# Patient Record
Sex: FEMALE | Race: WHITE | NOT HISPANIC OR LATINO | Employment: OTHER | URBAN - METROPOLITAN AREA
[De-identification: names, ages, dates, MRNs, and addresses within clinical notes are randomized per-mention and may not be internally consistent; named-entity substitution may affect disease eponyms.]

---

## 2017-03-15 ENCOUNTER — OFFICE VISIT (OUTPATIENT)
Dept: FAMILY MEDICINE | Facility: CLINIC | Age: 62
End: 2017-03-15
Payer: COMMERCIAL

## 2017-03-15 VITALS
OXYGEN SATURATION: 98 % | SYSTOLIC BLOOD PRESSURE: 124 MMHG | BODY MASS INDEX: 27.28 KG/M2 | TEMPERATURE: 98 F | WEIGHT: 184.19 LBS | DIASTOLIC BLOOD PRESSURE: 81 MMHG | HEIGHT: 69 IN | HEART RATE: 84 BPM

## 2017-03-15 DIAGNOSIS — E03.9 HYPOTHYROIDISM, UNSPECIFIED TYPE: ICD-10-CM

## 2017-03-15 DIAGNOSIS — Z23 NEED FOR DIPHTHERIA-TETANUS-PERTUSSIS (TDAP) VACCINE, ADULT/ADOLESCENT: Primary | ICD-10-CM

## 2017-03-15 DIAGNOSIS — M54.50 CHRONIC LOW BACK PAIN WITHOUT SCIATICA, UNSPECIFIED BACK PAIN LATERALITY: Chronic | ICD-10-CM

## 2017-03-15 DIAGNOSIS — G43.109 OPHTHALMIC MIGRAINE: ICD-10-CM

## 2017-03-15 DIAGNOSIS — G89.29 CHRONIC LOW BACK PAIN WITHOUT SCIATICA, UNSPECIFIED BACK PAIN LATERALITY: Chronic | ICD-10-CM

## 2017-03-15 DIAGNOSIS — D12.6 TUBULAR ADENOMA OF COLON: ICD-10-CM

## 2017-03-15 DIAGNOSIS — I10 UNSPECIFIED ESSENTIAL HYPERTENSION: ICD-10-CM

## 2017-03-15 DIAGNOSIS — Z23 NEED FOR SHINGLES VACCINE: ICD-10-CM

## 2017-03-15 PROCEDURE — 90736 HZV VACCINE LIVE SUBQ: CPT | Mod: S$GLB,,, | Performed by: INTERNAL MEDICINE

## 2017-03-15 PROCEDURE — 99214 OFFICE O/P EST MOD 30 MIN: CPT | Mod: 25,S$GLB,, | Performed by: INTERNAL MEDICINE

## 2017-03-15 PROCEDURE — 90715 TDAP VACCINE 7 YRS/> IM: CPT | Mod: S$GLB,,, | Performed by: INTERNAL MEDICINE

## 2017-03-15 PROCEDURE — 3074F SYST BP LT 130 MM HG: CPT | Mod: S$GLB,,, | Performed by: INTERNAL MEDICINE

## 2017-03-15 PROCEDURE — 3079F DIAST BP 80-89 MM HG: CPT | Mod: S$GLB,,, | Performed by: INTERNAL MEDICINE

## 2017-03-15 PROCEDURE — 1160F RVW MEDS BY RX/DR IN RCRD: CPT | Mod: S$GLB,,, | Performed by: INTERNAL MEDICINE

## 2017-03-15 PROCEDURE — 90471 IMMUNIZATION ADMIN: CPT | Mod: S$GLB,,, | Performed by: INTERNAL MEDICINE

## 2017-03-15 PROCEDURE — 90472 IMMUNIZATION ADMIN EACH ADD: CPT | Mod: S$GLB,,, | Performed by: INTERNAL MEDICINE

## 2017-03-15 PROCEDURE — 99999 PR PBB SHADOW E&M-EST. PATIENT-LVL III: CPT | Mod: PBBFAC,,, | Performed by: INTERNAL MEDICINE

## 2017-03-15 RX ORDER — NADOLOL 20 MG/1
20 TABLET ORAL DAILY
Qty: 30 TABLET | Refills: 11 | Status: SHIPPED | OUTPATIENT
Start: 2017-03-15 | End: 2017-10-12

## 2017-03-15 RX ORDER — LEVOTHYROXINE SODIUM 75 UG/1
75 TABLET ORAL
Qty: 90 TABLET | Refills: 3 | Status: SHIPPED | OUTPATIENT
Start: 2017-03-15 | End: 2018-03-02 | Stop reason: SDUPTHER

## 2017-03-15 RX ORDER — GABAPENTIN 300 MG/1
300 CAPSULE ORAL 3 TIMES DAILY
Qty: 90 CAPSULE | Refills: 11 | Status: SHIPPED | OUTPATIENT
Start: 2017-03-15 | End: 2017-10-12

## 2017-03-15 RX ORDER — LOSARTAN POTASSIUM 100 MG/1
100 TABLET ORAL DAILY
Qty: 90 TABLET | Refills: 3 | Status: SHIPPED | OUTPATIENT
Start: 2017-03-15 | End: 2017-10-12

## 2017-03-15 NOTE — PROGRESS NOTES
Assessment & Plan  Need for diphtheria-tetanus-pertussis (Tdap) vaccine, adult/adolescent  -     Tdap Vaccine    Chronic low back pain without sciatica, unspecified back pain laterality-discussed gabapentin, she can increase it to 1 tablet a morning, 2 tablets at night.  -     Ambulatory Referral to Physical/Occupational Therapy  -     gabapentin (NEURONTIN) 300 MG capsule; Take 1 capsule (300 mg total) by mouth 3 (three) times daily.  Dispense: 90 capsule; Refill: 11    Ophthalmic migraine-refilled nadolol.  Rare use.  -     nadolol (CORGARD) 20 MG tablet; Take 1 tablet (20 mg total) by mouth once daily.  Dispense: 30 tablet; Refill: 11    Tubular adenoma of colon-due for repeat January 2018    Hypothyroidism, unspecified type-stable on last check, refilled today  -     levothyroxine (SYNTHROID) 75 MCG tablet; Take 1 tablet (75 mcg total) by mouth before breakfast.  Dispense: 90 tablet; Refill: 3    Unspecified essential hypertension-stable, refilled losartan.  -     losartan (COZAAR) 100 MG tablet; Take 1 tablet (100 mg total) by mouth once daily.  Dispense: 90 tablet; Refill: 3    Need for shingles vaccine  -     Zoster Vaccine - Live    Ten-year ASCVD risk score calculated.  Less than 5%.  No indication for statin at this time.      Medications Discontinued During This Encounter   Medication Reason    levothyroxine (SYNTHROID) 75 MCG tablet Reorder    losartan (COZAAR) 100 MG tablet Reorder    nadolol (CORGARD) 20 MG tablet Reorder    gabapentin (NEURONTIN) 300 MG capsule Reorder       Follow-up: No Follow-up on file.PEx 1 year      =================================================================      Chief Complaint   Patient presents with    Immunizations       HPI  Radha is a 61 y.o. female, last appointment with this clinic was Visit date not found.    No LMP recorded. Patient is postmenopausal.    HTN-losartan, nadolol to take as needed for blood pressure but also mainly for migraines.  Does not check  BPs outside office.  chronic back pain; 8/10/2016 MRI L spine: 1. Significant degenerative disc disease at L5-S1 resulting in mild bilateral neuroforaminal narrowing.  Otherwise mild multilevel lumbar spondylosis. 2. Cholelithiasis.  MRI of T spine showed: 1. Mild degenerative changes of the thoracic spine. No spinal canal stenosis or neuroforaminal narrowing. Spinal cord signal and morphology are maintained.2.  Cholelithiasis. 3. Subcentimeter T2 hyperintense lesion at the liver dome incompletely characterized.  Not sure that PT that helpful.  But is interested in restarting.  Amira not that helpful for pain - takes 2 tablets @ night and really just makes her fall asleep (beneficial).  Is open to trying higher dose.  Not interested in injection -  had had back injection and didn't find it helpful.  It actually seems to get worse if she just sits still.  hypothyroid  Migraines - ophthalmic migraine with scotomas.  Some subsequent neck stiffness but no classic headache.  Has not had to use nadolol in quite some time.  5/18/2015 MRI brain neg  5/18/2015 carotid US negative.  gallstones; 7/13/2016 RUQ US: Stable subcentimeter hyperechoic lesion in the superior right hepatic lobe, possibly representing a small hemangioma, unchanged when compared to the previous study from 2003, suggestive of a benign etiology. No new focal hepatic lesions identified. Cholelithiasis. Stable simple right renal cyst.  colon polyp; 1/11/2013 colonoscopy with sigmoid hyperplastic polyp with suggestion of focal early adenomatous change; melanosis coli.  uterine fibroid.  Requesting shingles vaccination today.  Also due for a tetanus shot and she is agreeable for that.    Patient Care Team:  Ab Lai MD (Inactive) as PCP - General (Family Medicine)  Edgard Multani II, MD as Consulting Physician (Endocrinology)  Tatianna Latham MD as Consulting Physician (Physical Medicine and Rehabilitation)  Josh Daily MD as  Obstetrician (Obstetrics)    Patient Active Problem List    Diagnosis Date Noted    Chronic back pain 08/10/2016     8/10/2016 MRI L spine: 1. Significant degenerative disc disease at L5-S1 resulting in mild bilateral neuroforaminal narrowing.  Otherwise mild multilevel lumbar spondylosis. 2. Cholelithiasis.  MRI of T spine showed: 1. Mild degenerative changes of the thoracic spine. No spinal canal stenosis or neuroforaminal narrowing.  Spinal cord signal and morphology are maintained.2.  Cholelithiasis. 3.  Subcentimeter T2 hyperintense lesion at the liver dome incompletely characterized.      Muscle spasm of back 08/10/2016    Lower abdominal pain, unspecified 06/28/2016    Tailbone injury 06/28/2016    Hypothyroidism due to Hashimoto's thyroiditis 06/16/2015    Migraine syndrome 05/19/2015     Symptoms - tightness in the back of the neck and scotomas - really more opthalmic migraine.  5/18/2015 MRI brain neg  5/18/2015 carotid US negative.      Essential hypertension 03/25/2014    Tubular adenoma of colon 03/25/2014 1/11/2013 colonoscopy with sigmoid hyperplastic polyp with suggestion of focal early adenomatous change; melanosis coli.      Alopecia areata 03/25/2014    Asymptomatic cholelithiasis 10/13/2012    Lichen planus 10/13/2012    Scoliosis 10/13/2012       PAST MEDICAL HISTORY:  Past Medical History:   Diagnosis Date    Asymptomatic cholelithiasis     Hypothyroidism     Lichen planus     Scoliosis     Thyroid disease     Unspecified essential hypertension 3/25/2014       PAST SURGICAL HISTORY:  Past Surgical History:   Procedure Laterality Date    COLONOSCOPY  2006    normal.    NOSE SURGERY      TONSILLECTOMY      TUBAL LIGATION       Family History   Problem Relation Age of Onset    Adopted: Yes    No Known Problems Daughter     No Known Problems Son     No Known Problems Son     Skin cancer Neg Hx        SOCIAL HISTORY:  Social History     Social History    Marital  status:      Spouse name: N/A    Number of children: N/A    Years of education: N/A     Occupational History    Not on file.     Social History Main Topics    Smoking status: Former Smoker     Quit date: 10/12/2004    Smokeless tobacco: Never Used    Alcohol use Yes      Comment: occassionally    Drug use: Not on file    Sexual activity: Yes     Other Topics Concern    Not on file     Social History Narrative       ALLERGIES AND MEDICATIONS: updated and reviewed.  Review of patient's allergies indicates:   Allergen Reactions    Iodinated contrast media - iv dye      Current Outpatient Prescriptions   Medication Sig Dispense Refill    gabapentin (NEURONTIN) 300 MG capsule Take 1 capsule (300 mg total) by mouth 3 (three) times daily. 90 capsule 11    levothyroxine (SYNTHROID) 75 MCG tablet Take 1 tablet (75 mcg total) by mouth before breakfast. 90 tablet 3    aspirin (ECOTRIN) 81 MG EC tablet Take 1 tablet (81 mg total) by mouth once daily. 100 tablet prn    fluocinonide (LIDEX) 0.05 % external solution Apply topically once daily. To scalp 60 mL 3    ketoconazole (NIZORAL) 2 % shampoo Apply topically every other day. 120 mL 5    losartan (COZAAR) 100 MG tablet Take 1 tablet (100 mg total) by mouth once daily. 90 tablet 3    nadolol (CORGARD) 20 MG tablet Take 1 tablet (20 mg total) by mouth once daily. 30 tablet 11    triamcinolone acetonide 0.1% (KENALOG) 0.1 % cream Apply topically 2 (two) times daily. 30 g 3    valacyclovir (VALTREX) 500 MG tablet Take 1 tablet (500 mg total) by mouth 2 (two) times daily. 30 tablet 3     No current facility-administered medications for this visit.        Review of Systems   Constitutional: Negative for fever, malaise/fatigue and weight loss.   HENT: Negative for congestion.    Eyes: Negative for blurred vision and pain.   Respiratory: Negative for shortness of breath and wheezing.    Cardiovascular: Negative for chest pain, palpitations and leg swelling.  "  Gastrointestinal: Negative for abdominal pain, blood in stool, constipation, diarrhea and heartburn.   Genitourinary: Negative for dysuria, hematuria and urgency.   Musculoskeletal: Positive for back pain. Negative for joint pain.   Neurological: Negative for tingling, focal weakness, weakness and headaches.   Psychiatric/Behavioral: Negative for depression. The patient is not nervous/anxious.        Physical Exam   Vitals:    03/15/17 1335   BP: 124/81   Pulse: 84   Temp: 97.6 °F (36.4 °C)   TempSrc: Oral   SpO2: 98%   Weight: 83.6 kg (184 lb 3.1 oz)   Height: 5' 9" (1.753 m)    Body mass index is 27.2 kg/(m^2).  Weight: 83.6 kg (184 lb 3.1 oz)   Height: 5' 9" (175.3 cm)     Physical Exam   Constitutional: She is oriented to person, place, and time. She appears well-developed and well-nourished. No distress.   Eyes: EOM are normal.   Neck: No thyromegaly present.   Cardiovascular: Normal rate, regular rhythm and normal heart sounds.    No murmur heard.  Pulmonary/Chest: Effort normal and breath sounds normal.   Musculoskeletal: Normal range of motion.   Lymphadenopathy:     She has no cervical adenopathy.   Neurological: She is alert and oriented to person, place, and time. Coordination normal.   1+ DTR patella bilaterally   Skin: Skin is warm and dry.   Psychiatric: She has a normal mood and affect. Her behavior is normal. Thought content normal.     "

## 2017-03-27 ENCOUNTER — CLINICAL SUPPORT (OUTPATIENT)
Dept: REHABILITATION | Facility: HOSPITAL | Age: 62
End: 2017-03-27
Attending: INTERNAL MEDICINE
Payer: COMMERCIAL

## 2017-03-27 DIAGNOSIS — R29.818 POOR MOTOR CONTROL OF TRUNK: ICD-10-CM

## 2017-03-27 DIAGNOSIS — M54.50 CHRONIC MIDLINE LOW BACK PAIN WITHOUT SCIATICA: Primary | Chronic | ICD-10-CM

## 2017-03-27 DIAGNOSIS — G89.29 CHRONIC MIDLINE LOW BACK PAIN WITHOUT SCIATICA: Primary | Chronic | ICD-10-CM

## 2017-03-27 DIAGNOSIS — M62.89 MUSCLE TIGHTNESS: ICD-10-CM

## 2017-03-27 PROCEDURE — 97161 PT EVAL LOW COMPLEX 20 MIN: CPT | Mod: PN

## 2017-03-27 NOTE — PROGRESS NOTES
Physical Therapy Initial Evaluation     Name: Radha Fraga  [facility] Number: [id number]    Diagnosis:   Encounter Diagnoses   Name Primary?    Chronic midline low back pain without sciatica Yes    Poor motor control of trunk     Muscle tightness      Physician: Chicho Meléndez MD  Treatment Orders: PT Eval and Treat  Past Medical History:   Diagnosis Date    Asymptomatic cholelithiasis     Hypothyroidism     Lichen planus     Scoliosis     Thyroid disease     Unspecified essential hypertension 3/25/2014     Current Outpatient Prescriptions   Medication Sig    aspirin (ECOTRIN) 81 MG EC tablet Take 1 tablet (81 mg total) by mouth once daily.    fluocinonide (LIDEX) 0.05 % external solution Apply topically once daily. To scalp    gabapentin (NEURONTIN) 300 MG capsule Take 1 capsule (300 mg total) by mouth 3 (three) times daily.    ketoconazole (NIZORAL) 2 % shampoo Apply topically every other day.    levothyroxine (SYNTHROID) 75 MCG tablet Take 1 tablet (75 mcg total) by mouth before breakfast.    losartan (COZAAR) 100 MG tablet Take 1 tablet (100 mg total) by mouth once daily.    nadolol (CORGARD) 20 MG tablet Take 1 tablet (20 mg total) by mouth once daily.    triamcinolone acetonide 0.1% (KENALOG) 0.1 % cream Apply topically 2 (two) times daily.    valacyclovir (VALTREX) 500 MG tablet Take 1 tablet (500 mg total) by mouth 2 (two) times daily.     No current facility-administered medications for this visit.      Review of patient's allergies indicates:   Allergen Reactions    Iodinated contrast media - iv dye        Subjective     Patient states:  Pt with chief complaint of midline lower back. No pain going down the leg. Pt denies numbness/tingling. Pt denies surgical procedures or injections to low back. Pt denies strength asymmetries or weakness. Most deficits are in morning, by afternoon she is feeling a lot better. She was previously  "attending outpatient PT at this clinic with Alyse Wilburn, however she discontinued following vacation trip out of the country.She reports poor compliance with HEP with previous back exercises. Pt reports with no night sweats, fevers, chills, or unexplained weight loss. Previously was stomach sleeper, but pain in low back and neck caused her to switch to side sleeper. Pain is mid-low back. Was previously experiencing pain in abdominal. Pt states she has cushion cut-out for tail-bone which helped. 50% at low back when stopping PT; now she believes she is 15-20%.    Diagnostic imagin16 "  Significant degenerative disc disease at L5-S1 resulting in mild bilateral neuroforaminal narrowing.  Otherwise mild multilevel lumbar spondylosis. "    Pain Scale: Radha rates pain on a scale of 0-10 to be 7 at worst; 3 currently; 1 at best .  Onset: gradual  Radicular symptoms:  None  Aggravating factors:   Pain in AM,   Easing factors:  Increased activity, walking  Prior Therapy: PT at this clinic with last visit on   Home Environment (Steps/Adaptations): 2nd story house, bedroom downstairs, rarely goes upstairs  Functional Deficits Leading to Referral: Lifting grand-son  Prior functional status: Independent  Bowel/Bladder Change: No  DME owned/used: None  Occupation:  Retired                       Pts goals:  Waking up with no back pain, decreased symptoms,     Objective     Posture Alignment: Scoliosis L illiac crest higher, Right scapula lower    LUMBAR SPINE AROM:   Flexion: 80/55 - 25   Extension: 20/10 - 10   Left Sidebend: 20   Right Sidebend: 25   Left Rotation: WNL   Right Rotation: WNL     SEGMENTAL MOBILITY: L1-5 Joint Hypomobility    LOWER EXTREMITY STRENGTH:   Left Right   Quadriceps 5/5 5/5   Hamstrings 4+/5 4+/5     Iliopsoas 5/5 p! On R side lumbar 5/5   PGM 5/5 5/5   Hip IR 5/5 4/5   Hip ER 5/5 5/5   Hip Ext 4/5 4/5     Dermatomes: Sensation: Light Touch: Intact  Saddle Sensation: " "Intact  Myotomes: Intact  Flexibility: 90/90 HS Test: 30 deg Bilateral    Special Tests:   Left Right   Slump Negative Negative   SLR Negative Negative   Crossed SLR Negative Negative   Sacral Thrust Positive ante pelvic pain Positive ant pelvic pain   AMINAH Positive lumbar p!, motion restriction Positive motion restriction,     GAIT: Radha ambulates with no assistive device with independently.     GAIT DEVIATIONS: Radha displays R lateral trunk lean, forward head, decreased stride length,   Pt/family was provided educational information, including: role of PT, goals for PT, scheduling - pt verbalized understanding. Discussed insurance limitations with pt.     TREATMENT     Time In: 900  Time Out: 1000    PT Evaluation Completed? Yes  Discussed Plan of Care with patient: Yes    Radha received 10 minutes of therapeutic exercise & instruction including:    Hamstring Str c/ strap 2x30"  LTR c/ ball contra UE reach 2x10  DKTC c/ ball BUE reach 2x10  Seated Trunk Flexion Str c/ ball 2x10    Radha received 0 minutes of manual therapy including:    Written Home Exercises Provided: Yes - HS Str, LTR, DKTC, Trunk Flex  Radha demo good understanding of the education provided. Patient demo good return demo of skill of exercises.    Assessment     Patient presents to Physical Therapy Evaluation with diagnosis of Low Back Pain, with signs and symptoms including: increased pain, decreased strength, decreased ROM, muscle length deficits, gait abnormality, postural dysfunction, and decreased tolerance to functional activities. Pt with isolated low back pain, with no symptoms radiating down either LE, no paresthesia in either LE. Pt with postural dysfunction related to scoliosis with R shoulder lower, contributing to gait dysfunction, as well as likely soft tissue restriction including tightness and tenderness in R Quadratus Lumborum and Latissimus Dorsi musculature. Pt with moderate soft tissue restriction in B LE, with tight " hamstring, no neural tension provocation with special tests. Pt with prior coccyx pathology possibly contributing to positive symptom provocation with SI/hip/sacral special tests. Pt with good motivation to perform physical activity and responds well to cueing.    Pt prognosis is Good.  Pt will benefit from skilled outpatient physical therapy to address the above stated deficits, provide pt/family education and to maximize pt's level of independence.     Medical necessity is demonstrated by the following IMPAIRMENTS/PROBLEMS:  weakness, impaired endurance, impaired functional mobility, gait instability, decreased coordination, decreased lower extremity function, decreased safety awareness, pain, abnormal tone, decreased ROM, impaired coordination, impaired joint extensibility and impaired muscle length      History  Co-morbidities and personal factors that may impact the plan of care Examination  Body Structures and Functions, activity limitations and participation restrictions that may impact the plan of care Clinical Presentation   Decision Making/ Complexity Score   Co-morbidities:                 Personal Factors:    Body Regions: thoracolumbar spine, trunk/core, B LE, B UE    Body Systems: musculoskeletal (ROM, strength, endurance, flexibility, gait); neuromuscular (balance, motor control, posture)          Activity limitations: forward bending, squatting, walking      Participation Restrictions: heavy household chores, playing with grandchildren, travel         Stable, uncomplicated   Low Complexity    FOTO Lumbar Survey  Score: 43% Limitation         Pt's spiritual, cultural and educational needs considered and pt agreeable to plan of care and goals as stated below:     Short Term GOALS: 4 weeks. Pt agrees with goals set.  1. Patient demonstrates independence with HEP.   2. Patient demonstrates independence with Postural Awareness.   3.  Patient demonstrates increased strength BLE's by 1/2 muscle grade or  greater on MMT to improve tolerance to functional activities pain free.   4. Patient will report pain of 4/10 at worst, on 0-10 pain scale, with all activity  5. Patient demonstrates improved symptoms and ability to perform forward bending, minimal symptom provocation, appropriate movement pattern to pick object off floor    Long Term GOALS: 8 weeks. Pt agrees with goals set.  1. Patient demonstrates increased thoracolumbar ROM to WFL to improve tolerance to functional activities pain free.   2. Patient demonstrates increased strength BLE's to 5/5 to improve tolerance to functional activities pain free.   3. Patient demonstrates improved overall function per FOTO Lumbar Survey to 40% Limitation or less.   4. Patient will report pain of 2/10 at worst, on 0-10 pain scale, with all activity  5. Patient demonstrates improved function and ability to perform heavy household activities without significant symptom provocation    Functional Limitations Reports - G Codes  Category: Mobility  Tool: FOTO Lumbar Survey  Score: 43% Limitation   Modifier  Impairment Limitation Restriction    CH  0 % impaired, limited or restricted    CI  @ least 1% but less than 20% impaired, limited or restricted    CJ  @ least 20%<40% impaired, limited or restricted    CK  @ least 40%<60% impaired, limited or restricted    CL  @ least 60% <80% impaired, limited or restricted    CM  @ least 80%<100% impaired limited or restricted    CN  100% impaired, limited or restricted     Current/: CK = 40-60% Limitation   Goal/ : CJ = 20-40% Limitation    PLAN     Certification Period: 3/27/17 - 5/27/17    Outpatient physical therapy 1-2 times weekly to include: pt ed, hep, therapeutic exercises, neuromuscular re-education/ balance exercises, joint mobilizations, aquatic therapy and modalities prn. Cont PT for  6-8 weeks. Pt may be seen by PTA as part of the rehabilitation team.     Therapist: Jesus Eduardo, PT  3/27/2017    I have seen the  patient, reviewed the therapist's plan of care, and I agree with the plan of care.      I certify the need for these services furnished under this plan of treatment and while under my care.     ___________________ ________ Physician/Referring Practitioner            ___________________________ Date of Signature

## 2017-03-27 NOTE — PLAN OF CARE
"                                                    Physical Therapy Initial Evaluation     Name: Radha Fraga  Clinic Number: [id number]    Diagnosis:   Encounter Diagnoses   Name Primary?    Chronic midline low back pain without sciatica Yes    Poor motor control of trunk     Muscle tightness      Physician: Chicho Meléndez MD  Treatment Orders: PT Eval and Treat    Subjective     Patient states:  Pt with chief complaint of midline lower back. No pain going down the leg. Pt denies numbness/tingling. Pt denies surgical procedures or injections to low back. Pt denies strength asymmetries or weakness. Most deficits are in morning, by afternoon she is feeling a lot better. She was previously attending outpatient PT at this clinic with Alyse Wilburn, however she discontinued following vacation trip out of the country.She reports poor compliance with HEP with previous back exercises. Pt reports with no night sweats, fevers, chills, or unexplained weight loss. Previously was stomach sleeper, but pain in low back and neck caused her to switch to side sleeper. Pain is mid-low back. Was previously experiencing pain in abdominal. Pt states she has cushion cut-out for tail-bone which helped. 50% at low back when stopping PT; now she believes she is 15-20%.    Diagnostic imagin16 "  Significant degenerative disc disease at L5-S1 resulting in mild bilateral neuroforaminal narrowing.  Otherwise mild multilevel lumbar spondylosis. "    Pain Scale: Radha rates pain on a scale of 0-10 to be 7 at worst; 3 currently; 1 at best .  Onset: gradual  Radicular symptoms:  None  Aggravating factors:   Pain in AM,   Easing factors:  Increased activity, walking  Prior Therapy: PT at this clinic with last visit on   Home Environment (Steps/Adaptations): 2nd story house, bedroom downstairs, rarely goes upstairs  Functional Deficits Leading to Referral: Lifting grand-son  Prior functional status: " "Independent  Bowel/Bladder Change: No  DME owned/used: None  Occupation:  Retired                       Pts goals:  Waking up with no back pain, decreased symptoms,     Objective     Posture Alignment: Scoliosis L illiac crest higher, Right scapula lower    LUMBAR SPINE AROM:   Flexion: 80/55 - 25   Extension: 20/10 - 10   Left Sidebend: 20   Right Sidebend: 25   Left Rotation: WNL   Right Rotation: WNL     SEGMENTAL MOBILITY: L1-5 Joint Hypomobility    LOWER EXTREMITY STRENGTH:   Left Right   Quadriceps 5/5 5/5   Hamstrings 4+/5 4+/5     Iliopsoas 5/5 p! On R side lumbar 5/5   PGM 5/5 5/5   Hip IR 5/5 4/5   Hip ER 5/5 5/5   Hip Ext 4/5 4/5     Dermatomes: Sensation: Light Touch: Intact  Saddle Sensation: Intact  Myotomes: Intact  Flexibility: 90/90 HS Test: 30 deg Bilateral    Special Tests:   Left Right   Slump Negative Negative   SLR Negative Negative   Crossed SLR Negative Negative   Sacral Thrust Positive ante pelvic pain Positive ant pelvic pain   AMINAH Positive lumbar p!, motion restriction Positive motion restriction,     GAIT: Radha ambulates with no assistive device with independently.     GAIT DEVIATIONS: Radha displays R lateral trunk lean, forward head, decreased stride length,   Pt/family was provided educational information, including: role of PT, goals for PT, scheduling - pt verbalized understanding. Discussed insurance limitations with pt.     TREATMENT     Time In: 900  Time Out: 1000    PT Evaluation Completed? Yes  Discussed Plan of Care with patient: Yes    Radha received 10 minutes of therapeutic exercise & instruction including:    Hamstring Str c/ strap 2x30"  LTR c/ ball contra UE reach 2x10  DKTC c/ ball BUE reach 2x10  Seated Trunk Flexion Str c/ ball 2x10    Radha received 0 minutes of manual therapy including:    Written Home Exercises Provided: Yes - HS Str, LTR, DKTC, Trunk Flex  Radha demo good understanding of the education provided. Patient demo good return demo of skill of " exercises.    Assessment     Patient presents to Physical Therapy Evaluation with diagnosis of Low Back Pain, with signs and symptoms including: increased pain, decreased strength, decreased ROM, muscle length deficits, gait abnormality, postural dysfunction, and decreased tolerance to functional activities. Pt with isolated low back pain, with no symptoms radiating down either LE, no paresthesia in either LE. Pt with postural dysfunction related to scoliosis with R shoulder lower, contributing to gait dysfunction, as well as likely soft tissue restriction including tightness and tenderness in R Quadratus Lumborum and Latissimus Dorsi musculature. Pt with moderate soft tissue restriction in B LE, with tight hamstring, no neural tension provocation with special tests. Pt with prior coccyx pathology possibly contributing to positive symptom provocation with SI/hip/sacral special tests. Pt with good motivation to perform physical activity and responds well to cueing.    Pt prognosis is Good.  Pt will benefit from skilled outpatient physical therapy to address the above stated deficits, provide pt/family education and to maximize pt's level of independence.     Medical necessity is demonstrated by the following IMPAIRMENTS/PROBLEMS:  weakness, impaired endurance, impaired functional mobility, gait instability, decreased coordination, decreased lower extremity function, decreased safety awareness, pain, abnormal tone, decreased ROM, impaired coordination, impaired joint extensibility and impaired muscle length      History  Co-morbidities and personal factors that may impact the plan of care Examination  Body Structures and Functions, activity limitations and participation restrictions that may impact the plan of care Clinical Presentation   Decision Making/ Complexity Score   Co-morbidities:                 Personal Factors:    Body Regions: thoracolumbar spine, trunk/core, B LE, B UE    Body Systems: musculoskeletal  (ROM, strength, endurance, flexibility, gait); neuromuscular (balance, motor control, posture)          Activity limitations: forward bending, squatting, walking      Participation Restrictions: heavy household chores, playing with grandchildren, travel         Stable, uncomplicated   Low Complexity    FOTO Lumbar Survey  Score: 43% Limitation         Pt's spiritual, cultural and educational needs considered and pt agreeable to plan of care and goals as stated below:     Short Term GOALS: 4 weeks. Pt agrees with goals set.  1. Patient demonstrates independence with HEP.   2. Patient demonstrates independence with Postural Awareness.   3.  Patient demonstrates increased strength BLE's by 1/2 muscle grade or greater on MMT to improve tolerance to functional activities pain free.   4. Patient will report pain of 4/10 at worst, on 0-10 pain scale, with all activity  5. Patient demonstrates improved symptoms and ability to perform forward bending, minimal symptom provocation, appropriate movement pattern to pick object off floor    Long Term GOALS: 8 weeks. Pt agrees with goals set.  1. Patient demonstrates increased thoracolumbar ROM to WFL to improve tolerance to functional activities pain free.   2. Patient demonstrates increased strength BLE's to 5/5 to improve tolerance to functional activities pain free.   3. Patient demonstrates improved overall function per FOTO Lumbar Survey to 40% Limitation or less.   4. Patient will report pain of 2/10 at worst, on 0-10 pain scale, with all activity  5. Patient demonstrates improved function and ability to perform heavy household activities without significant symptom provocation    Functional Limitations Reports - G Codes  Category: Mobility  Tool: FOTO Lumbar Survey  Score: 43% Limitation   Modifier  Impairment Limitation Restriction    CH  0 % impaired, limited or restricted    CI  @ least 1% but less than 20% impaired, limited or restricted    CJ  @ least 20%<40%  impaired, limited or restricted    CK  @ least 40%<60% impaired, limited or restricted    CL  @ least 60% <80% impaired, limited or restricted    CM  @ least 80%<100% impaired limited or restricted    CN  100% impaired, limited or restricted     Current/: CK = 40-60% Limitation   Goal/ : CJ = 20-40% Limitation    PLAN     Certification Period: 3/27/17 - 5/27/17    Outpatient physical therapy 1-2 times weekly to include: pt ed, hep, therapeutic exercises, neuromuscular re-education/ balance exercises, joint mobilizations, aquatic therapy and modalities prn. Cont PT for  6-8 weeks. Pt may be seen by PTA as part of the rehabilitation team.     Therapist: Jesus Eduardo, PT  3/27/2017    I have seen the patient, reviewed the therapist's plan of care, and I agree with the plan of care.      I certify the need for these services furnished under this plan of treatment and while under my care.     ___________________ ________ Physician/Referring Practitioner            ___________________________ Date of Signature

## 2017-03-31 ENCOUNTER — CLINICAL SUPPORT (OUTPATIENT)
Dept: REHABILITATION | Facility: HOSPITAL | Age: 62
End: 2017-03-31
Attending: INTERNAL MEDICINE
Payer: COMMERCIAL

## 2017-03-31 DIAGNOSIS — G89.29 CHRONIC LOW BACK PAIN WITHOUT SCIATICA, UNSPECIFIED BACK PAIN LATERALITY: ICD-10-CM

## 2017-03-31 DIAGNOSIS — M62.89 MUSCLE TIGHTNESS: ICD-10-CM

## 2017-03-31 DIAGNOSIS — G89.29 CHRONIC MIDLINE LOW BACK PAIN WITHOUT SCIATICA: Primary | ICD-10-CM

## 2017-03-31 DIAGNOSIS — M54.50 CHRONIC LOW BACK PAIN WITHOUT SCIATICA, UNSPECIFIED BACK PAIN LATERALITY: ICD-10-CM

## 2017-03-31 DIAGNOSIS — M62.830 MUSCLE SPASM OF BACK: ICD-10-CM

## 2017-03-31 DIAGNOSIS — R29.818 POOR MOTOR CONTROL OF TRUNK: ICD-10-CM

## 2017-03-31 DIAGNOSIS — M54.50 CHRONIC MIDLINE LOW BACK PAIN WITHOUT SCIATICA: Primary | ICD-10-CM

## 2017-03-31 PROCEDURE — 97110 THERAPEUTIC EXERCISES: CPT | Mod: PN

## 2017-03-31 PROCEDURE — 97140 MANUAL THERAPY 1/> REGIONS: CPT | Mod: PN

## 2017-03-31 NOTE — PROGRESS NOTES
"                                                    Physical Therapy Daily Note     Name: Radha Fraga  Clinic Number: [id number]  Diagnosis:   Encounter Diagnoses   Name Primary?    Chronic midline low back pain without sciatica Yes    Poor motor control of trunk     Muscle tightness     Chronic low back pain without sciatica, unspecified back pain laterality     Muscle spasm of back      Physician: Chicho Meléndez MD    Time In: 0900  Time Out: 1005  Total Treatment Time:  65' 9 1:1 with PTA for 45' of treatment session)     Visit #: 2 of 20  Certification Period: 3/27/17 - 5/27/17    Subjective     Pt reports that she is doing good today.  Patient reports that she did not have any increased pain after last treatment session.    Pain Scale: Radha rates pain on a scale of 0-10 to be 3 currently.    Objective     Radha received individual therapeutic exercises to develop strength, endurance, ROM, flexibility, posture and core stabilization for 40 minutes including:    Hamstring Str c/ strap 2x30"  LTR c/ ball contra UE reach 2x10  DKTC c/ ball BUE reach 2x10  Supine adductor stretch 3 x 30''  TrA recruitment 2' x 5'' hold   Prone hip extension x 20 ea  Seated Trunk Flexion Str c/ ball 2x10  Standing QL stretch against wall 2 x 20'' ea side    Shuttle 3 x 10 1 band   Piriformis stretch on shuttle 3 x 30''       Radha received the following manual therapy techniques: STM lumbar paraspinals, deep cross arm myofascial release lumbar paraspinals, TPR to Sacral trigger points x 15 minutes       MHP concurrently with exercises x 10' at start of session  Ice pack x 10' in prone post treatment session     Written Home Exercises Reviewed.   Pt demo good understanding of the education provided. Radha demonstrated good return demonstration of activities.     Education provided re:  Radha verbalized good understanding of education provided.   No spiritual or educational barriers to learning provided    Assessment "     Patient tolerated treatment session well.  Patient with good response to exercises without provocation of pain.  Patient demonstrated increased tissue restrictions throughout the bilateral lumbar paraspinals, superior gluteal musculature and trigger points palpated along the sacrum.  Patient with good response to manual care with decreased pain reported post treatment session.   This is a 61 y.o. female referred to outpatient physical therapy and presents with a medical diagnosis of chronic midline low back pain without sciatica and demonstrates limitations as described in the problem list. Pt prognosis is Good. Pt will continue to benefit from skilled outpatient physical therapy to address the deficits listed in the problem list, provide pt/family education and to maximize pt's level of independence in the home and community environment.     Goals as follows:  Short Term GOALS: 4 weeks. Pt agrees with goals set.  1. Patient demonstrates independence with HEP.   2. Patient demonstrates independence with Postural Awareness.   3. Patient demonstrates increased strength BLE's by 1/2 muscle grade or greater on MMT to improve tolerance to functional activities pain free.   4. Patient will report pain of 4/10 at worst, on 0-10 pain scale, with all activity  5. Patient demonstrates improved symptoms and ability to perform forward bending, minimal symptom provocation, appropriate movement pattern to pick object off floor     Plan     Continue with established Plan of Care towards PT goals.    Therapist: Angelica Slater, MARSHALL  3/31/2017

## 2017-04-05 ENCOUNTER — CLINICAL SUPPORT (OUTPATIENT)
Dept: REHABILITATION | Facility: HOSPITAL | Age: 62
End: 2017-04-05
Attending: INTERNAL MEDICINE
Payer: COMMERCIAL

## 2017-04-05 DIAGNOSIS — R29.818 POOR MOTOR CONTROL OF TRUNK: ICD-10-CM

## 2017-04-05 DIAGNOSIS — G89.29 CHRONIC MIDLINE LOW BACK PAIN WITHOUT SCIATICA: Primary | ICD-10-CM

## 2017-04-05 DIAGNOSIS — M62.89 MUSCLE TIGHTNESS: ICD-10-CM

## 2017-04-05 DIAGNOSIS — M54.50 CHRONIC MIDLINE LOW BACK PAIN WITHOUT SCIATICA: Primary | ICD-10-CM

## 2017-04-05 PROCEDURE — 97110 THERAPEUTIC EXERCISES: CPT | Mod: PN

## 2017-04-05 NOTE — PROGRESS NOTES
"                                                    Physical Therapy Daily Note     Name: Radha Fraga  Clinic Number: [id number]  Diagnosis:   Encounter Diagnoses   Name Primary?    Chronic midline low back pain without sciatica Yes    Poor motor control of trunk     Muscle tightness      Physician: Chicho Meléndez MD    Time In: 0930  Time Out: 1030  Total Treatment Time:  70' (1:1 with PT for 30' of treatment session; 10 min ice)     Visit #: 3 of 20  MATHUR: 12/31/17  Certification Period: 3/27/17 - 5/27/17    Subjective     Pt reports She is feeling improved overall. She performed lots of gardening yesterday, has not been performing HEP consistently.  Pain Scale: Radha rates pain on a scale of 0-10 to be 4 currently.    Objective     Radha received individual therapeutic exercises to develop strength, endurance, ROM, flexibility, posture and core stabilization for 40 minutes including:    Scifit Nustep 6 min  Calf Stretch 3x30"  Hamstring Str c/ strap 2x30"  Piriformis Str 3x30"  LTR c/ ball contra UE reach 2x10  DKTC c/ ball BUE reach 2x10  Supine adductor stretch 3 x 30''  TrA recruitment 2x10 x 5'' hold   TrA with SLR 15x ea LE  Prone hip extension x 20 ea  Seated Trunk Flexion Str c/ ball 3 min 3-way  Standing QL stretch against wall 2 x 20'' ea side    Shuttle 3 x 10 2-band   Piriformis stretch on shuttle 2 x 30''    Hip Add Matrix 30x 25#  Hip Abd Matrix 30x 40#    Radha received the following manual therapy techniques: STM lumbar paraspinals, deep cross arm myofascial release lumbar paraspinals, TPR to Sacral trigger points x 15 minutes       MHP concurrently with exercises x 10' at start of session  Ice pack x 10' in prone post treatment session     Written Home Exercises Reviewed.   Pt demo good understanding of the education provided. Radha demonstrated good return demonstration of activities.     Education provided re:  Radha verbalized good understanding of education provided.   No spiritual " or educational barriers to learning provided    Assessment     Patient tolerated treatment session well. Pt with no symptom provocation throughout treatment session. Pt with cueing provided to synchronize breathing with core muscle activation in conjunction with dynamic LE strength training. Pt with good relief reported following thoracolumbar active rotation range of motion. Pt with greater tenderness, hypertonic trigger points of R sacrum greater than L.    This is a 61 y.o. female referred to outpatient physical therapy and presents with a medical diagnosis of chronic midline low back pain without sciatica and demonstrates limitations as described in the problem list. Pt prognosis is Good. Pt will continue to benefit from skilled outpatient physical therapy to address the deficits listed in the problem list, provide pt/family education and to maximize pt's level of independence in the home and community environment.     Goals as follows:  Short Term GOALS: 4 weeks. Pt agrees with goals set.  1. Patient demonstrates independence with HEP.   2. Patient demonstrates independence with Postural Awareness.   3. Patient demonstrates increased strength BLE's by 1/2 muscle grade or greater on MMT to improve tolerance to functional activities pain free.   4. Patient will report pain of 4/10 at worst, on 0-10 pain scale, with all activity  5. Patient demonstrates improved symptoms and ability to perform forward bending, minimal symptom provocation, appropriate movement pattern to pick object off floor     Plan     Continue with established Plan of Care towards PT goals.    Therapist: Jesus Eduardo, PT  4/5/2017

## 2017-04-07 ENCOUNTER — CLINICAL SUPPORT (OUTPATIENT)
Dept: REHABILITATION | Facility: HOSPITAL | Age: 62
End: 2017-04-07
Attending: INTERNAL MEDICINE
Payer: COMMERCIAL

## 2017-04-07 DIAGNOSIS — G89.29 CHRONIC MIDLINE LOW BACK PAIN WITHOUT SCIATICA: Primary | ICD-10-CM

## 2017-04-07 DIAGNOSIS — M62.89 MUSCLE TIGHTNESS: ICD-10-CM

## 2017-04-07 DIAGNOSIS — M62.830 MUSCLE SPASM OF BACK: ICD-10-CM

## 2017-04-07 DIAGNOSIS — R29.818 POOR MOTOR CONTROL OF TRUNK: ICD-10-CM

## 2017-04-07 DIAGNOSIS — M54.50 CHRONIC MIDLINE LOW BACK PAIN WITHOUT SCIATICA: Primary | ICD-10-CM

## 2017-04-07 PROCEDURE — 97110 THERAPEUTIC EXERCISES: CPT | Mod: PN

## 2017-04-07 NOTE — PROGRESS NOTES
"                                                    Physical Therapy Daily Note     Name: Radha Fraga  Clinic Number: [id number]  Diagnosis:   Encounter Diagnoses   Name Primary?    Chronic midline low back pain without sciatica Yes    Poor motor control of trunk     Muscle tightness     Muscle spasm of back      Physician: Chicho Meléndez MD    Time In: 800  Time Out: 900  Total Treatment Time:  70' (1:1 with PT for 30' of treatment session; 10 min ice)     Visit #: 4 of 20  MATHUR: 12/31/17  Certification Period: 3/27/17 - 5/27/17    Subjective     Pt reports She is feeling overall improved, has been performing HEP. States she "barely feels" discomfort.  Pain Scale: Radha rates pain on a scale of 0-10 to be 1 currently.    Objective     Radha received individual therapeutic exercises to develop strength, endurance, ROM, flexibility, posture and core stabilization for 50 minutes including:    Scifit Nustep 6 min  Calf Stretch 3x30"  Hamstring Str c/ strap 2x30"  Piriformis Str 3x30"  LTR c/ ball contra UE reach 2x10  DKTC c/ ball BUE reach 2x10  Supine adductor stretch 3 x 30''  TrA recruitment 2x10 x 5'' hold   TrA with SLR 15x ea LE  Prone hip extension x 20 ea  Seated Trunk Flexion Str c/ ball 3 min 3-way  Standing QL stretch against wall 2 x 20'' ea side    Shuttle 3 x 10 2-band   Piriformis stretch on shuttle 2 x 30''    Hip Add Matrix 30x 35#  Hip Abd Matrix 30x 45#    Radha received the following manual therapy techniques: STM lumbar paraspinals, deep cross arm myofascial release lumbar paraspinals, TPR to Sacral trigger points x 10 minutes       MHP concurrently with exercises x 10' at start of session  Ice pack x 10' in prone post treatment session     Written Home Exercises Reviewed.   Pt demo good understanding of the education provided. Radha demonstrated good return demonstration of activities.     Education provided re:  Radha verbalized good understanding of education provided.   No spiritual " or educational barriers to learning provided    Assessment     Patient tolerated treatment session well without symptom provocation throughout. Pt demonstrates improving LE strength and endurance to increase resistance on machine weight-training this session. Pt with progressively improving ability to perform and maintain consistent core activation with appropriate breathing technique. Pt with mild trigger points along R sacrum/SIJ; relieved following manual therapy    This is a 61 y.o. female referred to outpatient physical therapy and presents with a medical diagnosis of chronic midline low back pain without sciatica and demonstrates limitations as described in the problem list. Pt prognosis is Good. Pt will continue to benefit from skilled outpatient physical therapy to address the deficits listed in the problem list, provide pt/family education and to maximize pt's level of independence in the home and community environment.     Goals as follows:  Short Term GOALS: 4 weeks. Pt agrees with goals set.  1. Patient demonstrates independence with HEP.   2. Patient demonstrates independence with Postural Awareness.   3. Patient demonstrates increased strength BLE's by 1/2 muscle grade or greater on MMT to improve tolerance to functional activities pain free.   4. Patient will report pain of 4/10 at worst, on 0-10 pain scale, with all activity  5. Patient demonstrates improved symptoms and ability to perform forward bending, minimal symptom provocation, appropriate movement pattern to pick object off floor     Plan     Continue with established Plan of Care towards PT goals.    Therapist: Jesus Eduardo, PT  4/7/2017

## 2017-04-10 ENCOUNTER — CLINICAL SUPPORT (OUTPATIENT)
Dept: REHABILITATION | Facility: HOSPITAL | Age: 62
End: 2017-04-10
Attending: INTERNAL MEDICINE
Payer: COMMERCIAL

## 2017-04-10 DIAGNOSIS — M62.89 MUSCLE TIGHTNESS: ICD-10-CM

## 2017-04-10 DIAGNOSIS — M54.50 CHRONIC MIDLINE LOW BACK PAIN WITHOUT SCIATICA: Primary | ICD-10-CM

## 2017-04-10 DIAGNOSIS — R29.818 POOR MOTOR CONTROL OF TRUNK: ICD-10-CM

## 2017-04-10 DIAGNOSIS — G89.29 CHRONIC MIDLINE LOW BACK PAIN WITHOUT SCIATICA: Primary | ICD-10-CM

## 2017-04-10 DIAGNOSIS — M62.830 MUSCLE SPASM OF BACK: ICD-10-CM

## 2017-04-10 PROCEDURE — 97110 THERAPEUTIC EXERCISES: CPT | Mod: PN

## 2017-04-10 NOTE — PROGRESS NOTES
"                                                    Physical Therapy Daily Note     Name: Radha Fraga  Clinic Number: [id number]  Diagnosis:   Encounter Diagnoses   Name Primary?    Chronic midline low back pain without sciatica Yes    Poor motor control of trunk     Muscle tightness     Muscle spasm of back      Physician: Chicho Meléndez MD    Time In: 930  Time Out: 1040  Total Treatment Time:  70' (1:1 with PT for 30' of treatment session; 10 min ice)     Visit #: 5 of 20  MATHUR: 12/31/17  Certification Period: 3/27/17 - 5/27/17    Subjective     Pt reports Pt is feeling okay today, states she didn't perform HEP over weekend due to increased gardening and house activity.  Pain Scale: Radha rates pain on a scale of 0-10 to be 3 currently.    Objective     Radha received individual therapeutic exercises to develop strength, endurance, ROM, flexibility, posture and core stabilization for 50 minutes including:    Scifit Nustep 6 min  Calf Stretch 3x30"  Hamstring Str c/ strap 2x30"  Piriformis Str 3x30"  LTR c/ ball contra UE reach 2x10  DKTC c/ ball BUE reach 2x10  Supine adductor stretch 3 x 30''  TrA recruitment 2x10 x 5'' hold   TrA with SLR 15x ea LE  Prone hip extension x 20 ea  Seated Trunk Flexion Str c/ ball 3 min 3-way  Standing QL stretch against wall 2 x 20'' ea side    Shuttle 3 x 10 2-band   Piriformis stretch on shuttle 2 x 30''    SL Clam c/ GTB 15x B  Hip Add Matrix 30x 45#  Hip Abd Matrix 30x 45#    Radha received the following manual therapy techniques: STM lumbar paraspinals, deep cross arm myofascial release lumbar paraspinals, TPR to Sacral trigger points x 10 minutes     MHP concurrently with exercises x 10' at start of session  Ice pack x 10' in prone post treatment session     Written Home Exercises Reviewed.   Pt demo good understanding of the education provided. Radha demonstrated good return demonstration of activities.     Education provided re:  Radha verbalized good " understanding of education provided.   No spiritual or educational barriers to learning provided    Assessment     Patient tolerated treatment session well without symptom provocation throughout. Pt progressing well with increased resistance and repetitions on hip resistance training, with good ability to maintain exercise form. Pt continues to experience highly irritable, easily provoked trigger points in R superior gluteal musculature and SIJ. Pt to continue progressing towards increased resistance, flexibility, and functional mobility activities.    This is a 61 y.o. female referred to outpatient physical therapy and presents with a medical diagnosis of chronic midline low back pain without sciatica and demonstrates limitations as described in the problem list. Pt prognosis is Good. Pt will continue to benefit from skilled outpatient physical therapy to address the deficits listed in the problem list, provide pt/family education and to maximize pt's level of independence in the home and community environment.     Goals as follows:  Short Term GOALS: 4 weeks. Pt agrees with goals set.  1. Patient demonstrates independence with HEP.   2. Patient demonstrates independence with Postural Awareness.   3. Patient demonstrates increased strength BLE's by 1/2 muscle grade or greater on MMT to improve tolerance to functional activities pain free.   4. Patient will report pain of 4/10 at worst, on 0-10 pain scale, with all activity  5. Patient demonstrates improved symptoms and ability to perform forward bending, minimal symptom provocation, appropriate movement pattern to pick object off floor     Plan     Continue with established Plan of Care towards PT goals.    Therapist: Jesus Eduardo, PT  4/10/2017

## 2017-04-17 ENCOUNTER — CLINICAL SUPPORT (OUTPATIENT)
Dept: REHABILITATION | Facility: HOSPITAL | Age: 62
End: 2017-04-17
Attending: INTERNAL MEDICINE
Payer: COMMERCIAL

## 2017-04-17 DIAGNOSIS — G89.29 CHRONIC MIDLINE LOW BACK PAIN WITHOUT SCIATICA: Primary | ICD-10-CM

## 2017-04-17 DIAGNOSIS — M54.50 CHRONIC MIDLINE LOW BACK PAIN WITHOUT SCIATICA: Primary | ICD-10-CM

## 2017-04-17 DIAGNOSIS — M62.89 MUSCLE TIGHTNESS: ICD-10-CM

## 2017-04-17 DIAGNOSIS — M62.830 MUSCLE SPASM OF BACK: ICD-10-CM

## 2017-04-17 DIAGNOSIS — R29.818 POOR MOTOR CONTROL OF TRUNK: ICD-10-CM

## 2017-04-17 PROCEDURE — 97110 THERAPEUTIC EXERCISES: CPT | Mod: PN

## 2017-04-17 NOTE — PROGRESS NOTES
"                                                    Physical Therapy Daily Note     Name: Radha Fraga  Clinic Number: [id number]  Diagnosis:   Encounter Diagnoses   Name Primary?    Chronic midline low back pain without sciatica Yes    Poor motor control of trunk     Muscle tightness     Muscle spasm of back      Physician: Chicho Meléndez MD    Time In: 1030  Time Out: 1140  Total Treatment Time:  70' (1:1 with PT for 30' of treatment session; 10 min ice)     Visit #: 6 of 20  MATHUR: 12/31/17  Certification Period: 3/27/17 - 5/27/17    Subjective     Pt reports Pt states she is nearly pain-free. She reports mild tightness of L Lumbar musculature.  Pain Scale: Radha rates pain on a scale of 0-10 to be 1 currently.    Objective     Radha received individual therapeutic exercises to develop strength, endurance, ROM, flexibility, posture and core stabilization for 60 minutes including:    Scifit Nustep 6 min -NP  Calf Stretch 3x30"  Shuttle 3 x 15 2-band   Piriformis stretch on shuttle 2 x 30''  Seated Trunk Flexion Str c/ ball 3 min 3-way  Standing QL stretch against wall 2 x 20'' ea side    Hip Add Matrix 30x 45#  Hip Abd Matrix 30x 45#  Standing Hip Abd/Ext c/ RTB 2x10 ea    Hamstring Str c/ strap 2x30"  Piriformis Str 3x30"  LTR c/ ball contra UE reach 2x10  DKTC c/ ball BUE reach 2x10  Supine adductor stretch 3 x 30''  TrA recruitment 2x10 x 5'' hold   TrA with SLR 15x ea LE  Prone hip extension x 20 ea  Prone Ball Squeeze Hip ER 2x10  Sidelying Open Book 15x  SL Clam c/ GTB 15x B    Radha received the following manual therapy techniques: STM lumbar paraspinals, deep cross arm myofascial release lumbar paraspinals, TPR to Sacral trigger points x 0 minutes     MHP concurrently with exercises x 10' at start of session  Ice pack x 10' in prone post treatment session     Written Home Exercises Reviewed.   Pt demo good understanding of the education provided. Radha demonstrated good return demonstration of " activities.     Education provided re:  Radha verbalized good understanding of education provided.   No spiritual or educational barriers to learning provided    Assessment     Patient tolerated treatment session well without symptom provocation throughout. Pt experienced numbness in R great toe while performing deep piriformis stretch while on shuttle machine this session, likely related to increased compression in neuromuscular structures during flexibility training; symptoms quickly resolved with cessation of activity. Pt with good tolerance towards increased lumbar and hip AROM/strength training, with no significant difficulty observed throughout. Pt encouraged to enroll in local gym to maintain functional mobility progression following PT discharge next session.     This is a 61 y.o. female referred to outpatient physical therapy and presents with a medical diagnosis of chronic midline low back pain without sciatica and demonstrates limitations as described in the problem list. Pt prognosis is Good. Pt will continue to benefit from skilled outpatient physical therapy to address the deficits listed in the problem list, provide pt/family education and to maximize pt's level of independence in the home and community environment.     Goals as follows:  Short Term GOALS: 4 weeks. Pt agrees with goals set.  1. Patient demonstrates independence with HEP.   2. Patient demonstrates independence with Postural Awareness.   3. Patient demonstrates increased strength BLE's by 1/2 muscle grade or greater on MMT to improve tolerance to functional activities pain free.   4. Patient will report pain of 4/10 at worst, on 0-10 pain scale, with all activity  5. Patient demonstrates improved symptoms and ability to perform forward bending, minimal symptom provocation, appropriate movement pattern to pick object off floor     Category: Mobility  Tool: FOTO Lumbar Survey  Score: 37% Limitation   Modifier  Impairment Limitation  Restriction    CH  0 % impaired, limited or restricted    CI  @ least 1% but less than 20% impaired, limited or restricted    CJ  @ least 20%<40% impaired, limited or restricted    CK  @ least 40%<60% impaired, limited or restricted    CL  @ least 60% <80% impaired, limited or restricted    CM  @ least 80%<100% impaired limited or restricted    CN  100% impaired, limited or restricted     Current/: CJ = 20-40% Limitation   Goal/ : CJ = 20-40% Limitation        Plan     Continue with established Plan of Care towards PT goals.    Therapist: Jesus Eduardo, PT  4/17/2017

## 2017-04-28 ENCOUNTER — CLINICAL SUPPORT (OUTPATIENT)
Dept: REHABILITATION | Facility: HOSPITAL | Age: 62
End: 2017-04-28
Attending: INTERNAL MEDICINE
Payer: COMMERCIAL

## 2017-04-28 DIAGNOSIS — M62.89 MUSCLE TIGHTNESS: ICD-10-CM

## 2017-04-28 DIAGNOSIS — R29.818 POOR MOTOR CONTROL OF TRUNK: ICD-10-CM

## 2017-04-28 DIAGNOSIS — M54.50 CHRONIC MIDLINE LOW BACK PAIN WITHOUT SCIATICA: Primary | ICD-10-CM

## 2017-04-28 DIAGNOSIS — M62.830 MUSCLE SPASM OF BACK: ICD-10-CM

## 2017-04-28 DIAGNOSIS — G89.29 CHRONIC MIDLINE LOW BACK PAIN WITHOUT SCIATICA: Primary | ICD-10-CM

## 2017-04-28 DIAGNOSIS — S39.92XS TAILBONE INJURY, SEQUELA: ICD-10-CM

## 2017-04-28 DIAGNOSIS — M54.50 CHRONIC LOW BACK PAIN WITHOUT SCIATICA, UNSPECIFIED BACK PAIN LATERALITY: ICD-10-CM

## 2017-04-28 DIAGNOSIS — G89.29 CHRONIC LOW BACK PAIN WITHOUT SCIATICA, UNSPECIFIED BACK PAIN LATERALITY: ICD-10-CM

## 2017-04-28 PROCEDURE — 97110 THERAPEUTIC EXERCISES: CPT | Mod: PN

## 2017-04-28 NOTE — PROGRESS NOTES
"                                                    Physical Therapy Daily Note     Name: Radha Fraga  Clinic Number: [id number]  Diagnosis:   Encounter Diagnoses   Name Primary?    Chronic midline low back pain without sciatica Yes    Poor motor control of trunk     Muscle tightness     Muscle spasm of back     Chronic low back pain without sciatica, unspecified back pain laterality     Tailbone injury, sequela      Physician: Chicho Meléndez MD    Time In: 1000  Time Out: 1110  Total Treatment Time:  70' (1:1 with PTA for 30' of treatment session; 10 min ice)     Visit #: 7 of 20  MATHUR: 12/31/17  Certification Period: 3/27/17 - 5/27/17    Subjective     Pt reports that she is still having very minimal pain in her low back when she first wakes up in the morning.   Pain Scale: Radha rates pain on a scale of 0-10 to be 1 currently.    Objective     Radha received individual therapeutic exercises to develop strength, endurance, ROM, flexibility, posture and core stabilization for 60 minutes including:    Scifit Nustep 6 min  Calf Stretch 3x30"  Shuttle 3 x 15 3-band   Piriformis stretch on shuttle 2 x 30''  Seated Trunk Flexion Str c/ ball 3 min 3-way  Standing QL stretch against wall 2 x 20'' ea side    Hip Add Matrix 30x 45#  Hip Abd Matrix 30x 45#  Standing Hip Abd/Ext c/ RTB 2x10 ea    Hamstring Str c/ strap 2x30"  Piriformis Str 3x30" - np   LTR c/ ball contra UE reach 2x10  DKTC c/ ball BUE reach 2x10  Supine adductor stretch 3 x 30''  TrA recruitment 2x10 x 5'' hold   TrA with SLR 15x ea LE  Prone hip extension x 20 ea  Prone Ball Squeeze Hip ER 2x10  Sidelying Open Book 15x with YTB  SL Clam c/ GTB 15x B    Radha received the following manual therapy techniques: STM lumbar paraspinals, deep cross arm myofascial release lumbar paraspinals, TPR to Sacral trigger points x 0 minutes     MHP concurrently with exercises x 10' at start of session  Ice pack x 10' in prone post treatment session     Written " Home Exercises Reviewed.   Pt demo good understanding of the education provided. Radha demonstrated good return demonstration of activities.     Education provided re:  Radha verbalized good understanding of education provided.   No spiritual or educational barriers to learning provided    Assessment     Patient tolerated treatment session well today.  Patient with good tolerance to exercises without provocation of pain.  Patient with good recall of the exercises with very minimal  Prompting to perform with correct technique.   This is a 61 y.o. female referred to outpatient physical therapy and presents with a medical diagnosis of chronic midline low back pain without sciatica and demonstrates limitations as described in the problem list. Pt prognosis is Good. Pt will continue to benefit from skilled outpatient physical therapy to address the deficits listed in the problem list, provide pt/family education and to maximize pt's level of independence in the home and community environment.     Goals as follows:  Short Term GOALS: 4 weeks. Pt agrees with goals set.  1. Patient demonstrates independence with HEP.   2. Patient demonstrates independence with Postural Awareness.   3. Patient demonstrates increased strength BLE's by 1/2 muscle grade or greater on MMT to improve tolerance to functional activities pain free.   4. Patient will report pain of 4/10 at worst, on 0-10 pain scale, with all activity  5. Patient demonstrates improved symptoms and ability to perform forward bending, minimal symptom provocation, appropriate movement pattern to pick object off floor         Plan     Continue with established Plan of Care towards PT goals.    Therapist: Angelica Slater, PTA  4/28/2017

## 2017-05-01 ENCOUNTER — CLINICAL SUPPORT (OUTPATIENT)
Dept: REHABILITATION | Facility: HOSPITAL | Age: 62
End: 2017-05-01
Attending: INTERNAL MEDICINE
Payer: COMMERCIAL

## 2017-05-01 DIAGNOSIS — M54.50 CHRONIC MIDLINE LOW BACK PAIN WITHOUT SCIATICA: Primary | ICD-10-CM

## 2017-05-01 DIAGNOSIS — R29.818 POOR MOTOR CONTROL OF TRUNK: ICD-10-CM

## 2017-05-01 DIAGNOSIS — M62.89 MUSCLE TIGHTNESS: ICD-10-CM

## 2017-05-01 DIAGNOSIS — M62.830 MUSCLE SPASM OF BACK: ICD-10-CM

## 2017-05-01 DIAGNOSIS — G89.29 CHRONIC MIDLINE LOW BACK PAIN WITHOUT SCIATICA: Primary | ICD-10-CM

## 2017-05-01 PROCEDURE — 97110 THERAPEUTIC EXERCISES: CPT | Mod: PN

## 2017-05-01 NOTE — PROGRESS NOTES
"                                                    Physical Therapy Discharge Note     Name: Radha Fraga  Clinic Number: [id number]  Diagnosis:   Encounter Diagnoses   Name Primary?    Chronic midline low back pain without sciatica Yes    Poor motor control of trunk     Muscle tightness     Muscle spasm of back      Physician: Chicho Meléndez MD    Time In: 900  Time Out: 1000  Total Treatment Time:  60' (1:1 with PT for 30' of treatment session; 10 min ice)     Visit #: 8 of 20  MATHUR: 12/31/17  Certification Period: 3/27/17 - 5/27/17    Subjective     Pt reports Radha denies significant pain currently. She only experiences morning stiffness/aching, which doesn't limit her functionally. She is prepared for discharge following today's session.  Pain Scale: Radha rates pain on a scale of 0-10 to be 1-2 currently.    Objective     LUMBAR SPINE AROM:   Flexion: 90/60 - 30   Extension: 20/5 - 15   Left Sidebend: 30   Right Sidebend: 30   Left Rotation: WNL   Right Rotation: WNL     LOWER EXTREMITY STRENGTH:   Left Right   Quadriceps 5/5 5/5   Hamstrings 55 5/5     Iliopsoas 5/5  5/5   PGM 5/5 5/5   Hip IR 5/5 4+/5   Hip ER 5/5 5/5   Hip Ext 4+/5 4+/5       Radha received individual therapeutic exercises to develop strength, endurance, ROM, flexibility, posture and core stabilization for 60 minutes including:    Elliptical 8 min  Calf Stretch 3x30"  Seated Trunk Flexion 3-way c/ ball 3x10  Leg Press Matrix 35# 2x15  Chest Press Matrix 20# 2x15  Seated Row 25# 1x15   Triceps Matrix 30# 2x15  Biceps Matrix 20# 2x15  Leg Ext Matrix 20# 2x15  Leg Curl Matrix 25# 2x15  Hip Add Matrix 30x 45#  Hip Abd Matrix 30x 45#  Torso Rotation 20# 2x10    Radha received the following manual therapy techniques: STM lumbar paraspinals, deep cross arm myofascial release lumbar paraspinals, TPR to Sacral trigger points x 0 minutes     Written Home Exercises Reviewed.   Pt demo good understanding of the education provided. Radha " demonstrated good return demonstration of activities.     Education provided re:  Radha verbalized good understanding of education provided.   No spiritual or educational barriers to learning provided    Assessment     Patient tolerated treatment session well today, with discharge note performed. Pt achieved 5/5 short term goals and 4/5 long term goals with deficits remaining in full, normalized LE strength. Pt has maintained minimal level of symptoms during this episode of care, with symptoms primarily evident with morning stiffness and discomfort likely related to arthritic changes of lumbar spine; symptoms quickly resolved during daily activity and HEP. Pt with improvements in thoracolumbar ROM, strength, and motor control with ability to perform forward bending with proper movement pattern to pick object off floor without symptom provocation. Pt with good performance of machine resistance training this session in preparation for enrollment at local gym to maintain physical fitness and functional mobility progression. Pt is safe to be discharged and is at low-risk for re-injury.    Goals as follows:  Short Term GOALS: 4 weeks. Pt agrees with goals set.  1. Patient demonstrates independence with HEP. met  2. Patient demonstrates independence with Postural Awareness. met  3. Patient demonstrates increased strength BLE's by 1/2 muscle grade or greater on MMT to improve tolerance to functional activities pain free. met  4. Patient will report pain of 4/10 at worst, on 0-10 pain scale, with all activity met  5. Patient demonstrates improved symptoms and ability to perform forward bending, minimal symptom provocation, appropriate movement pattern to pick object off floor met     Long Term GOALS: 8 weeks. Pt agrees with goals set.  1. Patient demonstrates increased thoracolumbar ROM to WFL to improve tolerance to functional activities pain free.  met  2. Patient demonstrates increased strength BLE's to 5/5 to improve  tolerance to functional activities pain free. Partially met  3. Patient demonstrates improved overall function per FOTO Lumbar Survey to 40% Limitation or less. met  4. Patient will report pain of 2/10 at worst, on 0-10 pain scale, with all activity met  5. Patient demonstrates improved function and ability to perform heavy household activities without significant symptom provocation met      Functional Limitations Reports - G Codes  Category: Mobility  Tool: FOTO Lumbar Survey  Score: 37% Limitation   Modifier  Impairment Limitation Restriction    CH  0 % impaired, limited or restricted    CI  @ least 1% but less than 20% impaired, limited or restricted    CJ  @ least 20%<40% impaired, limited or restricted    CK  @ least 40%<60% impaired, limited or restricted    CL  @ least 60% <80% impaired, limited or restricted    CM  @ least 80%<100% impaired limited or restricted    CN  100% impaired, limited or restricted     Current/: CJ = 20-40% Limitation   Goal/ : CJ = 20-40% Limitation    Plan     Pt to be discharged from out-patient physical therapy services. Pt will contact MD or PT for any significant decline in functional status.    Therapist: Jesus Eduardo, PT  5/1/2017

## 2017-08-09 ENCOUNTER — OFFICE VISIT (OUTPATIENT)
Dept: OBSTETRICS AND GYNECOLOGY | Facility: CLINIC | Age: 62
End: 2017-08-09
Attending: OBSTETRICS & GYNECOLOGY
Payer: COMMERCIAL

## 2017-08-09 VITALS
WEIGHT: 177.5 LBS | BODY MASS INDEX: 25.41 KG/M2 | HEIGHT: 70 IN | DIASTOLIC BLOOD PRESSURE: 80 MMHG | SYSTOLIC BLOOD PRESSURE: 140 MMHG

## 2017-08-09 DIAGNOSIS — Z01.419 WELL WOMAN EXAM WITH ROUTINE GYNECOLOGICAL EXAM: Primary | ICD-10-CM

## 2017-08-09 PROCEDURE — 99396 PREV VISIT EST AGE 40-64: CPT | Mod: S$GLB,,, | Performed by: OBSTETRICS & GYNECOLOGY

## 2017-08-09 PROCEDURE — 99999 PR PBB SHADOW E&M-EST. PATIENT-LVL III: CPT | Mod: PBBFAC,,, | Performed by: OBSTETRICS & GYNECOLOGY

## 2017-08-09 PROCEDURE — 88175 CYTOPATH C/V AUTO FLUID REDO: CPT

## 2017-08-09 NOTE — PROGRESS NOTES
SUBJECTIVE:   62 y.o. female   for annual routine Pap and checkup. No LMP recorded. Patient is postmenopausal..      Past Medical History:   Diagnosis Date    Asymptomatic cholelithiasis     Hypothyroidism     Lichen planus     Scoliosis     Thyroid disease     Unspecified essential hypertension 3/25/2014     Past Surgical History:   Procedure Laterality Date    COLONOSCOPY      normal.    NOSE SURGERY      TONSILLECTOMY      TUBAL LIGATION       Social History     Social History    Marital status:      Spouse name: N/A    Number of children: N/A    Years of education: N/A     Occupational History    Not on file.     Social History Main Topics    Smoking status: Former Smoker     Quit date: 10/12/2004    Smokeless tobacco: Never Used    Alcohol use Yes      Comment: occassionally    Drug use: No    Sexual activity: Yes     Other Topics Concern    Not on file     Social History Narrative    No narrative on file     Family History   Problem Relation Age of Onset    Adopted: Yes    No Known Problems Daughter     No Known Problems Son     No Known Problems Son     Skin cancer Neg Hx      OB History    Para Term  AB Living   3 3       3   SAB TAB Ectopic Multiple Live Births           3      # Outcome Date GA Lbr Rigoberto/2nd Weight Sex Delivery Anes PTL Lv   3 Para      Vag-Spont   EMILEE   2 Para      Vag-Spont   EMILEE   1 Para      Vag-Spont   EMILEE          Current Outpatient Prescriptions   Medication Sig Dispense Refill    aspirin (ECOTRIN) 81 MG EC tablet Take 1 tablet (81 mg total) by mouth once daily. 100 tablet prn    levothyroxine (SYNTHROID) 75 MCG tablet Take 1 tablet (75 mcg total) by mouth before breakfast. 90 tablet 3    losartan (COZAAR) 100 MG tablet Take 1 tablet (100 mg total) by mouth once daily. 90 tablet 3    nadolol (CORGARD) 20 MG tablet Take 1 tablet (20 mg total) by mouth once daily. 30 tablet 11    valacyclovir (VALTREX) 500 MG tablet Take 1  "tablet (500 mg total) by mouth 2 (two) times daily. 30 tablet 3    fluocinonide (LIDEX) 0.05 % external solution Apply topically once daily. To scalp 60 mL 3    gabapentin (NEURONTIN) 300 MG capsule Take 1 capsule (300 mg total) by mouth 3 (three) times daily. 90 capsule 11    ketoconazole (NIZORAL) 2 % shampoo Apply topically every other day. 120 mL 5    triamcinolone acetonide 0.1% (KENALOG) 0.1 % cream Apply topically 2 (two) times daily. 30 g 3     No current facility-administered medications for this visit.      Allergies: Iodinated contrast- oral and iv dye     CHIEF COMPLAINT: She has no unusual complaints.   Annual visit Yes      ROS:  Constitutional: no weight loss, weight gain, fever, fatigue  Eyes:  No vision changes, glasses/contacts  ENT/Mouth: No ulcers, sinus problems, ears ringing, headache  Cardiovascular: No inability to lie flat, chest pain, exercise intolerance, swelling, heart palpitations  Respiratory: No wheezing, coughing blood, shortness of breath, or cough  Gastrointestinal: No diarrhea, bloody stool, nausea/vomiting, constipation, gas, hemorrhoids  Genitourinary: No blood in urine, painful urination, urgency of urination, frequency of urination, incomplete emptying, incontinence, abnormal bleeding, painful periods, heavy periods, vaginal discharge, vaginal odor, painful intercourse, sexual problems, bleeding after intercourse.  Musculoskeletal: No muscle weakness  Skin/Breast: No painful breasts, nipple discharge, masses, rash, ulcers  Neurological: No passing out, seizures, numbness, headache  Endocrine: No diabetes, hypothyroid, hyperthyroid, hot flashes, hair loss, abnormal hair growth, ance  Psychiatric: No depression, crying  Hematologic: No bruises, bleeding, swollen lymph nodes, anemia.      OBJECTIVE:   The patient appears well, alert, oriented x 3, in no distress.  BP (!) 140/80   Ht 5' 10" (1.778 m)   Wt 80.5 kg (177 lb 7.5 oz)   BMI 25.46 kg/m²   NECK: no thyromegaly, " trachea midline  SKIN: no acne, striae, hirsutism  BREAST EXAM: breasts appear normal, no suspicious masses, no skin or nipple changes or axillary nodes  ABDOMEN: no hernias, masses, or hepatosplenomegaly  GENITALIA: normal external genitalia, no erythema, no discharge  URETHRA: normal urethra, normal urethral meatus  VAGINA: Normal  CERVIX: no lesions or cervical motion tenderness  UTERUS: normal size, contour, position, consistency, mobility, non-tender  ADNEXA: no mass, fullness, tenderness      ASSESSMENT:   1. Well woman exam with routine gynecological exam        PLAN:   mammogram  pap smear  return annually or prn

## 2017-08-14 ENCOUNTER — HOSPITAL ENCOUNTER (OUTPATIENT)
Dept: RADIOLOGY | Facility: HOSPITAL | Age: 62
Discharge: HOME | End: 2017-08-14
Attending: OBSTETRICS & GYNECOLOGY
Payer: COMMERCIAL

## 2017-08-14 ENCOUNTER — PATIENT MESSAGE (OUTPATIENT)
Dept: OBSTETRICS AND GYNECOLOGY | Facility: CLINIC | Age: 62
End: 2017-08-14

## 2017-08-14 DIAGNOSIS — Z12.31 ENCOUNTER FOR SCREENING MAMMOGRAM FOR BREAST CANCER: ICD-10-CM

## 2017-08-14 PROCEDURE — 77067 SCR MAMMO BI INCL CAD: CPT | Mod: TC

## 2017-08-14 PROCEDURE — 77067 SCR MAMMO BI INCL CAD: CPT | Mod: 26,,, | Performed by: RADIOLOGY

## 2017-08-15 ENCOUNTER — PATIENT MESSAGE (OUTPATIENT)
Dept: OBSTETRICS AND GYNECOLOGY | Facility: CLINIC | Age: 62
End: 2017-08-15

## 2017-10-12 ENCOUNTER — OFFICE VISIT (OUTPATIENT)
Dept: DERMATOLOGY | Facility: CLINIC | Age: 62
End: 2017-10-12
Payer: COMMERCIAL

## 2017-10-12 DIAGNOSIS — L81.4 LENTIGO: ICD-10-CM

## 2017-10-12 DIAGNOSIS — L21.9 SEBORRHEIC DERMATITIS: ICD-10-CM

## 2017-10-12 DIAGNOSIS — D22.9 NEVUS: ICD-10-CM

## 2017-10-12 DIAGNOSIS — D18.00 ANGIOMA: ICD-10-CM

## 2017-10-12 DIAGNOSIS — L57.0 AK (ACTINIC KERATOSIS): ICD-10-CM

## 2017-10-12 DIAGNOSIS — D48.9 NEOPLASM OF UNCERTAIN BEHAVIOR: Primary | ICD-10-CM

## 2017-10-12 DIAGNOSIS — L82.1 SK (SEBORRHEIC KERATOSIS): ICD-10-CM

## 2017-10-12 PROCEDURE — 17000 DESTRUCT PREMALG LESION: CPT | Mod: S$GLB,,, | Performed by: DERMATOLOGY

## 2017-10-12 PROCEDURE — 99214 OFFICE O/P EST MOD 30 MIN: CPT | Mod: 25,S$GLB,, | Performed by: DERMATOLOGY

## 2017-10-12 PROCEDURE — 17003 DESTRUCT PREMALG LES 2-14: CPT | Mod: S$GLB,,, | Performed by: DERMATOLOGY

## 2017-10-12 PROCEDURE — 99999 PR PBB SHADOW E&M-EST. PATIENT-LVL II: CPT | Mod: PBBFAC,,, | Performed by: DERMATOLOGY

## 2017-10-12 PROCEDURE — 88305 TISSUE EXAM BY PATHOLOGIST: CPT | Performed by: PATHOLOGY

## 2017-10-12 PROCEDURE — 11100 PR BIOPSY OF SKIN LESION: CPT | Mod: 59,S$GLB,, | Performed by: DERMATOLOGY

## 2017-10-12 RX ORDER — POLY-UREAURETHANE 16 %
NAIL FILM SOLUTION (ML) TOPICAL
COMMUNITY
Start: 2017-09-26 | End: 2018-02-16

## 2017-10-12 NOTE — PROGRESS NOTES
"  Subjective:       Patient ID:  Radha Fraga is a 62 y.o. female who presents for   Chief Complaint   Patient presents with    Skin Check    Lesion    Hair/Scalp Problem     Patient is here today for a "mole" check.   Pt has a history of  moderate sun exposure in the past. Was a swimmer  Pt recalls several blistering sunburns in the past- none  Pt has history of tanning bed use- maybe once  Pt has  had moles removed in the past- yes, chin, left lateral calf. Both benign  Pt has history of melanoma in first degree relatives-  Adopted.  C/i lesion left lateral calf x 4 months.  Denies pain. Bleeds when shaved. No tx.  C/o irritated lesion on right temple for several weeks. Itches. No tx.   Also recurrent scalp itching.  Was using [employer] clear.  Cannot find product any more.   Needs alternative      Lesion     Hair/Scalp Problem         Review of Systems   Constitutional: Negative for fever, chills, fatigue and malaise.   Skin: Positive for daily sunscreen use. Negative for activity-related sunscreen use.   Hematologic/Lymphatic: Does not bruise/bleed easily.        Objective:    Physical Exam   Constitutional: She appears well-developed and well-nourished. No distress.   Neurological: She is alert and oriented to person, place, and time. She is not disoriented.   Psychiatric: She has a normal mood and affect.   Skin:   Areas Examined (abnormalities noted in diagram):   Scalp / Hair Palpated and Inspected  Head / Face Inspection Performed  Neck Inspection Performed  Chest / Axilla Inspection Performed  Abdomen Inspection Performed  Genitals / Buttocks / Groin Inspection Performed  Back Inspection Performed  RUE Inspected  LUE Inspection Performed  RLE Inspected  LLE Inspection Performed  Nails and Digits Inspection Performed                           Diagram Legend     Erythematous scaling macule/papule c/w actinic keratosis       Vascular papule c/w angioma      Pigmented verrucoid papule/plaque c/w " seborrheic keratosis      Yellow umbilicated papule c/w sebaceous hyperplasia      Irregularly shaped tan macule c/w lentigo     1-2 mm smooth white papules consistent with Milia      Movable subcutaneous cyst with punctum c/w epidermal inclusion cyst      Subcutaneous movable cyst c/w pilar cyst      Firm pink to brown papule c/w dermatofibroma      Pedunculated fleshy papule(s) c/w skin tag(s)      Evenly pigmented macule c/w junctional nevus     Mildly variegated pigmented, slightly irregular-bordered macule c/w mildly atypical nevus      Flesh colored to evenly pigmented papule c/w intradermal nevus       Pink pearly papule/plaque c/w basal cell carcinoma      Erythematous hyperkeratotic cursted plaque c/w SCC      Surgical scar with no sign of skin cancer recurrence      Open and closed comedones      Inflammatory papules and pustules      Verrucoid papule consistent consistent with wart     Erythematous eczematous patches and plaques     Dystrophic onycholytic nail with subungual debris c/w onychomycosis     Umbilicated papule    Erythematous-base heme-crusted tan verrucoid plaque consistent with inflamed seborrheic keratosis     Erythematous Silvery Scaling Plaque c/w Psoriasis     See annotation      Assessment / Plan:      Pathology Orders:      Normal Orders This Visit    Tissue Specimen To Pathology, Dermatology     Questions:    Directional Terms:  Other(comment)    Clinical information:  r/o inflamed wart    Specific Site:  left lower lateral leg        Neoplasm of uncertain behavior  Shave biopsy procedure note:    Shave biopsy performed after verbal consent including risk of infection, scar, recurrence, need for additional treatment of site. Area prepped with alcohol, anesthetized with approximately 1.0cc of 1% lidocaine with epinephrine. Lesional tissue shaved with razor blade. Hemostasis achieved with application of aluminum chloride followed by hyfrecation. No complications. Dressing applied. Wound  care explained.      -     Tissue Specimen To Pathology, Dermatology    AK (actinic keratosis)  Cryosurgery Procedure Note    Verbal consent from the patient is obtained and the patient is aware of the precancerous quality and need for treatment of these lesions. Liquid nitrogen cryosurgery is applied to the 2 actinic keratoses, as detailed in the physical exam, to produce a freeze injury. The patient is aware that blisters may form and is instructed on wound care with gentle cleansing and use of vaseline ointment to keep moist until healed. The patient is supplied a handout on cryosurgery and is instructed to call if lesions do not completely resolve.    Lentigo  This is a benign hyperpigmented sun induced lesion. Daily sun protection will reduce the number of new lesions. Treatment of these benign lesions are considered cosmetic.  The nature of sun-induced photo-aging and skin cancers is discussed.  Sun avoidance, protective clothing, and the use of 30-SPF sunscreens is advised. Observe closely for skin damage/changes, and call if such occurs.    Angioma  These are benign vascular lesions that are inherited.  Treatment is not necessary.    Nevus  Discussed ABCDE's of nevi.  Monitor for new mole or moles that are becoming bigger, darker, irritated, or developing irregular borders. Brochure provided.    SK (seborrheic keratosis)  These are benign inherited growths without a malignant potential. Reassurance given to patient. No treatment is necessary.     Seborrheic dermatitis  otc antidandruff shampoos      Teamine, retinol .25, vit c lotion   Total body skin examination performed today including at least 12 points as noted in physical examination. No lesions suspicious for malignancy noted.             Return in about 1 year (around 10/12/2018) for prn bx report.

## 2017-10-19 ENCOUNTER — PATIENT MESSAGE (OUTPATIENT)
Dept: DERMATOLOGY | Facility: CLINIC | Age: 62
End: 2017-10-19

## 2017-11-16 ENCOUNTER — OFFICE VISIT (OUTPATIENT)
Dept: FAMILY MEDICINE | Facility: CLINIC | Age: 62
End: 2017-11-16
Payer: COMMERCIAL

## 2017-11-16 VITALS
WEIGHT: 183.31 LBS | SYSTOLIC BLOOD PRESSURE: 122 MMHG | BODY MASS INDEX: 26.24 KG/M2 | HEIGHT: 70 IN | HEART RATE: 64 BPM | TEMPERATURE: 99 F | DIASTOLIC BLOOD PRESSURE: 86 MMHG | OXYGEN SATURATION: 97 %

## 2017-11-16 DIAGNOSIS — J02.9 SORE THROAT: ICD-10-CM

## 2017-11-16 DIAGNOSIS — E03.8 HYPOTHYROIDISM DUE TO HASHIMOTO'S THYROIDITIS: ICD-10-CM

## 2017-11-16 DIAGNOSIS — E06.3 HYPOTHYROIDISM DUE TO HASHIMOTO'S THYROIDITIS: ICD-10-CM

## 2017-11-16 DIAGNOSIS — Z23 NEEDS FLU SHOT: ICD-10-CM

## 2017-11-16 DIAGNOSIS — J02.9 ACUTE VIRAL PHARYNGITIS: Primary | ICD-10-CM

## 2017-11-16 DIAGNOSIS — E03.8 HYPOTHYROIDISM DUE TO HASHIMOTO'S THYROIDITIS: Primary | ICD-10-CM

## 2017-11-16 DIAGNOSIS — I10 ESSENTIAL HYPERTENSION: ICD-10-CM

## 2017-11-16 DIAGNOSIS — E06.3 HYPOTHYROIDISM DUE TO HASHIMOTO'S THYROIDITIS: Primary | ICD-10-CM

## 2017-11-16 DIAGNOSIS — Z00.00 ANNUAL PHYSICAL EXAM: ICD-10-CM

## 2017-11-16 PROCEDURE — 90686 IIV4 VACC NO PRSV 0.5 ML IM: CPT | Mod: S$GLB,,, | Performed by: FAMILY MEDICINE

## 2017-11-16 PROCEDURE — 99214 OFFICE O/P EST MOD 30 MIN: CPT | Mod: 25,S$GLB,, | Performed by: FAMILY MEDICINE

## 2017-11-16 PROCEDURE — 90471 IMMUNIZATION ADMIN: CPT | Mod: S$GLB,,, | Performed by: FAMILY MEDICINE

## 2017-11-16 PROCEDURE — 99999 PR PBB SHADOW E&M-EST. PATIENT-LVL III: CPT | Mod: PBBFAC,,, | Performed by: FAMILY MEDICINE

## 2017-11-16 RX ORDER — AMOXICILLIN 875 MG/1
875 TABLET, FILM COATED ORAL EVERY 12 HOURS
Qty: 14 TABLET | Refills: 0 | Status: SHIPPED | OUTPATIENT
Start: 2017-11-16 | End: 2018-02-16 | Stop reason: ALTCHOICE

## 2017-11-16 NOTE — PROGRESS NOTES
Office Visit    Patient Name: Radha Fraga    : 1955  MRN: [id number]      Assessment/Plan:  Radha Fraga is a 62 y.o. female who presents today for :    Acute viral pharyngitis    Needs flu shot  -     Influenza - Quadrivalent (3 years & older) (PF)    Sore throat  -     amoxicillin (AMOXIL) 875 MG tablet; Take 1 tablet (875 mg total) by mouth every 12 (twelve) hours.  Dispense: 14 tablet; Refill: 0      -Mild symptoms, most likely viral etiology  -AFVSS - advised pt on natural progression of viral illness with reassurance provided - if Sx don't improve in 3-4 days, , may consider starting Abx  -advised frequent hand washing, rest, and plenty of fluids. Tylenol as needed for pain- may use honey/cough drops/Cepacol as needed for throat pain.  -may call or RTC if pt develops fever, or if pain and general Sx worsens.      Hypothyroidism due to Hashimoto's thyroiditis  -last TSH shows   Lab Results   Component Value Date    TSH 2.696 2016     -recheck TSH   -cont Synthroid, has enough refills at this time.    Return for worsening Sx. Urgent care/ED precautions provided.     This note was created by combination of typed  and Dragon dictation.  Transcription errors may be present.  If there are any questions, please contact me.      ----------------------------------------------------------------------------------------------------------------------      HPI:  Radha is a 62 y.o. female who presents today for:    Sore Throat        This patient has multiple medical diagnoses as noted below.    This patient is new to me   Patient is here today for evaluation of sore throat that patient has had since yesterday morning, with associated ear discomfort that started this morning.  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  No pets/recent travels/allergies       Hypothyroidism  She is also on Synthroid 75mcg daily. Last TSH wnl. Tolerating medication well.  Last TSH   Lab Results   Component Value Date    TSH 2.696 11/28/2016     DENIES fatigue/generalized arthralgias or myalgia/cold intolerance/tremors/wt gain/dry skin/hair loss          Additional ROS    No dysphagia  No CP/SOB/palpitations/swelling  No nausea/vomiting/abd pain/blood in stool, no diarrhea, no constipation  No muscle aches/joint pain  No rashes    Patient Active Problem List   Diagnosis    Asymptomatic cholelithiasis    Lichen planus    Scoliosis    Essential hypertension    Tubular adenoma of colon    Alopecia areata    Migraine syndrome    Hypothyroidism due to Hashimoto's thyroiditis    Lower abdominal pain, unspecified    Tailbone injury    Chronic back pain    Muscle spasm of back       Past Surgical History:   Procedure Laterality Date    COLONOSCOPY  2006    normal.    NOSE SURGERY      TONSILLECTOMY      TUBAL LIGATION         Family History   Problem Relation Age of Onset    Adopted: Yes    No Known Problems Daughter     No Known Problems Son     No Known Problems Son     Skin cancer Neg Hx        Social History     Social History    Marital status:      Spouse name: N/A    Number of children: N/A    Years of education: N/A     Occupational History    Not on file.     Social History Main Topics    Smoking status: Former Smoker     Quit date: 10/12/2004    Smokeless tobacco: Never Used    Alcohol use Yes      Comment: occassionally    Drug use: No    Sexual activity: Yes     Other Topics Concern    Not on file     Social History Narrative    No narrative on file       Current Medications  Medications reviewed and updated.     Allergies   Review of patient's allergies indicates:   Allergen Reactions    Iodinated contrast- oral and iv dye              Review of Systems  See  "HPI      Physical Exam  /86 (BP Location: Right arm, Patient Position: Sitting, BP Method: Large (Manual))   Pulse 64   Temp 98.6 °F (37 °C) (Oral)   Ht 5' 10" (1.778 m)   Wt 83.2 kg (183 lb 5 oz)   SpO2 97%   BMI 26.30 kg/m²     GEN: NAD, well developed, appears tired  HEENT: NCAT, PERRLA, EOMI, sclera clear, anicteric, TM clear bilaterally with normal light reflex, mild nasal turbinate swelling, MMM with no lesions, O/P clear with minimal erythema - no tonsillar swelling/discharge, no drainge  NECK: normal, supple with midline trachea, no LAD, no thyromegaly  LUNGS: CTAB, no w/r/r, no increased work of breathing  HEART: RRR, normal S1 and S2, no m/r/g  ABD: s/nt/nd, NABS  SKIN: normal turgor, no rashes, no other lesions.   PSYCH: AOx3, appropriate mood and affect    "

## 2018-02-08 ENCOUNTER — PATIENT MESSAGE (OUTPATIENT)
Dept: DERMATOLOGY | Facility: CLINIC | Age: 63
End: 2018-02-08

## 2018-02-08 ENCOUNTER — PATIENT MESSAGE (OUTPATIENT)
Dept: FAMILY MEDICINE | Facility: CLINIC | Age: 63
End: 2018-02-08

## 2018-02-16 ENCOUNTER — OFFICE VISIT (OUTPATIENT)
Dept: FAMILY MEDICINE | Facility: CLINIC | Age: 63
End: 2018-02-16
Payer: COMMERCIAL

## 2018-02-16 VITALS
WEIGHT: 183.75 LBS | SYSTOLIC BLOOD PRESSURE: 138 MMHG | HEART RATE: 77 BPM | HEIGHT: 70 IN | OXYGEN SATURATION: 96 % | DIASTOLIC BLOOD PRESSURE: 98 MMHG | TEMPERATURE: 98 F | BODY MASS INDEX: 26.31 KG/M2

## 2018-02-16 DIAGNOSIS — J11.1 INFLUENZA: Primary | ICD-10-CM

## 2018-02-16 PROCEDURE — 99999 PR PBB SHADOW E&M-EST. PATIENT-LVL III: CPT | Mod: PBBFAC,,, | Performed by: FAMILY MEDICINE

## 2018-02-16 PROCEDURE — 3008F BODY MASS INDEX DOCD: CPT | Mod: S$GLB,,, | Performed by: FAMILY MEDICINE

## 2018-02-16 PROCEDURE — 99214 OFFICE O/P EST MOD 30 MIN: CPT | Mod: S$GLB,,, | Performed by: FAMILY MEDICINE

## 2018-02-16 RX ORDER — CODEINE PHOSPHATE AND GUAIFENESIN 10; 100 MG/5ML; MG/5ML
5 SOLUTION ORAL EVERY 8 HOURS PRN
Qty: 180 ML | Refills: 0 | Status: SHIPPED | OUTPATIENT
Start: 2018-02-16 | End: 2018-02-26

## 2018-02-16 RX ORDER — OSELTAMIVIR PHOSPHATE 75 MG/1
75 CAPSULE ORAL DAILY
Qty: 10 CAPSULE | Refills: 0 | Status: SHIPPED | OUTPATIENT
Start: 2018-02-16 | End: 2018-02-21

## 2018-02-16 NOTE — PROGRESS NOTES
"[facility]  Progress Note    SUBJECTIVE:     Chief Complaint   Patient presents with    flu like symptoms       HPI   Radha Fraga  is a 62 y.o. female here for c/o cough, congestion, and mild body aches. Both of her grandsons was dx with positive flu, which she was taking care of this past week. No real fevers, just felt "warm". Cough is slightly productive, and otc cough meds not working. No chest pain, SOB.    Review of patient's allergies indicates:   Allergen Reactions    Iodinated contrast- oral and iv dye        Past Medical History:   Diagnosis Date    Asymptomatic cholelithiasis     Hypothyroidism     Lichen planus     Scoliosis     Thyroid disease     Unspecified essential hypertension 3/25/2014     Past Surgical History:   Procedure Laterality Date    COLONOSCOPY  2006    normal.    NOSE SURGERY      TONSILLECTOMY      TUBAL LIGATION       Family History   Problem Relation Age of Onset    Adopted: Yes    No Known Problems Daughter     No Known Problems Son     No Known Problems Son     Skin cancer Neg Hx      Social History   Substance Use Topics    Smoking status: Former Smoker     Quit date: 10/12/2004    Smokeless tobacco: Never Used    Alcohol use Yes      Comment: occassionally        Review of Systems   Constitutional: Negative for chills, fever and malaise/fatigue.   HENT: Positive for congestion.    Respiratory: Positive for cough. Negative for shortness of breath.    Cardiovascular: Negative.  Negative for chest pain.   Gastrointestinal: Negative.  Negative for abdominal pain, nausea and vomiting.   Genitourinary: Negative.    Neurological: Negative for weakness and headaches.   All other systems reviewed and are negative.    OBJECTIVE:     Vitals:    02/16/18 1412   BP: (!) 138/98   Pulse: 77   Temp: 98.4 °F (36.9 °C)     Body mass index is 26.37 kg/m².    Physical Exam   Constitutional: She is oriented to person, place, and time and well-developed, " well-nourished, and in no distress. No distress.   HENT:   Head: Normocephalic and atraumatic.   Right Ear: External ear normal. Tympanic membrane is not perforated, not erythematous, not retracted and not bulging. No hemotympanum.   Left Ear: External ear normal. Tympanic membrane is not perforated, not erythematous, not retracted and not bulging. No hemotympanum.   Nose: Nose normal.   Mouth/Throat: Oropharynx is clear and moist. No oropharyngeal exudate.   Eyes: Conjunctivae and EOM are normal.   Cardiovascular: Normal rate, regular rhythm and normal heart sounds.  Exam reveals no gallop and no friction rub.    No murmur heard.  Pulmonary/Chest: Effort normal and breath sounds normal. No respiratory distress. She has no wheezes. She has no rales. She exhibits no tenderness.   Abdominal: Soft. Bowel sounds are normal. She exhibits no distension. There is no tenderness. There is no rebound.   Neurological: She is alert and oriented to person, place, and time.   Skin: Skin is warm. She is not diaphoretic.       Old records were reviewed. Labs and/or images were independently reviewed.    ASSESSMENT     1. Influenza        PLAN:     Influenza  -     guaifenesin-codeine 100-10 mg/5 ml (CHERATUSSIN AC)  mg/5 mL syrup; Take 5 mLs by mouth every 8 (eight) hours as needed for Cough or Congestion.  Dispense: 180 mL; Refill: 0  -     oseltamivir (TAMIFLU) 75 MG capsule; Take 1 capsule (75 mg total) by mouth once daily.  Dispense: 10 capsule; Refill: 0  -     Will treat as prophylactic thearpy for flu since both grandsons had flu +.   -     OK to take tylenol as needed PRN fever. Take mucinex and or claritin to help decrease congestion. Educated patient to drink plenty of fluids. Instructed patient to call or RTC if symptoms persist or worsen.    RTC PRN    Wilber Eduardo MD  02/16/2018 3:52 PM

## 2018-03-01 NOTE — PROGRESS NOTES
This note was created by combination of typed  and Dragon dictation.  Transcription errors may be present.  If there are any questions, please contact me.    Assessment / Plan:   Normal physical exam - nonfasting, last lipid was good, check labs with TC, HDL.  Needs to improve diet and increase physical activity.  -     Comprehensive metabolic panel; Future; Expected date: 03/02/2018  -     CBC auto differential; Future; Expected date: 03/02/2018  -     Hemoglobin A1c; Future; Expected date: 03/02/2018  -     TSH; Future; Expected date: 03/02/2018  -     Cholesterol, total; Future; Expected date: 03/02/2018  -     HDL CHOLESTEROL; Future; Expected date: 03/02/2018    Hypothyroidism due to Hashimoto's thyroiditis  -check levels, refilled levothyroxine 75  -     TSH; Future; Expected date: 03/02/2018  -     levothyroxine (SYNTHROID) 75 MCG tablet; Take 1 tablet (75 mcg total) by mouth before breakfast.  Dispense: 90 tablet; Refill: 3    Chronic low back pain, no longer seeing specialist, actually found that more physical activity was helpful and she is aware of the need for increased physical activity.    Tubular adenoma of colon - colonoscopy ordered.  No symptoms.  -     Case request GI: COLONOSCOPY    Medications Discontinued During This Encounter   Medication Reason    levothyroxine (SYNTHROID) 75 MCG tablet Reorder     Modified Medications    Modified Medication Previous Medication    LEVOTHYROXINE (SYNTHROID) 75 MCG TABLET levothyroxine (SYNTHROID) 75 MCG tablet       Take 1 tablet (75 mcg total) by mouth before breakfast.    Take 1 tablet (75 mcg total) by mouth before breakfast.     New Prescriptions    No medications on file         Follow Up: No Follow-up on file. If all normal PEx 1 year.      Subjective:     Chief Complaint   Patient presents with    Annual Exam       HPI  Radha is a 62 y.o. female, last appointment with this clinic was 2/16/2018.    No LMP recorded. Patient is  postmenopausal.    Hx HTN, improved with weight loss. Hx of losartan  Hypothyroid  Asymptomatic gallstones  OA L spine; Dr. Latham  8/17/2016 MRI L spine: Significant degenerative disc disease at L5-S1 resulting in mild bilateral neuroforaminal narrowing.  Otherwise mild multilevel lumbar spondylosis.   6/17/2016 MRI T spine: 1.  Mild degenerative changes of the thoracic spine. No spinal canal stenosis or neuroforaminal narrowing.  Spinal cord signal and morphology are maintained. 2.  Cholelithiasis. 3.  Subcentimeter T2 hyperintense lesion at the liver dome incompletely characterized.  1/18/2013 colonoscopy Colon biopsy, sigmoid-hyperplastic polyp with a suggestion of focal early adenomatous change, melanosis coli.  Headaches, rare use of NSAIDS otc.    5/2015 FLP OK  11/2016 TSH WNL  Needs colonoscopy, labs.    Needs to move more - actually helps with pain.  Motivation is the barrier.   BP good today.  Not on BP meds.  Daughter wants her to try SL B12.    Diet could improve - weakness for bread, chocolate  No sweet drinks.  Physical activity - needs to improve.    Patient Care Team:  Chicho Meléndez MD as PCP - General (Internal Medicine)  Edgard Multani II, MD as Consulting Physician (Endocrinology)  Tatianna Latham MD as Consulting Physician (Physical Medicine and Rehabilitation)  Josh Daily MD (Inactive) as Obstetrician (Obstetrics)    Patient Active Problem List    Diagnosis Date Noted    Chronic back pain 08/10/2016     8/10/2016 MRI L spine: 1. Significant degenerative disc disease at L5-S1 resulting in mild bilateral neuroforaminal narrowing.  Otherwise mild multilevel lumbar spondylosis. 2. Cholelithiasis.  MRI of T spine showed: 1. Mild degenerative changes of the thoracic spine. No spinal canal stenosis or neuroforaminal narrowing.  Spinal cord signal and morphology are maintained.2.  Cholelithiasis. 3.  Subcentimeter T2 hyperintense lesion at the liver dome incompletely characterized.       Hypothyroidism due to Hashimoto's thyroiditis 06/16/2015    Migraine syndrome 05/19/2015     Symptoms - tightness in the back of the neck and scotomas - really more opthalmic migraine.  5/18/2015 MRI brain neg  5/18/2015 carotid US negative.      History of hypertension 03/25/2014    Tubular adenoma of colon 03/25/2014 1/11/2013 colonoscopy with sigmoid hyperplastic polyp with suggestion of focal early adenomatous change; melanosis coli.      Alopecia areata 03/25/2014    Asymptomatic cholelithiasis 10/13/2012    Lichen planus 10/13/2012    Scoliosis 10/13/2012       PAST MEDICAL HISTORY:  Past Medical History:   Diagnosis Date    Asymptomatic cholelithiasis     Hypothyroidism     Lichen planus     Scoliosis     Unspecified essential hypertension 3/25/2014     Family History   Problem Relation Age of Onset    Adopted: Yes    No Known Problems Daughter     Hypertension Son     No Known Problems Son     Skin cancer Neg Hx        PAST SURGICAL HISTORY:  Past Surgical History:   Procedure Laterality Date    COLONOSCOPY  2006    normal.   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  Review of patient's allergies indicates:   Allergen Reactions    Iodinated contrast- oral and iv dye      Current Outpatient Prescriptions   Medication Sig Dispense Refill    aspirin (ECOTRIN) 81 MG EC tablet Take 1  "tablet (81 mg total) by mouth once daily. 100 tablet prn    levothyroxine (SYNTHROID) 75 MCG tablet Take 1 tablet (75 mcg total) by mouth before breakfast. 90 tablet 3     No current facility-administered medications for this visit.        Review of Systems   Constitutional: Negative for fever, malaise/fatigue and weight loss.   HENT: Negative for congestion.    Eyes: Negative for blurred vision and pain.   Respiratory: Negative for shortness of breath and wheezing.    Cardiovascular: Negative for chest pain, palpitations and leg swelling.   Gastrointestinal: Negative for abdominal pain, blood in stool, constipation, diarrhea and heartburn.   Genitourinary: Negative for dysuria, hematuria and urgency.   Musculoskeletal: Positive for back pain. Negative for joint pain.   Neurological: Negative for tingling, focal weakness, weakness and headaches.       Objective:   Physical Exam   Vitals:    03/02/18 1023   BP: 132/88   Pulse: 78   Temp: 98 °F (36.7 °C)   SpO2: 98%   Weight: 83.1 kg (183 lb 3.2 oz)   Height: 5' 9" (1.753 m)    Body mass index is 27.05 kg/m².  Weight: 83.1 kg (183 lb 3.2 oz)   Height: 5' 9" (175.3 cm)     Physical Exam   Constitutional: She is oriented to person, place, and time. She appears well-developed and well-nourished.   HENT:   Right Ear: Tympanic membrane, external ear and ear canal normal.   Left Ear: Tympanic membrane, external ear and ear canal normal.   Mouth/Throat: Oropharynx is clear and moist.   Eyes: EOM are normal. Pupils are equal, round, and reactive to light. No scleral icterus.   Neck: Neck supple. No thyromegaly present.   Cardiovascular: Normal rate, regular rhythm and normal heart sounds.    No murmur heard.  Pulmonary/Chest: Effort normal and breath sounds normal. She has no wheezes.   Abdominal: Soft. She exhibits no mass. There is no splenomegaly or hepatomegaly. There is no tenderness.   Musculoskeletal: Normal range of motion. She exhibits no edema or deformity. "   Lymphadenopathy:     She has no cervical adenopathy.   Neurological: She is alert and oriented to person, place, and time. She has normal reflexes.   Skin: Skin is warm and dry. No rash noted.   On exposed skin   Psychiatric: She has a normal mood and affect. Her behavior is normal. Judgment and thought content normal.

## 2018-03-02 ENCOUNTER — LAB VISIT (OUTPATIENT)
Dept: LAB | Facility: HOSPITAL | Age: 63
End: 2018-03-02
Attending: INTERNAL MEDICINE
Payer: COMMERCIAL

## 2018-03-02 ENCOUNTER — PATIENT MESSAGE (OUTPATIENT)
Dept: FAMILY MEDICINE | Facility: CLINIC | Age: 63
End: 2018-03-02

## 2018-03-02 ENCOUNTER — OFFICE VISIT (OUTPATIENT)
Dept: FAMILY MEDICINE | Facility: CLINIC | Age: 63
End: 2018-03-02
Payer: COMMERCIAL

## 2018-03-02 VITALS
SYSTOLIC BLOOD PRESSURE: 132 MMHG | TEMPERATURE: 98 F | HEIGHT: 69 IN | BODY MASS INDEX: 27.13 KG/M2 | HEART RATE: 78 BPM | DIASTOLIC BLOOD PRESSURE: 88 MMHG | WEIGHT: 183.19 LBS | OXYGEN SATURATION: 98 %

## 2018-03-02 DIAGNOSIS — E03.8 HYPOTHYROIDISM DUE TO HASHIMOTO'S THYROIDITIS: Chronic | ICD-10-CM

## 2018-03-02 DIAGNOSIS — E06.3 HYPOTHYROIDISM DUE TO HASHIMOTO'S THYROIDITIS: Chronic | ICD-10-CM

## 2018-03-02 DIAGNOSIS — Z00.00 NORMAL PHYSICAL EXAM: Primary | ICD-10-CM

## 2018-03-02 DIAGNOSIS — Z00.00 NORMAL PHYSICAL EXAM: ICD-10-CM

## 2018-03-02 DIAGNOSIS — D12.6 TUBULAR ADENOMA OF COLON: ICD-10-CM

## 2018-03-02 LAB
ALBUMIN SERPL BCP-MCNC: 3.6 G/DL
ALP SERPL-CCNC: 72 U/L
ALT SERPL W/O P-5'-P-CCNC: 18 U/L
ANION GAP SERPL CALC-SCNC: 8 MMOL/L
AST SERPL-CCNC: 19 U/L
BASOPHILS # BLD AUTO: 0.03 K/UL
BASOPHILS NFR BLD: 0.4 %
BILIRUB SERPL-MCNC: 0.6 MG/DL
BUN SERPL-MCNC: 20 MG/DL
CALCIUM SERPL-MCNC: 9.9 MG/DL
CHLORIDE SERPL-SCNC: 107 MMOL/L
CHOLEST SERPL-MCNC: 212 MG/DL
CO2 SERPL-SCNC: 27 MMOL/L
CREAT SERPL-MCNC: 0.9 MG/DL
DIFFERENTIAL METHOD: NORMAL
EOSINOPHIL # BLD AUTO: 0.1 K/UL
EOSINOPHIL NFR BLD: 1.1 %
ERYTHROCYTE [DISTWIDTH] IN BLOOD BY AUTOMATED COUNT: 13.2 %
EST. GFR  (AFRICAN AMERICAN): >60 ML/MIN/1.73 M^2
EST. GFR  (NON AFRICAN AMERICAN): >60 ML/MIN/1.73 M^2
ESTIMATED AVG GLUCOSE: 105 MG/DL
GLUCOSE SERPL-MCNC: 79 MG/DL
HBA1C MFR BLD HPLC: 5.3 %
HCT VFR BLD AUTO: 42.4 %
HDLC SERPL-MCNC: 55 MG/DL
HGB BLD-MCNC: 13.8 G/DL
IMM GRANULOCYTES # BLD AUTO: 0.03 K/UL
IMM GRANULOCYTES NFR BLD AUTO: 0.4 %
LYMPHOCYTES # BLD AUTO: 2.1 K/UL
LYMPHOCYTES NFR BLD: 30.7 %
MCH RBC QN AUTO: 31 PG
MCHC RBC AUTO-ENTMCNC: 32.5 G/DL
MCV RBC AUTO: 95 FL
MONOCYTES # BLD AUTO: 0.5 K/UL
MONOCYTES NFR BLD: 7.5 %
NEUTROPHILS # BLD AUTO: 4.2 K/UL
NEUTROPHILS NFR BLD: 59.9 %
NRBC BLD-RTO: 0 /100 WBC
PLATELET # BLD AUTO: 261 K/UL
PMV BLD AUTO: 9.8 FL
POTASSIUM SERPL-SCNC: 4.3 MMOL/L
PROT SERPL-MCNC: 7.2 G/DL
RBC # BLD AUTO: 4.45 M/UL
SODIUM SERPL-SCNC: 142 MMOL/L
TSH SERPL DL<=0.005 MIU/L-ACNC: 1.77 UIU/ML
WBC # BLD AUTO: 6.96 K/UL

## 2018-03-02 PROCEDURE — 36415 COLL VENOUS BLD VENIPUNCTURE: CPT | Mod: PO

## 2018-03-02 PROCEDURE — 80053 COMPREHEN METABOLIC PANEL: CPT

## 2018-03-02 PROCEDURE — 83036 HEMOGLOBIN GLYCOSYLATED A1C: CPT

## 2018-03-02 PROCEDURE — 83718 ASSAY OF LIPOPROTEIN: CPT

## 2018-03-02 PROCEDURE — 84443 ASSAY THYROID STIM HORMONE: CPT

## 2018-03-02 PROCEDURE — 82465 ASSAY BLD/SERUM CHOLESTEROL: CPT

## 2018-03-02 PROCEDURE — 99999 PR PBB SHADOW E&M-EST. PATIENT-LVL III: CPT | Mod: PBBFAC,,, | Performed by: INTERNAL MEDICINE

## 2018-03-02 PROCEDURE — 85025 COMPLETE CBC W/AUTO DIFF WBC: CPT

## 2018-03-02 PROCEDURE — 99396 PREV VISIT EST AGE 40-64: CPT | Mod: S$GLB,,, | Performed by: INTERNAL MEDICINE

## 2018-03-02 RX ORDER — LEVOTHYROXINE SODIUM 75 UG/1
75 TABLET ORAL
Qty: 90 TABLET | Refills: 3 | Status: SHIPPED | OUTPATIENT
Start: 2018-03-02 | End: 2019-04-05 | Stop reason: SDUPTHER

## 2018-03-07 DIAGNOSIS — Z12.11 SCREEN FOR COLON CANCER: Primary | ICD-10-CM

## 2018-03-22 ENCOUNTER — ANESTHESIA EVENT (OUTPATIENT)
Dept: ENDOSCOPY | Facility: HOSPITAL | Age: 63
End: 2018-03-22
Payer: COMMERCIAL

## 2018-03-23 ENCOUNTER — SURGERY (OUTPATIENT)
Age: 63
End: 2018-03-23

## 2018-03-23 ENCOUNTER — HOSPITAL ENCOUNTER (OUTPATIENT)
Facility: HOSPITAL | Age: 63
Discharge: HOME | End: 2018-03-23
Attending: INTERNAL MEDICINE | Admitting: INTERNAL MEDICINE
Payer: COMMERCIAL

## 2018-03-23 ENCOUNTER — ANESTHESIA (OUTPATIENT)
Dept: ENDOSCOPY | Facility: HOSPITAL | Age: 63
End: 2018-03-23
Payer: COMMERCIAL

## 2018-03-23 VITALS
RESPIRATION RATE: 18 BRPM | SYSTOLIC BLOOD PRESSURE: 131 MMHG | TEMPERATURE: 98 F | DIASTOLIC BLOOD PRESSURE: 70 MMHG | BODY MASS INDEX: 26.96 KG/M2 | HEIGHT: 69 IN | WEIGHT: 182 LBS | OXYGEN SATURATION: 98 % | HEART RATE: 51 BPM

## 2018-03-23 DIAGNOSIS — Z12.11 SCREEN FOR COLON CANCER: ICD-10-CM

## 2018-03-23 PROCEDURE — D9220A PRA ANESTHESIA: Mod: CRNA,,, | Performed by: NURSE ANESTHETIST, CERTIFIED REGISTERED

## 2018-03-23 PROCEDURE — 88305 TISSUE EXAM BY PATHOLOGIST: CPT | Performed by: PATHOLOGY

## 2018-03-23 PROCEDURE — 27201012 HC FORCEPS, HOT/COLD, DISP: Performed by: INTERNAL MEDICINE

## 2018-03-23 PROCEDURE — 88305 TISSUE EXAM BY PATHOLOGIST: CPT | Mod: 26,,, | Performed by: PATHOLOGY

## 2018-03-23 PROCEDURE — 37000008 HC ANESTHESIA 1ST 15 MINUTES: Performed by: INTERNAL MEDICINE

## 2018-03-23 PROCEDURE — 37000009 HC ANESTHESIA EA ADD 15 MINS: Performed by: INTERNAL MEDICINE

## 2018-03-23 PROCEDURE — 45380 COLONOSCOPY AND BIOPSY: CPT | Performed by: INTERNAL MEDICINE

## 2018-03-23 PROCEDURE — 63600175 PHARM REV CODE 636 W HCPCS: Performed by: NURSE ANESTHETIST, CERTIFIED REGISTERED

## 2018-03-23 PROCEDURE — D9220A PRA ANESTHESIA: Mod: ANES,,, | Performed by: ANESTHESIOLOGY

## 2018-03-23 PROCEDURE — 25000003 PHARM REV CODE 250: Performed by: ANESTHESIOLOGY

## 2018-03-23 RX ORDER — SODIUM CHLORIDE 0.9 % (FLUSH) 0.9 %
3 SYRINGE (ML) INJECTION
Status: DISCONTINUED | OUTPATIENT
Start: 2018-03-23 | End: 2018-03-23 | Stop reason: HOSPADM

## 2018-03-23 RX ORDER — SODIUM CHLORIDE 9 MG/ML
INJECTION, SOLUTION INTRAVENOUS CONTINUOUS
Status: DISCONTINUED | OUTPATIENT
Start: 2018-03-23 | End: 2018-03-23 | Stop reason: HOSPADM

## 2018-03-23 RX ORDER — PROPOFOL 10 MG/ML
VIAL (ML) INTRAVENOUS
Status: COMPLETED
Start: 2018-03-23 | End: 2018-03-23

## 2018-03-23 RX ORDER — LIDOCAINE HYDROCHLORIDE 20 MG/ML
INJECTION, SOLUTION EPIDURAL; INFILTRATION; INTRACAUDAL; PERINEURAL
Status: DISCONTINUED
Start: 2018-03-23 | End: 2018-03-23 | Stop reason: HOSPADM

## 2018-03-23 RX ORDER — LIDOCAINE HYDROCHLORIDE 10 MG/ML
1 INJECTION, SOLUTION EPIDURAL; INFILTRATION; INTRACAUDAL; PERINEURAL ONCE
Status: DISCONTINUED | OUTPATIENT
Start: 2018-03-23 | End: 2018-03-23 | Stop reason: HOSPADM

## 2018-03-23 RX ORDER — LIDOCAINE HCL/PF 100 MG/5ML
SYRINGE (ML) INTRAVENOUS
Status: DISCONTINUED | OUTPATIENT
Start: 2018-03-23 | End: 2018-03-23

## 2018-03-23 RX ORDER — PROPOFOL 10 MG/ML
VIAL (ML) INTRAVENOUS
Status: DISCONTINUED | OUTPATIENT
Start: 2018-03-23 | End: 2018-03-23

## 2018-03-23 RX ADMIN — SODIUM CHLORIDE: 0.9 INJECTION, SOLUTION INTRAVENOUS at 08:03

## 2018-03-23 RX ADMIN — PROPOFOL 50 MG: 10 INJECTION, EMULSION INTRAVENOUS at 08:03

## 2018-03-23 RX ADMIN — LIDOCAINE HYDROCHLORIDE 60 MG: 20 INJECTION, SOLUTION INTRAVENOUS at 08:03

## 2018-03-23 RX ADMIN — PROPOFOL 30 MG: 10 INJECTION, EMULSION INTRAVENOUS at 08:03

## 2018-03-23 RX ADMIN — PROPOFOL 70 MG: 10 INJECTION, EMULSION INTRAVENOUS at 08:03

## 2018-03-23 RX ADMIN — PROPOFOL 40 MG: 10 INJECTION, EMULSION INTRAVENOUS at 08:03

## 2018-03-23 NOTE — ANESTHESIA POSTPROCEDURE EVALUATION
"Anesthesia Post Evaluation    Patient: Radha Fraga    Procedure(s) Performed: Procedure(s) (LRB):  COLONOSCOPY (N/A)    Final Anesthesia Type: MAC  Patient location during evaluation: PACU  Patient participation: Yes- Able to Participate  Level of consciousness: awake and alert, oriented and awake  Post-procedure vital signs: reviewed and stable  Pain management: adequate  Airway patency: patent  PONV status at discharge: No PONV  Anesthetic complications: no      Cardiovascular status: blood pressure returned to baseline, hemodynamically stable and stable  Respiratory status: unassisted and spontaneous ventilation  Hydration status: euvolemic  Follow-up not needed.        Visit Vitals  /70   Pulse (!) 51   Temp 36.7 °C (98 °F) (Oral)   Resp 18   Ht 5' 9" (1.753 m)   Wt 82.6 kg (182 lb)   SpO2 98%   Breastfeeding? No   BMI 26.88 kg/m²       Pain/Zahraa Score: Pain Assessment Performed: Yes (3/23/2018  9:53 AM)  Presence of Pain: denies (3/23/2018  9:53 AM)  Zahraa Score: 10 (3/23/2018  9:33 AM)      "

## 2018-03-23 NOTE — ANESTHESIA PREPROCEDURE EVALUATION
03/23/2018  Radha Fraga is a 62 y.o., female.    Anesthesia Evaluation    I have reviewed the Patient Summary Reports.    I have reviewed the Nursing Notes.   I have reviewed the Medications.     Review of Systems  Anesthesia Hx:  No problems with previous Anesthesia  Denies Family Hx of Anesthesia complications.   Denies Personal Hx of Anesthesia complications.   Social:  Non-Smoker    Hematology/Oncology:  Hematology Normal   Oncology Normal     EENT/Dental:EENT/Dental Normal   Cardiovascular:   Hypertension    Pulmonary:  Pulmonary Normal    Renal/:  Renal/ Normal     Hepatic/GI:  Hepatic/GI Normal    Musculoskeletal:  Musculoskeletal Normal    Neurological:   Headaches    Endocrine:   Hypothyroidism    Dermatological:  Skin Normal    Psych:  Psychiatric Normal           Physical Exam  General:  Well nourished    Airway/Jaw/Neck:  Airway Findings: Mouth Opening: Normal General Airway Assessment: Adult  Mallampati: II  TM Distance: 4 - 6 cm  Jaw/Neck Findings:      Dental:  Dental Findings: In tact   Chest/Lungs:  Chest/Lungs Findings: Clear to auscultation     Heart/Vascular:  Heart Findings: Rate: Normal             Anesthesia Plan  Type of Anesthesia, risks & benefits discussed:  Anesthesia Type:  general, MAC  Patient's Preference:   Intra-op Monitoring Plan: standard ASA monitors  Intra-op Monitoring Plan Comments:   Post Op Pain Control Plan: multimodal analgesia, IV/PO Opioids PRN and per primary service following discharge from PACU  Post Op Pain Control Plan Comments:   Induction:    Beta Blocker:  Patient is not currently on a Beta-Blocker (No further documentation required).       Informed Consent: Patient understands risks and agrees with Anesthesia plan.  Questions answered. Anesthesia consent signed with patient.  ASA Score: 2     Day of Surgery Review of History & Physical:    H&P  update referred to the provider.  H&P completed by Anesthesiologist.   Anesthesia Plan Notes: Npo after mn        Ready For Surgery From Anesthesia Perspective.

## 2018-03-23 NOTE — TRANSFER OF CARE
"Anesthesia Transfer of Care Note    Patient: Radha Fraga    Procedure(s) Performed: Procedure(s) (LRB):  COLONOSCOPY (N/A)    Patient location: GI    Anesthesia Type: general    Transport from OR: Transported from OR on room air with adequate spontaneous ventilation    Post pain: adequate analgesia    Post assessment: no apparent anesthetic complications    Post vital signs: stable    Level of consciousness: alert, awake and oriented    Nausea/Vomiting: no nausea/vomiting    Complications: none    Transfer of care protocol was followed      Last vitals:   Visit Vitals  BP (!) 141/73 (BP Location: Left arm, Patient Position: Lying)   Pulse 63   Temp 36.5 °C (97.7 °F)   Resp 12   Ht 5' 9" (1.753 m)   Wt 82.6 kg (182 lb)   SpO2 98%   Breastfeeding? No   BMI 26.88 kg/m²     "

## 2018-03-23 NOTE — H&P
"Gastroenterology    CC: hx polyps    HPI 62 y.o. female with hx polyps      Past Medical History:   Diagnosis Date    Asymptomatic cholelithiasis     Hypothyroidism     Lichen planus     Scoliosis     Unspecified essential hypertension 3/25/2014         Review of Systems  General ROS: negative for chills, fever or weight loss  Cardiovascular ROS: no chest pain or dyspnea on exertion    Physical Examination  BP (!) 159/91 (BP Location: Left arm, Patient Position: Lying)   Pulse 68   Temp 98 °F (36.7 °C) (Oral)   Resp 18   Ht 5' 9" (1.753 m)   Wt 82.6 kg (182 lb)   SpO2 99%   Breastfeeding? No   BMI 26.88 kg/m²   General appearance: alert, cooperative, no distress  HENT: Normocephalic, atraumatic, neck symmetrical, no nasal discharge   Eyes: conjunctivae/corneas clear, no icterus, EOM's intact  Lungs: resp non-labored  Heart: regular rate   Abdomen: soft, non-tender; bowel sounds normoactive; no organomegaly  Extremities: extremities symmetric; no clubbing, cyanosis, or edema  Neurologic: Alert and oriented X 3, normal strength, normal coordination and gait    Assessment:     Hx polyps    Plan:   colonoscopy      "

## 2018-03-23 NOTE — PROVATION PATIENT INSTRUCTIONS
Discharge Summary/Instructions after an Endoscopic Procedure  Patient Name: Radha Fraga  Patient MRN: [id number]  Patient YOB: 1955 Friday, March 23, 2018  Tom Gómez MD  RESTRICTIONS:  During your procedure today, you received medications for sedation.  These   medications may affect your judgment, balance and coordination.  Therefore,   for 24 hours, you have the following restrictions:   - DO NOT drive a car, operate machinery, make legal/financial decisions,   sign important papers or drink alcohol.    ACTIVITY:  The following day: return to full activity including work, except no heavy   lifting, straining or running for 3 days if polyps were removed.  DIET:  Eat and drink normally unless instructed otherwise.     TREATMENT FOR COMMON SIDE EFFECTS:  - Mild abdominal pain, nausea, belching, bloating or excessive gas:  rest,   eat lightly and use a heating pad.  - Sore Throat: treat with throat lozenges and/or gargle with warm salt   water.  - Because air was used during the procedure, expelling large amounts of air   from your rectum or belching is normal.  - If a bowel prep was taken, you may not have a bowel movement for 1-3 days.    This is normal.  SYMPTOMS TO WATCH FOR AND REPORT TO YOUR PHYSICIAN:  1. Abdominal pain or bloating, other than gas cramps.  2. Chest pain.  3. Back pain.  4. Signs of infection such as: chills or fever occurring within 24 hours   after the procedure.  5. Rectal bleeding, which would show as bright red, maroon, or black stools.   (A tablespoon of blood from the rectum is not serious, especially if   hemorrhoids are present.)  6. Vomiting.  7. Weakness or dizziness.  GO DIRECTLY TO THE NEAREST EMERGENCY ROOM IF YOU HAVE ANY OF THE FOLLOWING:      Difficulty breathing              Chills and/or fever over 101 F   Persistent vomiting and/or vomiting blood   Severe abdominal pain   Severe chest pain   Black, tarry stools   Bleeding- more than one tablespoon   Any other  symptom or condition that you feel may need urgent attention  Your doctor recommends these additional instructions:  If any biopsies were taken, your doctors clinic will contact you in 1 to 2   weeks with any results.  - Discharge patient to home.   - Return to normal activities tomorrow.   - Resume previous diet today.   - Await pathology results.   - Repeat colonoscopy in 5 years for surveillance.   - Return to primary care physician in 4 weeks.   - The findings and recommendations were discussed with the patient and their   family.  For questions, problems or results please call your physician - Tom Gómez MD at Work:  [phone].  [facility] can be reached at [phone]     IF A COMPLICATION OR EMERGENCY SITUATION ARISES AND YOU ARE UNABLE TO REACH   YOUR PHYSICIAN - GO DIRECTLY TO THE EMERGENCY ROOM.  MD Tom Reynolds MD  3/23/2018 9:21:28 AM  This report has been verified and signed electronically.

## 2018-06-01 ENCOUNTER — OFFICE VISIT (OUTPATIENT)
Dept: ORTHOPEDICS | Facility: CLINIC | Age: 63
End: 2018-06-01
Payer: COMMERCIAL

## 2018-06-01 ENCOUNTER — HOSPITAL ENCOUNTER (OUTPATIENT)
Dept: RADIOLOGY | Facility: HOSPITAL | Age: 63
Discharge: HOME | End: 2018-06-01
Attending: PHYSICIAN ASSISTANT
Payer: COMMERCIAL

## 2018-06-01 DIAGNOSIS — M25.561 ACUTE PAIN OF RIGHT KNEE: ICD-10-CM

## 2018-06-01 DIAGNOSIS — M25.561 ACUTE PAIN OF RIGHT KNEE: Primary | ICD-10-CM

## 2018-06-01 PROCEDURE — 73562 X-RAY EXAM OF KNEE 3: CPT | Mod: TC,RT

## 2018-06-01 PROCEDURE — 99999 PR PBB SHADOW E&M-EST. PATIENT-LVL I: CPT | Mod: PBBFAC,,, | Performed by: PHYSICIAN ASSISTANT

## 2018-06-01 PROCEDURE — 73560 X-RAY EXAM OF KNEE 1 OR 2: CPT | Mod: TC,LT

## 2018-06-01 PROCEDURE — 99203 OFFICE O/P NEW LOW 30 MIN: CPT | Mod: S$GLB,,, | Performed by: PHYSICIAN ASSISTANT

## 2018-06-01 PROCEDURE — 73560 X-RAY EXAM OF KNEE 1 OR 2: CPT | Mod: 26,XS,LT, | Performed by: RADIOLOGY

## 2018-06-01 PROCEDURE — 73562 X-RAY EXAM OF KNEE 3: CPT | Mod: 26,RT,, | Performed by: RADIOLOGY

## 2018-06-01 RX ORDER — MELOXICAM 15 MG/1
15 TABLET ORAL DAILY
Qty: 30 TABLET | Refills: 0 | Status: SHIPPED | OUTPATIENT
Start: 2018-06-01 | End: 2018-07-01

## 2018-06-04 NOTE — PROGRESS NOTES
Subjective:      Patient ID: Radha Fraga is a 62 y.o. female.    Chief Complaint: No chief complaint on file.    HPI  Patient is a 62 year old female who presents to clinic with chief complaint of right knee pain since starting to exercises. Pain is increased with increased activity. Patient stated that it feels like a stabbing. Associated symptoms include intermittent swelling. Advil and rest helps. Symptoms increased at night.     Review of Systems   Constitution: Negative for chills and fever.   Cardiovascular: Negative for chest pain.   Respiratory: Negative for cough and shortness of breath.    Skin: Negative for color change, dry skin, itching, nail changes, poor wound healing and rash.   Musculoskeletal:        Right knee pain   Neurological: Negative for dizziness.   Psychiatric/Behavioral: Negative for altered mental status. The patient is not nervous/anxious.    All other systems reviewed and are negative.        Objective:            General    Constitutional: She is oriented to person, place, and time. She appears well-developed and well-nourished. No distress.   HENT:   Head: Atraumatic.   Eyes: Conjunctivae are normal.   Cardiovascular: Normal rate.    Pulmonary/Chest: Effort normal.   Neurological: She is alert and oriented to person, place, and time.   Psychiatric: She has a normal mood and affect. Her behavior is normal.           Right Knee Exam     Range of Motion   The patient has normal right knee ROM.    Tests   Meniscus   Heather:  Medial - negative Lateral - positive  Ligament Examination Lachman: normal (-1 to 2mm) PCL-Posterior Drawer: normal (0 to 2mm)     MCL - Valgus: normal (0 to 2mm)  LCL - Varus: normal    Other   Sensation: normal    Muscle Strength   Right Lower Extremity   Quadriceps:  5/5   Hamstrin/5         RADS: no fracture or dislocation.       Assessment:       Encounter Diagnosis   Name Primary?    Acute pain of right knee Yes          Plan:       Discussed  treatment options with the patient. She will rest ice and use NSAID x 2 weeks. If continued pain consider MRI vs. PT for the knee.

## 2018-08-18 DIAGNOSIS — H53.9 UNSPECIFIED VISUAL DISTURBANCE: ICD-10-CM

## 2018-08-18 RX ORDER — ASPIRIN 81 MG/1
81 TABLET ORAL DAILY
Qty: 100 TABLET
Start: 2018-08-18 | End: 2020-08-28 | Stop reason: ALTCHOICE

## 2018-09-12 ENCOUNTER — LAB VISIT (OUTPATIENT)
Dept: LAB | Facility: HOSPITAL | Age: 63
End: 2018-09-12
Payer: MEDICARE

## 2018-09-12 ENCOUNTER — OFFICE VISIT (OUTPATIENT)
Dept: FAMILY MEDICINE | Facility: CLINIC | Age: 63
End: 2018-09-12
Payer: MEDICARE

## 2018-09-12 VITALS
WEIGHT: 181 LBS | DIASTOLIC BLOOD PRESSURE: 92 MMHG | TEMPERATURE: 98 F | HEART RATE: 64 BPM | BODY MASS INDEX: 26.81 KG/M2 | HEIGHT: 69 IN | OXYGEN SATURATION: 98 % | SYSTOLIC BLOOD PRESSURE: 144 MMHG

## 2018-09-12 DIAGNOSIS — E03.9 HYPOTHYROIDISM, UNSPECIFIED TYPE: ICD-10-CM

## 2018-09-12 DIAGNOSIS — M89.9 DISORDER OF BONE AND CARTILAGE: ICD-10-CM

## 2018-09-12 DIAGNOSIS — I10 ESSENTIAL HYPERTENSION: ICD-10-CM

## 2018-09-12 DIAGNOSIS — Z23 NEED FOR VACCINATION AGAINST STREPTOCOCCUS PNEUMONIAE USING PNEUMOCOCCAL CONJUGATE VACCINE 13: ICD-10-CM

## 2018-09-12 DIAGNOSIS — Z12.39 SCREENING FOR MALIGNANT NEOPLASM OF BREAST: ICD-10-CM

## 2018-09-12 DIAGNOSIS — I10 ESSENTIAL HYPERTENSION: Primary | ICD-10-CM

## 2018-09-12 DIAGNOSIS — M94.9 DISORDER OF BONE AND CARTILAGE: ICD-10-CM

## 2018-09-12 LAB
ALBUMIN SERPL BCP-MCNC: 4.1 G/DL
ALP SERPL-CCNC: 71 U/L
ALT SERPL W/O P-5'-P-CCNC: 19 U/L
ANION GAP SERPL CALC-SCNC: 7 MMOL/L
AST SERPL-CCNC: 25 U/L
BASOPHILS # BLD AUTO: 0.02 K/UL
BASOPHILS NFR BLD: 0.3 %
BILIRUB SERPL-MCNC: 0.8 MG/DL
BUN SERPL-MCNC: 16 MG/DL
CALCIUM SERPL-MCNC: 9.8 MG/DL
CHLORIDE SERPL-SCNC: 105 MMOL/L
CO2 SERPL-SCNC: 27 MMOL/L
CREAT SERPL-MCNC: 0.9 MG/DL
DIFFERENTIAL METHOD: ABNORMAL
EOSINOPHIL # BLD AUTO: 0.2 K/UL
EOSINOPHIL NFR BLD: 2.4 %
ERYTHROCYTE [DISTWIDTH] IN BLOOD BY AUTOMATED COUNT: 13 %
EST. GFR  (AFRICAN AMERICAN): >60 ML/MIN/1.73 M^2
EST. GFR  (NON AFRICAN AMERICAN): >60 ML/MIN/1.73 M^2
GLUCOSE SERPL-MCNC: 80 MG/DL
HCT VFR BLD AUTO: 46 %
HGB BLD-MCNC: 14.3 G/DL
IMM GRANULOCYTES # BLD AUTO: 0.01 K/UL
IMM GRANULOCYTES NFR BLD AUTO: 0.2 %
LYMPHOCYTES # BLD AUTO: 2.4 K/UL
LYMPHOCYTES NFR BLD: 38.9 %
MCH RBC QN AUTO: 30.9 PG
MCHC RBC AUTO-ENTMCNC: 31.1 G/DL
MCV RBC AUTO: 99 FL
MONOCYTES # BLD AUTO: 0.5 K/UL
MONOCYTES NFR BLD: 7.7 %
NEUTROPHILS # BLD AUTO: 3.1 K/UL
NEUTROPHILS NFR BLD: 50.5 %
NRBC BLD-RTO: 0 /100 WBC
PLATELET # BLD AUTO: 253 K/UL
PMV BLD AUTO: 10.1 FL
POTASSIUM SERPL-SCNC: 4.5 MMOL/L
PROT SERPL-MCNC: 7.4 G/DL
RBC # BLD AUTO: 4.63 M/UL
SODIUM SERPL-SCNC: 139 MMOL/L
TSH SERPL DL<=0.005 MIU/L-ACNC: 2.78 UIU/ML
WBC # BLD AUTO: 6.2 K/UL

## 2018-09-12 PROCEDURE — 3008F BODY MASS INDEX DOCD: CPT | Mod: CPTII,,, | Performed by: NURSE PRACTITIONER

## 2018-09-12 PROCEDURE — 99999 PR PBB SHADOW E&M-EST. PATIENT-LVL IV: CPT | Mod: PBBFAC,,, | Performed by: NURSE PRACTITIONER

## 2018-09-12 PROCEDURE — 90670 PCV13 VACCINE IM: CPT | Mod: PBBFAC,PO

## 2018-09-12 PROCEDURE — 3077F SYST BP >= 140 MM HG: CPT | Mod: CPTII,,, | Performed by: NURSE PRACTITIONER

## 2018-09-12 PROCEDURE — 85025 COMPLETE CBC W/AUTO DIFF WBC: CPT

## 2018-09-12 PROCEDURE — 84443 ASSAY THYROID STIM HORMONE: CPT

## 2018-09-12 PROCEDURE — 80053 COMPREHEN METABOLIC PANEL: CPT

## 2018-09-12 PROCEDURE — 36415 COLL VENOUS BLD VENIPUNCTURE: CPT | Mod: PO

## 2018-09-12 PROCEDURE — 99214 OFFICE O/P EST MOD 30 MIN: CPT | Mod: PBBFAC,PO,25 | Performed by: NURSE PRACTITIONER

## 2018-09-12 PROCEDURE — 99214 OFFICE O/P EST MOD 30 MIN: CPT | Mod: S$PBB,,, | Performed by: NURSE PRACTITIONER

## 2018-09-12 PROCEDURE — 3080F DIAST BP >= 90 MM HG: CPT | Mod: CPTII,,, | Performed by: NURSE PRACTITIONER

## 2018-09-12 PROCEDURE — 1101F PT FALLS ASSESS-DOCD LE1/YR: CPT | Mod: CPTII,,, | Performed by: NURSE PRACTITIONER

## 2018-09-12 RX ORDER — LOSARTAN POTASSIUM 25 MG/1
25 TABLET ORAL DAILY
Qty: 30 TABLET | Refills: 2 | Status: SHIPPED | OUTPATIENT
Start: 2018-09-12 | End: 2018-12-19 | Stop reason: SDUPTHER

## 2018-09-12 NOTE — PROGRESS NOTES
History of Present Illness   Radha Fraga is a 63 y.o. woman with medical history as listed below who presents today for evaluation of elevated blood pressure. She has [place] the health club and has been monitoring her blood pressure. She reports is has been running high 140-170/ mmHg. She is asymptomatic with elevation in blood pressure. She reports that last night she was concerned and found an old prescription for nadolol and took one tablet. She states that she was prescribed blood pressure medication years ago, but was able to stop as her BP was controlled with lifestyle modifications. She is also due for blood work and other healthcare maintenance. She has no additional complaints and is otherwise healthy on today's visit.      Past Medical History:   Diagnosis Date    Asymptomatic cholelithiasis     Hypothyroidism     Lichen planus     Scoliosis     Unspecified essential hypertension 3/25/2014       Past Surgical History:   Procedure Laterality Date    COLONOSCOPY  2006    normal.    COLONOSCOPY N/A 3/23/2018    Procedure: COLONOSCOPY;  Surgeon: Tom Gómez MD;  Location: [facility];  Service: Endoscopy;  Laterality: N/A;    COLONOSCOPY N/A 3/23/2018    [facility]    COLONOSCOPY N/A 1/11/2013    [facility] at [facility]    NOSE SURGERY      TONSILLECTOMY      TUBAL LIGATION         Social History     Socioeconomic History    Marital status:      Spouse name: None    Number of children: None    Years of education: None    Highest education level: None   Social Needs    Financial resource strain: None    Food insecurity - worry: None    Food insecurity - inability: None    Transportation needs - medical: None    Transportation needs - non-medical: None   Occupational History    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 Not Asked    Breast-feeding Not Asked   Social History Narrative    None       Family History   Adopted: Yes   Problem Relation Age of Onset    No Known Problems Daughter     Hypertension Son     No Known Problems Son     Skin cancer Neg Hx        Review of Systems  Review of Systems   Constitutional: Negative for malaise/fatigue.   Eyes: Negative for blurred vision and double vision.   Respiratory: Negative for shortness of breath.    Cardiovascular: Negative for chest pain, palpitations, claudication and leg swelling.   Neurological: Negative for dizziness, loss of consciousness and headaches.     A complete review of systems was otherwise negative.    Physical Exam  BP (!) 144/92 (BP Location: Right arm, Patient Position: Sitting, BP Method: Medium (Manual))   Pulse 64   Temp 98 °F (36.7 °C) (Oral)   Ht 5' 9" (1.753 m)   Wt 82.1 kg (181 lb)   SpO2 98%   BMI 26.73 kg/m²   General appearance: alert, appears stated age, cooperative and no distress  Eyes: negative findings: pupils equal, round, reactive to light and accomodation  Neck: no carotid bruit, no JVD, supple, symmetrical, trachea midline and thyroid not enlarged, symmetric, no tenderness/mass/nodules  Lungs: clear to auscultation bilaterally  Heart: regular rate and rhythm, S1, S2 normal, no murmur, click, rub or gallop  Extremities: extremities normal, atraumatic, no cyanosis or edema  Pulses: 2+ and symmetric  Skin: Skin color, texture, turgor normal. No rashes or lesions  Neurologic: Grossly normal    Assessment/Plan  Essential hypertension  She was previously prescribed Losartan and did well. Resume this daily.  Continue exercise with diet modifications, limit sodium " intake, DASH diet.  Labs as listed below.  Return in two weeks for nurse BP visit.  -     losartan (COZAAR) 25 MG tablet; Take 1 tablet (25 mg total) by mouth once daily.  Dispense: 30 tablet; Refill: 2  -     CBC auto differential; Future; Expected date: 09/12/2018  -     Comprehensive metabolic panel; Future; Expected date: 09/12/2018    Hypothyroidism, unspecified type  The current medical regimen is effective;  continue present plan and medications.  TFTs today.  -     TSH; Future; Expected date: 09/12/2018    Disorder of bone and cartilage  Scheduled today.  -     DXA Bone Density Spine And Hip; Future; Expected date: 09/12/2018  -     CBC auto differential; Future; Expected date: 09/12/2018    Screening for malignant neoplasm of breast  Scheduled today.  -     Mammo Digital Screening Bilateral With CAD; Future; Expected date: 09/12/2018    Need for vaccination against Streptococcus pneumoniae using pneumococcal conjugate vaccine 13  Administered today.  -     (In Office Administered) Pneumococcal Conjugate Vaccine (13 Valent) (IM)    She has verbalized understanding and is in agreement with plan of care.    Follow-up in about 2 weeks (around 9/26/2018) for nurse BP visit.

## 2018-09-12 NOTE — PATIENT INSTRUCTIONS

## 2018-09-27 ENCOUNTER — HOSPITAL ENCOUNTER (OUTPATIENT)
Dept: RADIOLOGY | Facility: CLINIC | Age: 63
Discharge: HOME | End: 2018-09-27
Attending: NURSE PRACTITIONER
Payer: MEDICARE

## 2018-09-27 ENCOUNTER — HOSPITAL ENCOUNTER (OUTPATIENT)
Dept: RADIOLOGY | Facility: HOSPITAL | Age: 63
Discharge: HOME | End: 2018-09-27
Attending: NURSE PRACTITIONER
Payer: MEDICARE

## 2018-09-27 ENCOUNTER — CLINICAL SUPPORT (OUTPATIENT)
Dept: FAMILY MEDICINE | Facility: CLINIC | Age: 63
End: 2018-09-27
Payer: MEDICARE

## 2018-09-27 VITALS — HEART RATE: 74 BPM | DIASTOLIC BLOOD PRESSURE: 76 MMHG | OXYGEN SATURATION: 98 % | SYSTOLIC BLOOD PRESSURE: 124 MMHG

## 2018-09-27 DIAGNOSIS — M94.9 DISORDER OF BONE AND CARTILAGE: ICD-10-CM

## 2018-09-27 DIAGNOSIS — I10 ESSENTIAL HYPERTENSION: Primary | ICD-10-CM

## 2018-09-27 DIAGNOSIS — M89.9 DISORDER OF BONE AND CARTILAGE: ICD-10-CM

## 2018-09-27 DIAGNOSIS — Z12.39 SCREENING FOR MALIGNANT NEOPLASM OF BREAST: ICD-10-CM

## 2018-09-27 PROCEDURE — 77067 SCR MAMMO BI INCL CAD: CPT | Mod: 26,,, | Performed by: RADIOLOGY

## 2018-09-27 PROCEDURE — 77067 SCR MAMMO BI INCL CAD: CPT | Mod: TC,PO

## 2018-09-27 PROCEDURE — 99212 OFFICE O/P EST SF 10 MIN: CPT | Mod: PBBFAC,PO,25

## 2018-09-27 PROCEDURE — 99499 UNLISTED E&M SERVICE: CPT | Mod: S$PBB,,, | Performed by: INTERNAL MEDICINE

## 2018-09-27 PROCEDURE — 77080 DXA BONE DENSITY AXIAL: CPT | Mod: TC,PO

## 2018-09-27 PROCEDURE — 77063 BREAST TOMOSYNTHESIS BI: CPT | Mod: 26,,, | Performed by: RADIOLOGY

## 2018-09-27 PROCEDURE — 99999 PR PBB SHADOW E&M-EST. PATIENT-LVL II: CPT | Mod: PBBFAC,,,

## 2018-09-27 PROCEDURE — 77080 DXA BONE DENSITY AXIAL: CPT | Mod: 26,,, | Performed by: INTERNAL MEDICINE

## 2018-09-27 NOTE — PROGRESS NOTES
Radha Fraga 63 y.o. female is here today for Blood Pressure check.   History of HTN yes.    Review of patient's allergies indicates:   Allergen Reactions    Iodinated contrast- oral and iv dye      Creatinine   Date Value Ref Range Status   09/12/2018 0.9 0.5 - 1.4 mg/dL Final     Sodium   Date Value Ref Range Status   09/12/2018 139 136 - 145 mmol/L Final     Potassium   Date Value Ref Range Status   09/12/2018 4.5 3.5 - 5.1 mmol/L Final   ]  Patient verifies taking blood pressure medications on a regular basis at the same time of the day.     Current Outpatient Medications:     aspirin (ECOTRIN) 81 MG EC tablet, Take 1 tablet (81 mg total) by mouth once daily., Disp: 100 tablet, Rfl: prn    levothyroxine (SYNTHROID) 75 MCG tablet, Take 1 tablet (75 mcg total) by mouth before breakfast., Disp: 90 tablet, Rfl: 3    losartan (COZAAR) 25 MG tablet, Take 1 tablet (25 mg total) by mouth once daily., Disp: 30 tablet, Rfl: 2  Does patient have record of home blood pressure readings no..   Last dose of blood pressure medication was taken at 7:00 this am.  Patient is asymptomatic.   Complains of none.    Vitals:    09/27/18 0920   BP: 124/76   BP Location: Right arm   Patient Position: Sitting   BP Method: Medium (Manual)   Pulse: 74   SpO2: 98%         Dr. Meléndez  informed of nurse visit.

## 2018-10-12 ENCOUNTER — TELEPHONE (OUTPATIENT)
Dept: FAMILY MEDICINE | Facility: CLINIC | Age: 63
End: 2018-10-12

## 2018-10-12 NOTE — TELEPHONE ENCOUNTER
Bone density shows osteopenia, the precursor to osteoporosis  Recommendations:    1.  Calcium 4541-2254 mg daily and vitamin D 800-1000 units daily, adequate exercise.    2.  Repeat BMD in 2 years    Thanks!  TONI DouglasC

## 2018-12-19 DIAGNOSIS — I10 ESSENTIAL HYPERTENSION: ICD-10-CM

## 2018-12-19 RX ORDER — LOSARTAN POTASSIUM 25 MG/1
25 TABLET ORAL DAILY
Qty: 30 TABLET | Refills: 2 | Status: SHIPPED | OUTPATIENT
Start: 2018-12-19 | End: 2019-04-05 | Stop reason: SDUPTHER

## 2019-04-05 ENCOUNTER — OFFICE VISIT (OUTPATIENT)
Dept: FAMILY MEDICINE | Facility: CLINIC | Age: 64
End: 2019-04-05
Payer: MEDICARE

## 2019-04-05 VITALS
DIASTOLIC BLOOD PRESSURE: 76 MMHG | HEIGHT: 69 IN | BODY MASS INDEX: 26.77 KG/M2 | TEMPERATURE: 98 F | HEART RATE: 78 BPM | SYSTOLIC BLOOD PRESSURE: 122 MMHG | WEIGHT: 180.75 LBS | OXYGEN SATURATION: 98 %

## 2019-04-05 DIAGNOSIS — E03.8 HYPOTHYROIDISM DUE TO HASHIMOTO'S THYROIDITIS: Chronic | ICD-10-CM

## 2019-04-05 DIAGNOSIS — I10 ESSENTIAL HYPERTENSION: ICD-10-CM

## 2019-04-05 DIAGNOSIS — E06.3 HYPOTHYROIDISM DUE TO HASHIMOTO'S THYROIDITIS: Chronic | ICD-10-CM

## 2019-04-05 PROBLEM — Z12.11 SCREEN FOR COLON CANCER: Status: RESOLVED | Noted: 2018-03-23 | Resolved: 2019-04-05

## 2019-04-05 PROCEDURE — 99499 RISK ADDL DX/OHS AUDIT: ICD-10-PCS | Mod: S$GLB,,, | Performed by: INTERNAL MEDICINE

## 2019-04-05 PROCEDURE — 99999 PR PBB SHADOW E&M-EST. PATIENT-LVL III: CPT | Mod: PBBFAC,,, | Performed by: INTERNAL MEDICINE

## 2019-04-05 PROCEDURE — 99214 PR OFFICE/OUTPT VISIT, EST, LEVL IV, 30-39 MIN: ICD-10-PCS | Mod: S$GLB,,, | Performed by: INTERNAL MEDICINE

## 2019-04-05 PROCEDURE — 1101F PT FALLS ASSESS-DOCD LE1/YR: CPT | Mod: CPTII,S$GLB,, | Performed by: INTERNAL MEDICINE

## 2019-04-05 PROCEDURE — 99999 PR PBB SHADOW E&M-EST. PATIENT-LVL III: ICD-10-PCS | Mod: PBBFAC,,, | Performed by: INTERNAL MEDICINE

## 2019-04-05 PROCEDURE — 3078F PR MOST RECENT DIASTOLIC BLOOD PRESSURE < 80 MM HG: ICD-10-PCS | Mod: CPTII,S$GLB,, | Performed by: INTERNAL MEDICINE

## 2019-04-05 PROCEDURE — 3008F PR BODY MASS INDEX (BMI) DOCUMENTED: ICD-10-PCS | Mod: CPTII,S$GLB,, | Performed by: INTERNAL MEDICINE

## 2019-04-05 PROCEDURE — 3078F DIAST BP <80 MM HG: CPT | Mod: CPTII,S$GLB,, | Performed by: INTERNAL MEDICINE

## 2019-04-05 PROCEDURE — 3074F SYST BP LT 130 MM HG: CPT | Mod: CPTII,S$GLB,, | Performed by: INTERNAL MEDICINE

## 2019-04-05 PROCEDURE — 1101F PR PT FALLS ASSESS DOC 0-1 FALLS W/OUT INJ PAST YR: ICD-10-PCS | Mod: CPTII,S$GLB,, | Performed by: INTERNAL MEDICINE

## 2019-04-05 PROCEDURE — 3074F PR MOST RECENT SYSTOLIC BLOOD PRESSURE < 130 MM HG: ICD-10-PCS | Mod: CPTII,S$GLB,, | Performed by: INTERNAL MEDICINE

## 2019-04-05 PROCEDURE — 99214 OFFICE O/P EST MOD 30 MIN: CPT | Mod: S$GLB,,, | Performed by: INTERNAL MEDICINE

## 2019-04-05 PROCEDURE — 99499 UNLISTED E&M SERVICE: CPT | Mod: S$GLB,,, | Performed by: INTERNAL MEDICINE

## 2019-04-05 PROCEDURE — 3008F BODY MASS INDEX DOCD: CPT | Mod: CPTII,S$GLB,, | Performed by: INTERNAL MEDICINE

## 2019-04-05 RX ORDER — LOSARTAN POTASSIUM 25 MG/1
25 TABLET ORAL DAILY
Qty: 90 TABLET | Refills: 3 | Status: SHIPPED | OUTPATIENT
Start: 2019-04-05 | End: 2020-04-20

## 2019-04-05 RX ORDER — LEVOTHYROXINE SODIUM 75 UG/1
75 TABLET ORAL
Qty: 90 TABLET | Refills: 3 | Status: SHIPPED | OUTPATIENT
Start: 2019-04-05 | End: 2020-04-20

## 2019-04-05 NOTE — PROGRESS NOTES
This note was created by combination of typed  and Dragon dictation.  Transcription errors may be present.  If there are any questions, please contact me.    Assessment / Plan:   Essential hypertension  -stable on current regimen, denies obvious side effects of the medication.  I have asked her to start monitoring her pressures outside the office.  Refill to pharmacy.  -     losartan (COZAAR) 25 MG tablet; Take 1 tablet (25 mg total) by mouth once daily.  Dispense: 90 tablet; Refill: 3    Hypothyroidism due to Hashimoto's thyroiditis  -last check in the fall stable on current dose.  Has been taking it same dose longstanding.  Cautioned her not to take with calcium as that can affect absorption.  -     levothyroxine (SYNTHROID) 75 MCG tablet; Take 1 tablet (75 mcg total) by mouth before breakfast.  Dispense: 90 tablet; Refill: 3    Medications Discontinued During This Encounter   Medication Reason    losartan (COZAAR) 25 MG tablet Reorder    levothyroxine (SYNTHROID) 75 MCG tablet Reorder       meds sent this encounter:  Medications Ordered This Encounter   Medications    levothyroxine (SYNTHROID) 75 MCG tablet     Sig: Take 1 tablet (75 mcg total) by mouth before breakfast.     Dispense:  90 tablet     Refill:  3    losartan (COZAAR) 25 MG tablet     Sig: Take 1 tablet (25 mg total) by mouth once daily.     Dispense:  90 tablet     Refill:  3       Follow Up: No follow-ups on file.  Plan for physical exam 1 year      Subjective:     Chief Complaint   Patient presents with    Medication Refill       MELVA Garcia is a 63 y.o. female, last appointment with this clinic was Visit date not found.    No LMP recorded. Patient is postmenopausal.    She saw nurse practitioner back in September.  Hypertension uncontrolled, added back losartan.  TSH done in September good on current dose.    Denies SE of the medication.  No dizziness/chest pain/pedal edema    Diet - weakness for sweets and junk food. Not a salt  heavy diet. Improving her vegetable intake.   Physical activity - needs more. Trying to improve.    Started MVI and calcium supplement.  ? Dose. Is taking Calcium with the thyroid - advised to take different times b/c affects thryoid absorption    Patient Care Team:  Chicho Meléndez MD as PCP - General (Internal Medicine)  Edgard Multani II, MD as Consulting Physician (Endocrinology)  Tatianna Latham MD as Consulting Physician (Physical Medicine and Rehabilitation)  Josh Daily MD (Inactive) as Obstetrician (Obstetrics)    Patient Active Problem List    Diagnosis Date Noted    Chronic midline low back pain without sciatica 08/10/2016     8/10/2016 MRI L spine: 1. Significant degenerative disc disease at L5-S1 resulting in mild bilateral neuroforaminal narrowing.  Otherwise mild multilevel lumbar spondylosis. 2. Cholelithiasis.  MRI of T spine showed: 1. Mild degenerative changes of the thoracic spine. No spinal canal stenosis or neuroforaminal narrowing.  Spinal cord signal and morphology are maintained.2.  Cholelithiasis. 3.  Subcentimeter T2 hyperintense lesion at the liver dome incompletely characterized.      Hypothyroidism due to Hashimoto's thyroiditis 06/16/2015    Migraine syndrome 05/19/2015     Symptoms - tightness in the back of the neck and scotomas - really more opthalmic migraine.  5/18/2015 MRI brain neg  5/18/2015 carotid US negative.      Essential hypertension 03/25/2014    Tubular adenoma of colon 2013 and 2018 03/25/2014 1/11/2013 colonoscopy with sigmoid hyperplastic polyp with suggestion of focal early adenomatous change; melanosis coli.  3/23/2018 colonoscopy rectal sessile tubular adenoma      Alopecia areata 03/25/2014    Asymptomatic cholelithiasis 10/13/2012    Lichen planus 10/13/2012    Scoliosis 10/13/2012       PAST MEDICAL HISTORY:  Past Medical History:   Diagnosis Date    Asymptomatic cholelithiasis     Hypothyroidism     Lichen planus     Scoliosis      Unspecified essential hypertension 3/25/2014       PAST SURGICAL HISTORY:  Past Surgical History:   Procedure Laterality Date    COLONOSCOPY  2006    normal.   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   Social Needs    Financial resource strain: Not on file    Food insecurity:     Worry: Not on file     Inability: Not on file    Transportation needs:     Medical: Not on file     Non-medical: Not on file   Tobacco Use    Smoking status: Former Smoker     Last attempt to quit: 10/12/2004     Years since quittin.4    Smokeless tobacco: Never Used   Substance and Sexual Activity    Alcohol use: Yes     Comment: occassionally    Drug use: No    Sexual activity: Yes   Lifestyle    Physical activity:     Days per week: Not on file     Minutes per session: Not on file    Stress: Not on file   Relationships    Social connections:     Talks on phone: Not on file     Gets together: Not on file     Attends [employer] service: Not on file     Active member of club or organization: Not on file     Attends meetings of clubs or organizations: Not on file     Relationship status: Not on file   Other Topics Concern    Are you pregnant or think you may be? Not Asked    Breast-feeding Not Asked   Social History Narrative    Not on file        ALLERGIES AND MEDICATIONS: updated and reviewed.  Review of patient's allergies indicates:   Allergen Reactions    Iodinated contrast- oral and iv dye      Current Outpatient Medications  "  Medication Sig Dispense Refill    aspirin (ECOTRIN) 81 MG EC tablet Take 1 tablet (81 mg total) by mouth once daily. 100 tablet prn    levothyroxine (SYNTHROID) 75 MCG tablet Take 1 tablet (75 mcg total) by mouth before breakfast. 90 tablet 3    losartan (COZAAR) 25 MG tablet Take 1 tablet (25 mg total) by mouth once daily. 30 tablet 2     No current facility-administered medications for this visit.        Review of Systems   Constitutional: Negative for chills and fever.   Cardiovascular: Negative for chest pain and palpitations.   Gastrointestinal: Negative for abdominal pain, blood in stool and heartburn.   Genitourinary: Negative for dysuria.   Neurological: Negative for dizziness and headaches.       Objective:   Physical Exam   Vitals:    04/05/19 1429   BP: 122/76   Pulse: 78   Temp: 98.3 °F (36.8 °C)   SpO2: 98%   Weight: 82 kg (180 lb 12.4 oz)   Height: 5' 9" (1.753 m)    Body mass index is 26.7 kg/m².  Weight: 82 kg (180 lb 12.4 oz)   Height: 5' 9" (175.3 cm)     Physical Exam   Constitutional: She is oriented to person, place, and time. She appears well-developed and well-nourished. No distress.   Eyes: EOM are normal.   Cardiovascular: Normal rate, regular rhythm and normal heart sounds.   No murmur heard.  Pulmonary/Chest: Effort normal and breath sounds normal.   Musculoskeletal: Normal range of motion. She exhibits no edema.   Lymphadenopathy:     She has no cervical adenopathy.   Neurological: She is alert and oriented to person, place, and time. Coordination normal.   Skin: Skin is warm and dry.   Psychiatric: She has a normal mood and affect. Her behavior is normal. Thought content normal.     "

## 2019-08-15 ENCOUNTER — OFFICE VISIT (OUTPATIENT)
Dept: FAMILY MEDICINE | Facility: CLINIC | Age: 64
End: 2019-08-15
Payer: MEDICARE

## 2019-08-15 VITALS
HEART RATE: 73 BPM | OXYGEN SATURATION: 98 % | BODY MASS INDEX: 27.11 KG/M2 | SYSTOLIC BLOOD PRESSURE: 120 MMHG | TEMPERATURE: 98 F | WEIGHT: 183 LBS | DIASTOLIC BLOOD PRESSURE: 86 MMHG | HEIGHT: 69 IN

## 2019-08-15 DIAGNOSIS — J02.9 PHARYNGITIS, UNSPECIFIED ETIOLOGY: Primary | ICD-10-CM

## 2019-08-15 LAB
CTP QC/QA: YES
S PYO RRNA THROAT QL PROBE: NEGATIVE

## 2019-08-15 PROCEDURE — 99999 PR PBB SHADOW E&M-EST. PATIENT-LVL III: ICD-10-PCS | Mod: PBBFAC,,, | Performed by: INTERNAL MEDICINE

## 2019-08-15 PROCEDURE — 3079F DIAST BP 80-89 MM HG: CPT | Mod: CPTII,S$GLB,, | Performed by: INTERNAL MEDICINE

## 2019-08-15 PROCEDURE — 99213 PR OFFICE/OUTPT VISIT, EST, LEVL III, 20-29 MIN: ICD-10-PCS | Mod: S$GLB,,, | Performed by: INTERNAL MEDICINE

## 2019-08-15 PROCEDURE — 3074F PR MOST RECENT SYSTOLIC BLOOD PRESSURE < 130 MM HG: ICD-10-PCS | Mod: CPTII,S$GLB,, | Performed by: INTERNAL MEDICINE

## 2019-08-15 PROCEDURE — 99213 OFFICE O/P EST LOW 20 MIN: CPT | Mod: S$GLB,,, | Performed by: INTERNAL MEDICINE

## 2019-08-15 PROCEDURE — 87880 POCT RAPID STREP A: ICD-10-PCS | Mod: QW,S$GLB,, | Performed by: INTERNAL MEDICINE

## 2019-08-15 PROCEDURE — 99999 PR PBB SHADOW E&M-EST. PATIENT-LVL III: CPT | Mod: PBBFAC,,, | Performed by: INTERNAL MEDICINE

## 2019-08-15 PROCEDURE — 1101F PT FALLS ASSESS-DOCD LE1/YR: CPT | Mod: CPTII,S$GLB,, | Performed by: INTERNAL MEDICINE

## 2019-08-15 PROCEDURE — 1101F PR PT FALLS ASSESS DOC 0-1 FALLS W/OUT INJ PAST YR: ICD-10-PCS | Mod: CPTII,S$GLB,, | Performed by: INTERNAL MEDICINE

## 2019-08-15 PROCEDURE — 3074F SYST BP LT 130 MM HG: CPT | Mod: CPTII,S$GLB,, | Performed by: INTERNAL MEDICINE

## 2019-08-15 PROCEDURE — 3079F PR MOST RECENT DIASTOLIC BLOOD PRESSURE 80-89 MM HG: ICD-10-PCS | Mod: CPTII,S$GLB,, | Performed by: INTERNAL MEDICINE

## 2019-08-15 PROCEDURE — 87880 STREP A ASSAY W/OPTIC: CPT | Mod: QW,S$GLB,, | Performed by: INTERNAL MEDICINE

## 2019-08-15 NOTE — PROGRESS NOTES
This note was created by combination of typed  and Dragon dictation.  Transcription errors may be present.  If there are any questions, please contact me.    Assessment / Plan:   Pharyngitis, unspecified etiology  -so far exam consistent with viral.  Rapid strep negative.  I expect other symptoms do appear over the course of several days.  Over-the-counter supportive care at this time.  -     POCT rapid strep A    There are no discontinued medications.    meds sent this encounter:       Follow Up: No follow-ups on file.      Subjective:     Chief Complaint   Patient presents with    Sore Throat     symptoms started on yesterday    Otalgia       MELVA Garcia is a 64 y.o. female, last appointment with this clinic was 4/5/2019.    No LMP recorded. Patient is postmenopausal.    Sore throat, ears itchy, started yesterday. No fever maybe chills. No sick contacts. Worse today. No sinus congestion no chest congestion. Last night took nyquil. Eyes uncomfortable but no drainage. No photophobia.     Patient Care Team:  Chicho Meléndez MD as PCP - General (Internal Medicine)  Edgard Multani II, MD as Consulting Physician (Endocrinology)  Tatianna Latham MD as Consulting Physician (Physical Medicine and Rehabilitation)  Josh Daily MD (Inactive) as Obstetrician (Obstetrics)    Patient Active Problem List    Diagnosis Date Noted    Chronic midline low back pain without sciatica 08/10/2016     8/10/2016 MRI L spine: 1. Significant degenerative disc disease at L5-S1 resulting in mild bilateral neuroforaminal narrowing.  Otherwise mild multilevel lumbar spondylosis. 2. Cholelithiasis.  MRI of T spine showed: 1. Mild degenerative changes of the thoracic spine. No spinal canal stenosis or neuroforaminal narrowing.  Spinal cord signal and morphology are maintained.2.  Cholelithiasis. 3.  Subcentimeter T2 hyperintense lesion at the liver dome incompletely characterized.      Hypothyroidism due to Hashimoto's  thyroiditis 2015    Migraine syndrome 2015     Symptoms - tightness in the back of the neck and scotomas - really more opthalmic migraine.  2015 MRI brain neg  2015 carotid US negative.      Essential hypertension 2014    Tubular adenoma of colon  and 2014 colonoscopy with sigmoid hyperplastic polyp with suggestion of focal early adenomatous change; melanosis coli.  3/23/2018 colonoscopy rectal sessile tubular adenoma      Alopecia areata 2014    Asymptomatic cholelithiasis 10/13/2012    Lichen planus 10/13/2012    Scoliosis 10/13/2012       PAST MEDICAL HISTORY:  Past Medical History:   Diagnosis Date    Asymptomatic cholelithiasis     Hypothyroidism     Lichen planus     Scoliosis     Unspecified essential hypertension 3/25/2014       PAST SURGICAL HISTORY:  Past Surgical History:   Procedure Laterality Date    COLONOSCOPY  2006    normal.   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     Employer: RETIRED   Social Needs    Financial resource strain: Not on file    Food insecurity:     Worry: Not on file     Inability: Not on file    Transportation needs:     Medical: Not on file     Non-medical: Not on file   Tobacco Use    Smoking status: Former Smoker     Last attempt to quit: 10/12/2004     Years since quittin.8    Smokeless tobacco: Never Used   Substance and Sexual Activity    Alcohol use: Yes     Comment: occassionally    Drug use: No    Sexual activity:  "Yes   Lifestyle    Physical activity:     Days per week: Not on file     Minutes per session: Not on file    Stress: Not on file   Relationships    Social connections:     Talks on phone: Not on file     Gets together: Not on file     Attends Synagogue service: Not on file     Active member of club or organization: Not on file     Attends meetings of clubs or organizations: Not on file     Relationship status: Not on file   Other Topics Concern    Are you pregnant or think you may be? Not Asked    Breast-feeding Not Asked   Social History Narrative    Not on file        ALLERGIES AND MEDICATIONS: updated and reviewed.  Review of patient's allergies indicates:   Allergen Reactions    Iodinated contrast- oral and iv dye      Current Outpatient Medications   Medication Sig Dispense Refill    aspirin (ECOTRIN) 81 MG EC tablet Take 1 tablet (81 mg total) by mouth once daily. 100 tablet prn    levothyroxine (SYNTHROID) 75 MCG tablet Take 1 tablet (75 mcg total) by mouth before breakfast. 90 tablet 3    losartan (COZAAR) 25 MG tablet Take 1 tablet (25 mg total) by mouth once daily. 90 tablet 3     No current facility-administered medications for this visit.        Review of Systems   Constitutional: Positive for chills. Negative for fever and malaise/fatigue.   HENT: Positive for sore throat. Negative for congestion, ear pain and hearing loss.    Respiratory: Positive for cough. Negative for sputum production and shortness of breath.    Cardiovascular: Negative for chest pain.   Musculoskeletal: Negative for myalgias and neck pain.   Skin: Negative for rash.   Neurological: Negative for headaches.       Objective:   Physical Exam  Vitals:    08/15/19 1039   BP: 120/86   BP Location: Left arm   Patient Position: Sitting   BP Method: Medium (Manual)   Pulse: 73   Temp: 98 °F (36.7 °C)   TempSrc: Oral   SpO2: 98%   Weight: 83 kg (183 lb)   Height: 5' 9" (1.753 m)    Body mass index is 27.02 kg/m².  Weight: 83 kg " "(183 lb)   Height: 5' 9" (175.3 cm)     Physical Exam   Constitutional: She is oriented to person, place, and time. She appears well-developed and well-nourished.   HENT:   TMs grey/clear bilaterally.  OP erythema; tonsils surgically absent   Eyes: EOM are normal.   Neck: Neck supple.   Cardiovascular: Normal rate, regular rhythm and normal heart sounds.   Pulmonary/Chest: Effort normal and breath sounds normal. She has no wheezes.   Lymphadenopathy:     She has no cervical adenopathy.   Neurological: She is alert and oriented to person, place, and time.   Skin: Skin is warm and dry.   Psychiatric: She has a normal mood and affect. Her behavior is normal.     POCT strep negative.  "

## 2019-08-30 ENCOUNTER — OFFICE VISIT (OUTPATIENT)
Dept: FAMILY MEDICINE | Facility: CLINIC | Age: 64
End: 2019-08-30
Payer: MEDICARE

## 2019-08-30 ENCOUNTER — PATIENT MESSAGE (OUTPATIENT)
Dept: FAMILY MEDICINE | Facility: CLINIC | Age: 64
End: 2019-08-30

## 2019-08-30 VITALS
HEIGHT: 69 IN | HEART RATE: 80 BPM | WEIGHT: 184.5 LBS | TEMPERATURE: 98 F | BODY MASS INDEX: 27.33 KG/M2 | OXYGEN SATURATION: 96 % | DIASTOLIC BLOOD PRESSURE: 88 MMHG | SYSTOLIC BLOOD PRESSURE: 122 MMHG

## 2019-08-30 DIAGNOSIS — Z23 NEED FOR SHINGLES VACCINE: ICD-10-CM

## 2019-08-30 DIAGNOSIS — E03.8 HYPOTHYROIDISM DUE TO HASHIMOTO'S THYROIDITIS: Chronic | ICD-10-CM

## 2019-08-30 DIAGNOSIS — E06.3 HYPOTHYROIDISM DUE TO HASHIMOTO'S THYROIDITIS: Chronic | ICD-10-CM

## 2019-08-30 DIAGNOSIS — R42 VERTIGO: Primary | ICD-10-CM

## 2019-08-30 DIAGNOSIS — Z01.00 EXAMINATION OF EYES AND VISION: ICD-10-CM

## 2019-08-30 DIAGNOSIS — I10 ESSENTIAL HYPERTENSION: ICD-10-CM

## 2019-08-30 PROCEDURE — 3079F DIAST BP 80-89 MM HG: CPT | Mod: CPTII,S$GLB,, | Performed by: NURSE PRACTITIONER

## 2019-08-30 PROCEDURE — 99214 PR OFFICE/OUTPT VISIT, EST, LEVL IV, 30-39 MIN: ICD-10-PCS | Mod: S$GLB,,, | Performed by: NURSE PRACTITIONER

## 2019-08-30 PROCEDURE — 99999 PR PBB SHADOW E&M-EST. PATIENT-LVL IV: ICD-10-PCS | Mod: PBBFAC,,, | Performed by: NURSE PRACTITIONER

## 2019-08-30 PROCEDURE — 99214 OFFICE O/P EST MOD 30 MIN: CPT | Mod: S$GLB,,, | Performed by: NURSE PRACTITIONER

## 2019-08-30 PROCEDURE — 3074F PR MOST RECENT SYSTOLIC BLOOD PRESSURE < 130 MM HG: ICD-10-PCS | Mod: CPTII,S$GLB,, | Performed by: NURSE PRACTITIONER

## 2019-08-30 PROCEDURE — 99999 PR PBB SHADOW E&M-EST. PATIENT-LVL IV: CPT | Mod: PBBFAC,,, | Performed by: NURSE PRACTITIONER

## 2019-08-30 PROCEDURE — 1101F PR PT FALLS ASSESS DOC 0-1 FALLS W/OUT INJ PAST YR: ICD-10-PCS | Mod: CPTII,S$GLB,, | Performed by: NURSE PRACTITIONER

## 2019-08-30 PROCEDURE — 3079F PR MOST RECENT DIASTOLIC BLOOD PRESSURE 80-89 MM HG: ICD-10-PCS | Mod: CPTII,S$GLB,, | Performed by: NURSE PRACTITIONER

## 2019-08-30 PROCEDURE — 1101F PT FALLS ASSESS-DOCD LE1/YR: CPT | Mod: CPTII,S$GLB,, | Performed by: NURSE PRACTITIONER

## 2019-08-30 PROCEDURE — 3074F SYST BP LT 130 MM HG: CPT | Mod: CPTII,S$GLB,, | Performed by: NURSE PRACTITIONER

## 2019-08-30 RX ORDER — MECLIZINE HCL 12.5 MG 12.5 MG/1
12.5 TABLET ORAL 3 TIMES DAILY PRN
Qty: 30 TABLET | Refills: 2 | Status: SHIPPED | OUTPATIENT
Start: 2019-08-30 | End: 2020-08-28 | Stop reason: ALTCHOICE

## 2019-08-30 NOTE — PROGRESS NOTES
History of Present Illness   Radha Farga is a 64 y.o. woman with medical history as listed below who presents today for evaluation of intermittent dizziness for about one month. The symptoms occur randomly and are described as a rotational spinning sensation. Dizziness resolves within minutes. She denies lightheadedness, syncope, chest pain, palpitations, shortness of breath, or swelling to lower extremities. She has had headaches but denies blurred vision, focal weakness, slurred speech, confusion, change in sensation, numbness or tingling. She does report recent sinus symptoms. She denies fevers or chills. She has not tried OTC medication for symptoms. She has no additional complaints and is otherwise healthy on today's visit.    Past Medical History:   Diagnosis Date    Asymptomatic cholelithiasis     Hypothyroidism     Lichen planus     Scoliosis     Unspecified essential hypertension 3/25/2014       Past Surgical History:   Procedure Laterality Date    COLONOSCOPY      normal.   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   Social Needs    Financial resource strain: Not hard at all    Food insecurity:     Worry: Never true     Inability: Never true    Transportation needs:     Medical: No     Non-medical: No   Tobacco Use    Smoking status: Former Smoker     Last attempt to quit: 10/12/2004     Years since quittin.8    Smokeless tobacco: Never Used   Substance and Sexual Activity    Alcohol use: Yes     Frequency:  "2-3 times a week     Drinks per session: 1 or 2     Binge frequency: Never     Comment: occassionally    Drug use: No    Sexual activity: Yes   Lifestyle    Physical activity:     Days per week: 2 days     Minutes per session: 30 min    Stress: Only a little   Relationships    Social connections:     Talks on phone: Three times a week     Gets together: Three times a week     Attends Taoist service: Not on file     Active member of club or organization: Yes     Attends meetings of clubs or organizations: More than 4 times per year     Relationship status:    Other Topics Concern    Are you pregnant or think you may be? Not Asked    Breast-feeding Not Asked   Social History Narrative    Not on file       Family History   Adopted: Yes   Problem Relation Age of Onset    No Known Problems Daughter     Hypertension Son     No Known Problems Son     Hypertension Sister     Skin cancer Neg Hx        Review of Systems  Review of Systems   Constitutional: Negative for chills, fever and malaise/fatigue.   Eyes: Negative for discharge.   Cardiovascular: Negative for chest pain, palpitations and leg swelling.   Neurological: Positive for dizziness and headaches. Negative for tingling, sensory change, speech change, focal weakness, loss of consciousness and weakness.   Endo/Heme/Allergies: Positive for environmental allergies.     A complete review of systems was otherwise negative.    Physical Exam  /88   Pulse 80   Temp 98.3 °F (36.8 °C) (Oral)   Ht 5' 9" (1.753 m)   Wt 83.7 kg (184 lb 8.4 oz)   SpO2 96%   BMI 27.25 kg/m²   General appearance: alert, appears stated age, cooperative and no distress  Eyes: negative findings: lids and lashes normal, conjunctivae and sclerae normal, pupils equal, round, reactive to light and accomodation and visual fields full to confrontation  Ears: normal TM's and external ear canals both ears  Nose: Nares normal. Septum midline. Mucosa normal. No drainage or " sinus tenderness.  Throat: lips, mucosa, and tongue normal; teeth and gums normal  Lungs: clear to auscultation bilaterally  Heart: regular rate and rhythm, S1, S2 normal, no murmur, click, rub or gallop  Extremities: extremities normal, atraumatic, no cyanosis or edema  Pulses: 2+ and symmetric  Skin: Skin color, texture, turgor normal. No rashes or lesions  Lymph nodes: Cervical, supraclavicular, and axillary nodes normal.  Neurologic: Mental status: Alert, oriented, thought content appropriate  Cranial nerves: normal  Sensory: normal  Motor:grossly normal  Coordination: normal  Gait: Normal    Assessment/Plan  Vertigo  Reassuring neurological and cardiovascular exam today.  Presentation consistent with vertigo.  Meclizine provided PRN use.  Handout provided on tips to manage at home.  ER precautions discussed.  RTC PRN.  -     meclizine (ANTIVERT) 12.5 mg tablet; Take 1 tablet (12.5 mg total) by mouth 3 (three) times daily as needed.  Dispense: 30 tablet; Refill: 2    Essential hypertension  The current medical regimen is effective;  continue present plan and medications.    Hypothyroidism due to Hashimoto's thyroiditis  The current medical regimen is effective;  continue present plan and medications.    Need for shingles vaccine  Patient will call to schedule  -     (In Office Administered) Zoster Recombinant Vaccine    Examination of eyes and vision  Requesting routine eye exam, referral placed.  -     Ambulatory referral to Ophthalmology    Patient has verbalized understanding and is in agreement with plan of care.    Follow up if symptoms worsen or fail to improve.

## 2019-08-30 NOTE — PATIENT INSTRUCTIONS
Dizziness (Vertigo) and Balance Problems: Staying Safe     Replace burned-out lightbulbs to keep your home safe and well lit.   Falls or accidents can lead to pain, broken bones, and fear of future falls. Protect yourself and others by preparing for episodes. Simple steps can help you stay safe at home and wherever you go.  Lighting  Keep all areas well lit. This helps your eyes send the right signals to the brain. It also makes you less likely to trip and fall. If bright lights make symptoms worse, dim the lights or lie in a dark room until the dizziness passes. Then turn the lights back to their normal level.  Tips:  · Keep a flashlight by the bed.  · Place nightlights in bathrooms and hallways.  · Replace burned-out bulbs, or have someone replace them for you.  Preventing falls  To reduce your risk of falling:  · Get out of bed or up from a chair slowly.  · Wear low-heeled shoes that fit properly and have slip-resistant soles.  · Remove throw rugs. Clear clutter from walkways.  · Use handrails on stairs. Have handrails installed or adjusted if needed.  · Install grab bars in the bathroom. Don't use towel racks for balance.  · Use a shower stool. Also put adhesive strips in the shower or on the tub floor.  Going out  With a little time and preparation, you can get around safely.  Tips:  · Bring a cane or walking aid if needed.  · Give yourself plenty of time in case you start to get dizzy.  · Ask your healthcare provider what type of exercise is safe for your condition.  · Be patient. If an activity such as walking through a crowded shop causes you stress, you may not be ready for it yet.  Driving  If you become dizzy or disoriented while driving, you could hurt yourself and others. That's why it's best to not drive until symptoms have gone away. In some cases, your license may be temporarily held until it's safe for you to drive again.  For safety:  · Ask a friend to drive for you.  · Take public  transportation.  · Walk to stores and other places when you can.  Asking for help  Don't be afraid to ask for help running errands, cooking meals, and doing exercise. Whether it's a friend, loved one, neighbor, or stranger on the street, a little help can make a world of difference.   Date Last Reviewed: 11/1/2016  © 3540-1677 [employer] [address] [place] [address], [place] [address]. All rights reserved. This information is not intended as a substitute for professional medical care. Always follow your healthcare professional's instructions.

## 2019-09-03 ENCOUNTER — TELEPHONE (OUTPATIENT)
Dept: OPTOMETRY | Facility: CLINIC | Age: 64
End: 2019-09-03

## 2019-09-03 ENCOUNTER — OFFICE VISIT (OUTPATIENT)
Dept: OPTOMETRY | Facility: CLINIC | Age: 64
End: 2019-09-03
Payer: MEDICARE

## 2019-09-03 DIAGNOSIS — H02.054 TRICHIASIS OF LEFT UPPER EYELID: ICD-10-CM

## 2019-09-03 DIAGNOSIS — H52.4 PRESBYOPIA OF BOTH EYES: ICD-10-CM

## 2019-09-03 DIAGNOSIS — H40.053 BILATERAL OCULAR HYPERTENSION: Primary | ICD-10-CM

## 2019-09-03 DIAGNOSIS — H52.02 HYPERMETROPIA OF LEFT EYE: ICD-10-CM

## 2019-09-03 DIAGNOSIS — H25.13 NUCLEAR SCLEROSIS OF BOTH EYES: ICD-10-CM

## 2019-09-03 PROCEDURE — 92250 FUNDUS PHOTOGRAPHY W/I&R: CPT | Mod: S$GLB,,, | Performed by: OPTOMETRIST

## 2019-09-03 PROCEDURE — 99999 PR PBB SHADOW E&M-EST. PATIENT-LVL III: CPT | Mod: PBBFAC,,, | Performed by: OPTOMETRIST

## 2019-09-03 PROCEDURE — 92004 COMPRE OPH EXAM NEW PT 1/>: CPT | Mod: S$GLB,,, | Performed by: OPTOMETRIST

## 2019-09-03 PROCEDURE — 92015 PR REFRACTION: ICD-10-PCS | Mod: S$GLB,,, | Performed by: OPTOMETRIST

## 2019-09-03 PROCEDURE — 92004 PR EYE EXAM, NEW PATIENT,COMPREHESV: ICD-10-PCS | Mod: S$GLB,,, | Performed by: OPTOMETRIST

## 2019-09-03 PROCEDURE — 92250 COLOR FUNDUS PHOTOGRAPHY - OU - BOTH EYES: ICD-10-PCS | Mod: S$GLB,,, | Performed by: OPTOMETRIST

## 2019-09-03 PROCEDURE — 92015 DETERMINE REFRACTIVE STATE: CPT | Mod: S$GLB,,, | Performed by: OPTOMETRIST

## 2019-09-03 PROCEDURE — 99999 PR PBB SHADOW E&M-EST. PATIENT-LVL III: ICD-10-PCS | Mod: PBBFAC,,, | Performed by: OPTOMETRIST

## 2019-09-03 NOTE — PROGRESS NOTES
HPI     Concerns About Ocular Health      Additional comments: Recent vertigo-like symptoms, and nurse   practitioner has recommended eye examination to rule out ocular/vision   etiology.  Was advised to take Meclazine as needed, but has not yet needed.               Comments     Patient's age: 64 y.o. WF  Occupation: retired   Approximate date of last eye examination:  Over 5 yrs   Name of last eye doctor seen: Doctor on [facility], no longer there   City/State: n/a   Wears glasses? reader     If yes, wears  Full-time or part-time?  Part time   Present glasses are: Bifocal, SV Distance, SV Reading?  OTC readers   Approximate age of present glasses:  n/a   Got new glasses following last exam, or subsequently?:  N/a    Any problem with VA with glasses?  n/a  Wears CLs?:  n/a   Headaches?  Yes   Eye pain/discomfort?  no                                                                                     Flashes?  no  Floaters?  no  Diplopia/Double vision?  no  Patient's Ocular History:         Any eye surgeries? no         Any eye injury?  no         Any treatment for eye disease?  no  Family history of eye disease?  Pt was adopted   Significant patient medical history:         1. Diabetes?  no       If yes, IDDM or NIDDM? n/a   2. HBP?  YES               3. Other (describe):  NP recommended eye exam due to   possibility of vertigo   ! OTC eyedrops currently using:  OTC tears PRN   ! Prescription eye meds currently using:  no   ! Any history of allergy/adverse reaction to any eye meds used   previously?  no    ! Any history of allergy/adverse reaction to eyedrops used during prior   eye exam(s)? no    ! Any history of allergy/adverse reaction to Novacaine or similar meds?   no   ! Any history of allergy/adverse reaction to Epinephrine or similar meds?   no    ! Patient okay with use of anesthetic eyedrops to check eye pressure?    Yes         ! Patient okay with use of eyedrops to dilate pupils today?  Yes    !   Allergies/Medications/Medical History/Family History reviewed today?    Yes       PD =     Desired reading distance =      Auto refraction: OD  +0.50 DS                            OS   +0.50 +0.25 135                                                                    Last edited by Obi Myers, OD on 9/3/2019  1:29 PM. (History)            Assessment /Plan     For exam results, see Encounter Report.    1. Bilateral ocular hypertension  Color Fundus Photography - OU - Both Eyes    Pearson Visual Field - OU - Extended - Both Eyes    Posterior Segment OCT Optic Nerve- Both eyes   2. Nuclear sclerosis of both eyes     3. Trichiasis of left upper eyelid     4. Hypermetropia of left eye     5. Presbyopia of both eyes                     Bilateral ocular hypertension.  No evidence of glaucomatous changes in the appearance of the optic nerve head.  Suggest work up as glaucoma suspect, as a precaution.  Order stereo disc photos to be taken today.  Assistant to call to schedule Pearson Visual Field (HVF) test (24-2 AUGUSTIN Standard) and OCT retinal nerve fiber layer thickness analysis, and follow-up visit with me (Dr. Myers) on the same date.    Trichiasis of nasal left upper eyelid.  Epilated errant lash with jewelers forceps.    Trace nuclear sclerosis of lens of both eyes, consistent with age.    Emmetropia at distance in the right eye, and slight hyperopia in the left eye.  Presbyopia.  Spectacle lens Rx issued for use as desired.  Okay to use +2.00 OTC reading lenses, if happy with near vision with those glasses and happy with unaided distance VA.    Repeat refraction in one year.

## 2019-09-03 NOTE — PATIENT INSTRUCTIONS
Bilateral ocular hypertension.  No evidence of glaucomatous changes in the appearance of the optic nerve head.  Suggest work up as glaucoma suspect, as a precaution.  Order stereo disc photos to be taken today.  Assistant to call to schedule Pearson Visual Field (HVF) test (24-2 AUGUSTIN Standard) and OCT retinal nerve fiber layer thickness analysis, and follow-up visit with me (Dr. Myers) on the same date.    Trichiasis of nasal left upper eyelid.  Epilated errant lash with jewelers forceps.    Trace nuclear sclerosis of lens of both eyes, consistent with age.    Emmetropia at distance in the right eye, and slight hyperopia in the left eye.  Presbyopia.  Spectacle lens Rx issued for use as desired.  Okay to use +2.00 OTC reading lenses, if happy with near vision with those glasses and happy with unaided distance VA.    Repeat refraction in one year.

## 2019-11-27 ENCOUNTER — PATIENT OUTREACH (OUTPATIENT)
Dept: ADMINISTRATIVE | Facility: OTHER | Age: 64
End: 2019-11-27

## 2019-12-02 ENCOUNTER — CLINICAL SUPPORT (OUTPATIENT)
Dept: OPHTHALMOLOGY | Facility: CLINIC | Age: 64
End: 2019-12-02
Payer: MEDICARE

## 2019-12-02 ENCOUNTER — OFFICE VISIT (OUTPATIENT)
Dept: OPTOMETRY | Facility: CLINIC | Age: 64
End: 2019-12-02
Payer: MEDICARE

## 2019-12-02 DIAGNOSIS — H40.053 BILATERAL OCULAR HYPERTENSION: Primary | ICD-10-CM

## 2019-12-02 DIAGNOSIS — H25.13 NUCLEAR SCLEROSIS OF BOTH EYES: ICD-10-CM

## 2019-12-02 DIAGNOSIS — H40.053 BILATERAL OCULAR HYPERTENSION: ICD-10-CM

## 2019-12-02 PROCEDURE — 92012 PR EYE EXAM, EST PATIENT,INTERMED: ICD-10-PCS | Mod: S$GLB,,, | Performed by: OPTOMETRIST

## 2019-12-02 PROCEDURE — 99999 PR PBB SHADOW E&M-EST. PATIENT-LVL III: CPT | Mod: PBBFAC,,, | Performed by: OPTOMETRIST

## 2019-12-02 PROCEDURE — 92083 EXTENDED VISUAL FIELD XM: CPT | Mod: S$GLB,,, | Performed by: OPTOMETRIST

## 2019-12-02 PROCEDURE — 99499 RISK ADDL DX/OHS AUDIT: ICD-10-PCS | Mod: S$GLB,,, | Performed by: OPTOMETRIST

## 2019-12-02 PROCEDURE — 92012 INTRM OPH EXAM EST PATIENT: CPT | Mod: S$GLB,,, | Performed by: OPTOMETRIST

## 2019-12-02 PROCEDURE — 99999 PR PBB SHADOW E&M-EST. PATIENT-LVL III: ICD-10-PCS | Mod: PBBFAC,,, | Performed by: OPTOMETRIST

## 2019-12-02 PROCEDURE — 92083 HUMPHREY VISUAL FIELD - OU - BOTH EYES: ICD-10-PCS | Mod: S$GLB,,, | Performed by: OPTOMETRIST

## 2019-12-02 PROCEDURE — 99499 UNLISTED E&M SERVICE: CPT | Mod: S$GLB,,, | Performed by: OPTOMETRIST

## 2019-12-02 NOTE — PROGRESS NOTES
HPI     HVF      Additional comments: Pt states no problems.  Ms. Fraga in today for recheck of IOP in both eyes, and to discuss   results of OCT retinal nerve fiber layer thickness analysis and HVF test   (24-2) done today.               Comments     Patient in for progress check:  Recheck IOP and to discuss results of OCT   RNFL thickness analysis and HVF test (24-2) done today.    Being followed for (diagnosis):  Bilateral Ocular Hypertension    Date last seen:  09/03/2019    Doctor last seen:  Dr Myers    Prescribed eye medications(s) using:  No     OTC eye medication(s) using:  No     Signs/symptoms of condition resolved/better/stable/worse?:  n/a                  Last edited by Obi Myers, OD on 12/2/2019  5:02 PM. (History)            Assessment /Plan     For exam results, see Encounter Report.    1. Bilateral ocular hypertension     2. Nuclear sclerosis of both eyes                Prior finding of bilateral ocular hypertension at the last visit.   Ms. Fraga returns today for recheck of IOP, and OCT retinal nerve fiber layer (RNFL) thickness analysis, and Pearson Visual Field (HVF) test.  RNFL thickness within normal range 360° in each eye.  No evidence of glaucomatous visual field defect in either eye.  Intraocular pressure high-normal in each eye today (decreased in each eye from that noted on previous examination.  Discussed with Ms. Fraga.  No apparent need to initiate treatment for IOP control/reduction in either eye at this time.  Monitor only.  Return in 09/2020 for annual general eye examination and refraction

## 2019-12-02 NOTE — PATIENT INSTRUCTIONS
Prior finding of bilateral ocular hypertension at the last visit.   Ms. Fraga returns today for recheck of IOP, and OCT retinal nerve fiber layer (RNFL) thickness analysis, and Pearson Visual Field (HVF) test.  RNFL thickness within normal range 360° in each eye.  No evidence of glaucomatous visual field defect in either eye.  Intraocular pressure high-normal in each eye today (decreased in each eye from that noted on previous examination.  Discussed with Ms. Fraga.  No apparent need to initiate treatment for IOP control/reduction in either eye at this time.  Monitor only.  Return in 09/2020 for annual general eye examination and refraction

## 2020-01-06 DIAGNOSIS — I10 ESSENTIAL HYPERTENSION: ICD-10-CM

## 2020-04-20 DIAGNOSIS — E03.8 HYPOTHYROIDISM DUE TO HASHIMOTO'S THYROIDITIS: Chronic | ICD-10-CM

## 2020-04-20 DIAGNOSIS — I10 ESSENTIAL HYPERTENSION: ICD-10-CM

## 2020-04-20 DIAGNOSIS — E06.3 HYPOTHYROIDISM DUE TO HASHIMOTO'S THYROIDITIS: Chronic | ICD-10-CM

## 2020-04-20 RX ORDER — LEVOTHYROXINE SODIUM 75 UG/1
TABLET ORAL
Qty: 90 TABLET | Refills: 0 | Status: SHIPPED | OUTPATIENT
Start: 2020-04-20 | End: 2020-07-20

## 2020-04-20 RX ORDER — LOSARTAN POTASSIUM 25 MG/1
TABLET ORAL
Qty: 90 TABLET | Refills: 0 | Status: SHIPPED | OUTPATIENT
Start: 2020-04-20 | End: 2020-07-20

## 2020-07-24 ENCOUNTER — OFFICE VISIT (OUTPATIENT)
Dept: FAMILY MEDICINE | Facility: CLINIC | Age: 65
End: 2020-07-24
Payer: MEDICARE

## 2020-07-24 VITALS
BODY MASS INDEX: 28.16 KG/M2 | OXYGEN SATURATION: 97 % | TEMPERATURE: 98 F | DIASTOLIC BLOOD PRESSURE: 80 MMHG | WEIGHT: 190.69 LBS | HEART RATE: 78 BPM | SYSTOLIC BLOOD PRESSURE: 130 MMHG | RESPIRATION RATE: 16 BRPM

## 2020-07-24 DIAGNOSIS — J02.9 PHARYNGITIS, UNSPECIFIED ETIOLOGY: Primary | ICD-10-CM

## 2020-07-24 DIAGNOSIS — R09.82 POSTNASAL DISCHARGE: ICD-10-CM

## 2020-07-24 LAB
CTP QC/QA: YES
S PYO RRNA THROAT QL PROBE: NEGATIVE

## 2020-07-24 PROCEDURE — 87880 POCT RAPID STREP A: ICD-10-PCS | Mod: QW,S$GLB,, | Performed by: INTERNAL MEDICINE

## 2020-07-24 PROCEDURE — 3008F BODY MASS INDEX DOCD: CPT | Mod: CPTII,S$GLB,, | Performed by: INTERNAL MEDICINE

## 2020-07-24 PROCEDURE — 3008F PR BODY MASS INDEX (BMI) DOCUMENTED: ICD-10-PCS | Mod: CPTII,S$GLB,, | Performed by: INTERNAL MEDICINE

## 2020-07-24 PROCEDURE — 99214 OFFICE O/P EST MOD 30 MIN: CPT | Mod: 25,S$GLB,, | Performed by: INTERNAL MEDICINE

## 2020-07-24 PROCEDURE — 1159F MED LIST DOCD IN RCRD: CPT | Mod: S$GLB,,, | Performed by: INTERNAL MEDICINE

## 2020-07-24 PROCEDURE — 99999 PR PBB SHADOW E&M-EST. PATIENT-LVL III: CPT | Mod: PBBFAC,,, | Performed by: INTERNAL MEDICINE

## 2020-07-24 PROCEDURE — 99214 PR OFFICE/OUTPT VISIT, EST, LEVL IV, 30-39 MIN: ICD-10-PCS | Mod: 25,S$GLB,, | Performed by: INTERNAL MEDICINE

## 2020-07-24 PROCEDURE — 1159F PR MEDICATION LIST DOCUMENTED IN MEDICAL RECORD: ICD-10-PCS | Mod: S$GLB,,, | Performed by: INTERNAL MEDICINE

## 2020-07-24 PROCEDURE — 87880 STREP A ASSAY W/OPTIC: CPT | Mod: QW,S$GLB,, | Performed by: INTERNAL MEDICINE

## 2020-07-24 PROCEDURE — 99999 PR PBB SHADOW E&M-EST. PATIENT-LVL III: ICD-10-PCS | Mod: PBBFAC,,, | Performed by: INTERNAL MEDICINE

## 2020-07-24 PROCEDURE — 3075F PR MOST RECENT SYSTOLIC BLOOD PRESS GE 130-139MM HG: ICD-10-PCS | Mod: CPTII,S$GLB,, | Performed by: INTERNAL MEDICINE

## 2020-07-24 PROCEDURE — 1101F PR PT FALLS ASSESS DOC 0-1 FALLS W/OUT INJ PAST YR: ICD-10-PCS | Mod: CPTII,S$GLB,, | Performed by: INTERNAL MEDICINE

## 2020-07-24 PROCEDURE — 3075F SYST BP GE 130 - 139MM HG: CPT | Mod: CPTII,S$GLB,, | Performed by: INTERNAL MEDICINE

## 2020-07-24 PROCEDURE — 3079F DIAST BP 80-89 MM HG: CPT | Mod: CPTII,S$GLB,, | Performed by: INTERNAL MEDICINE

## 2020-07-24 PROCEDURE — 1101F PT FALLS ASSESS-DOCD LE1/YR: CPT | Mod: CPTII,S$GLB,, | Performed by: INTERNAL MEDICINE

## 2020-07-24 PROCEDURE — 3079F PR MOST RECENT DIASTOLIC BLOOD PRESSURE 80-89 MM HG: ICD-10-PCS | Mod: CPTII,S$GLB,, | Performed by: INTERNAL MEDICINE

## 2020-07-24 RX ORDER — LEVOCETIRIZINE DIHYDROCHLORIDE 5 MG/1
5 TABLET, FILM COATED ORAL NIGHTLY
Qty: 30 TABLET | Refills: 2 | Status: SHIPPED | OUTPATIENT
Start: 2020-07-24 | End: 2020-08-28 | Stop reason: ALTCHOICE

## 2020-07-24 RX ORDER — FLUTICASONE PROPIONATE 50 MCG
1 SPRAY, SUSPENSION (ML) NASAL DAILY
Qty: 9.9 ML | Refills: 1 | Status: SHIPPED | OUTPATIENT
Start: 2020-07-24 | End: 2020-08-28 | Stop reason: ALTCHOICE

## 2020-07-24 NOTE — PROGRESS NOTES
Subjective:       Patient ID: Radha Fraga is a pleasant 64 y.o. White female patient    Chief Complaint: Sore Throat      Patient is a new pt to me but established pt from  Dr. Meléndez.    HPI     She reports a 5 day history of sore throat.  She also reports postnasal drip.  She has some itching in the right ear.  No fever, no headache, no cough or shortness of breath, no muscle pain no chills, no loss of taste or smell.  No sick contact.  She took some Tylenol and gargled with salted water    Of note she had the same symptoms in August last year, was strep negative.    Patient Active Problem List   Diagnosis    Asymptomatic cholelithiasis    Lichen planus    Scoliosis    Essential hypertension    Tubular adenoma of colon 2013 and 2018    Alopecia areata    Migraine syndrome    Hypothyroidism due to Hashimoto's thyroiditis    Chronic midline low back pain without sciatica          ACTIVE MEDICAL ISSUES:  Documented in Problem List     PAST MEDICAL HISTORY  Documented     PAST SURGICAL HISTORY:  Documented     SOCIAL HISTORY:  Documented     FAMILY HISTORY:  Documented     ALLERGIES AND MEDICATIONS: updated and reviewed.  Documented    Review of Systems   Constitutional: Negative.    HENT: Positive for ear pain (itching and pressure R ear), postnasal drip and sore throat. Negative for trouble swallowing and voice change.    Eyes: Negative.    Respiratory: Negative.    Cardiovascular: Negative.    All other systems reviewed and are negative.      Objective:      Physical Exam  Vitals signs and nursing note reviewed.   Constitutional:       Appearance: Normal appearance. She is normal weight.   HENT:      Head: Normocephalic and atraumatic.      Right Ear: Ear canal normal. There is no impacted cerumen.      Left Ear: Ear canal normal. There is no impacted cerumen.      Ears:      Comments: Fluid behind both TMs      Nose: Rhinorrhea present.      Mouth/Throat:      Mouth: Mucous membranes are moist.       Pharynx: Oropharynx is clear. Posterior oropharyngeal erythema (very mild) present. No oropharyngeal exudate.      Comments: Mild postnasal drip  Eyes:      Extraocular Movements: Extraocular movements intact.      Conjunctiva/sclera: Conjunctivae normal.      Pupils: Pupils are equal, round, and reactive to light.   Cardiovascular:      Rate and Rhythm: Normal rate and regular rhythm.      Pulses: Normal pulses.      Heart sounds: Normal heart sounds.   Pulmonary:      Effort: Pulmonary effort is normal.      Breath sounds: Normal breath sounds.   Lymphadenopathy:      Cervical: No cervical adenopathy.   Neurological:      Mental Status: She is alert.         Vitals:    07/24/20 1052   BP: 130/80   BP Location: Right arm   Patient Position: Sitting   BP Method: Medium (Manual)   Pulse: 78   Resp: 16   Temp: 97.8 °F (36.6 °C)   SpO2: 97%   Weight: 86.5 kg (190 lb 11.2 oz)     Body mass index is 28.16 kg/m².    RESULTS: Reviewed labs from last 12 months    Assessment:       1. Pharyngitis, unspecified etiology    2. Postnasal discharge        Plan:   Radha was seen today for sore throat.    Diagnoses and all orders for this visit:    Pharyngitis, unspecified etiology  -     levocetirizine (XYZAL) 5 MG tablet; Take 1 tablet (5 mg total) by mouth every evening.  -     POCT Rapid Strep A    Strep test negative.  PE is benign.  Advised patient to go on with the gargling with salted water, she can not take NSAIDs.  She has to monitor her symptoms, and I advised her regarding symptoms of concern.  She will check her fever on regular basis and seek for medical attention would she present with any concern.  Printed material put on [employer].      Postnasal discharge  -     fluticasone propionate (FLONASE) 50 mcg/actuation nasal spray; 1 spray (50 mcg total) by Each Nostril route once daily.    Told her to use saline nasal spray, ordered Flonase, as well as antihistamine.  See above.    Follow up if symptoms worsen or fail to  improve.    This note was created by combination of typed  and M-Modal dictation.  Transcription errors may be present.  If there are any questions, please contact me.

## 2020-07-29 ENCOUNTER — PATIENT MESSAGE (OUTPATIENT)
Dept: FAMILY MEDICINE | Facility: CLINIC | Age: 65
End: 2020-07-29

## 2020-07-29 DIAGNOSIS — I10 ESSENTIAL HYPERTENSION: Primary | ICD-10-CM

## 2020-08-20 ENCOUNTER — LAB VISIT (OUTPATIENT)
Dept: LAB | Facility: HOSPITAL | Age: 65
End: 2020-08-20
Attending: INTERNAL MEDICINE
Payer: MEDICARE

## 2020-08-20 DIAGNOSIS — E06.3 HYPOTHYROIDISM DUE TO HASHIMOTO'S THYROIDITIS: Chronic | ICD-10-CM

## 2020-08-20 DIAGNOSIS — I10 ESSENTIAL HYPERTENSION: ICD-10-CM

## 2020-08-20 DIAGNOSIS — E03.8 HYPOTHYROIDISM DUE TO HASHIMOTO'S THYROIDITIS: Chronic | ICD-10-CM

## 2020-08-20 LAB
ALBUMIN SERPL BCP-MCNC: 4.1 G/DL (ref 3.5–5.2)
ALP SERPL-CCNC: 68 U/L (ref 55–135)
ALT SERPL W/O P-5'-P-CCNC: 20 U/L (ref 10–44)
ANION GAP SERPL CALC-SCNC: 10 MMOL/L (ref 8–16)
AST SERPL-CCNC: 25 U/L (ref 10–40)
BILIRUB SERPL-MCNC: 0.4 MG/DL (ref 0.1–1)
BUN SERPL-MCNC: 23 MG/DL (ref 8–23)
CALCIUM SERPL-MCNC: 9.7 MG/DL (ref 8.7–10.5)
CHLORIDE SERPL-SCNC: 108 MMOL/L (ref 95–110)
CHOLEST SERPL-MCNC: 151 MG/DL (ref 120–199)
CHOLEST/HDLC SERPL: 3.2 {RATIO} (ref 2–5)
CO2 SERPL-SCNC: 24 MMOL/L (ref 23–29)
CREAT SERPL-MCNC: 0.9 MG/DL (ref 0.5–1.4)
EST. GFR  (AFRICAN AMERICAN): >60 ML/MIN/1.73 M^2
EST. GFR  (NON AFRICAN AMERICAN): >60 ML/MIN/1.73 M^2
GLUCOSE SERPL-MCNC: 89 MG/DL (ref 70–110)
HDLC SERPL-MCNC: 47 MG/DL (ref 40–75)
HDLC SERPL: 31.1 % (ref 20–50)
LDLC SERPL CALC-MCNC: 87.4 MG/DL (ref 63–159)
NONHDLC SERPL-MCNC: 104 MG/DL
POTASSIUM SERPL-SCNC: 4.4 MMOL/L (ref 3.5–5.1)
PROT SERPL-MCNC: 7 G/DL (ref 6–8.4)
SODIUM SERPL-SCNC: 142 MMOL/L (ref 136–145)
TRIGL SERPL-MCNC: 83 MG/DL (ref 30–150)
TSH SERPL DL<=0.005 MIU/L-ACNC: 2.01 UIU/ML (ref 0.4–4)

## 2020-08-20 PROCEDURE — 36415 COLL VENOUS BLD VENIPUNCTURE: CPT | Mod: PO

## 2020-08-20 PROCEDURE — 80061 LIPID PANEL: CPT

## 2020-08-20 PROCEDURE — 80053 COMPREHEN METABOLIC PANEL: CPT

## 2020-08-20 PROCEDURE — 84443 ASSAY THYROID STIM HORMONE: CPT

## 2020-08-27 NOTE — PROGRESS NOTES
This note was created by combination of typed  and M-Modal dictation.  Transcription errors may be present.  If there are any questions, please contact me.    Assessment and Plan:   Normal physical exam  Encounter for screening mammogram for malignant neoplasm of breast  Tubular adenoma of colon 2013 and 2018  Need for vaccination for Strep pneumoniae  Need for shingles vaccine  -working with new diet program with success.  mammo ordered  Pneumovax  shingrix #1 today.  UTD pap no further pap testing.  -     Mammo Digital Screening Bilat; Future; Expected date: 08/28/2020  -     (In Office Administered) Pneumococcal Polysaccharide Vaccine (23 Valent) (SQ/IM)  -     (In Office Administered) Zoster Recombinant Vaccine    Essential hypertension  -stable refilled losartan, no home cuff advised to purchase and monitor.  -     losartan (COZAAR) 25 MG tablet; Take 1 tablet (25 mg total) by mouth once daily.  Dispense: 90 tablet; Refill: 3    Hypothyroidism due to Hashimoto's thyroiditis  -stable on LT 75, refilled to pharmacy  -     levothyroxine (SYNTHROID) 75 MCG tablet; Take 1 tablet (75 mcg total) by mouth before breakfast.  Dispense: 90 tablet; Refill: 3    Migraine syndrome  -stable.    Medications Discontinued During This Encounter   Medication Reason    meclizine (ANTIVERT) 12.5 mg tablet Therapy completed    fluticasone propionate (FLONASE) 50 mcg/actuation nasal spray Therapy completed    levocetirizine (XYZAL) 5 MG tablet Therapy completed    aspirin (ECOTRIN) 81 MG EC tablet Therapy completed    levothyroxine (SYNTHROID) 75 MCG tablet Reorder    losartan (COZAAR) 25 MG tablet Reorder       meds sent this encounter:  Medications Ordered This Encounter   Medications    levothyroxine (SYNTHROID) 75 MCG tablet     Sig: Take 1 tablet (75 mcg total) by mouth before breakfast.     Dispense:  90 tablet     Refill:  3    losartan (COZAAR) 25 MG tablet     Sig: Take 1 tablet (25 mg total) by mouth once  daily.     Dispense:  90 tablet     Refill:  3     .       Follow Up: No follow-ups on file.  Physical exam 1 year with pre visit labs to be ordered      Subjective:     Chief Complaint   Patient presents with    Annual Exam       HPI  Radha is a 65 y.o. female, last appointment with this clinic was Visit date not found.    No LMP recorded. Patient is postmenopausal.    previsit labs  CMP WNL  Lipid good  TSH WNL    Diet program - 3 proteins daily; 4 cups of vegetables; 1 animal protein.  Cut down on simple starches and sugars.  Last time they did it, 8 years ago,  lost lot of weight.   She has lost 12 pounds in 5 weeks.   3 phases.  First phase is till she gets to ideal weight. And then the other phases - add back some fats. Not really focused on calorie intake.     Overall feeling good.    Taking medicines regularly.  Denies obvious side effects.  Does not check her blood pressures at home and I have recommended that she start doing so.  BP good today on intake.      Patient Care Team:  Chicho Meléndez MD as PCP - General (Internal Medicine)  Edgard Multani II, MD as Consulting Physician (Endocrinology)  Tatianna Latham MD as Consulting Physician (Physical Medicine and Rehabilitation)  Josh Daily MD (Inactive) as Obstetrician (Obstetrics)  Noé Joyce MA as Care Coordinator    Patient Active Problem List    Diagnosis Date Noted    Chronic midline low back pain without sciatica 08/10/2016     8/10/2016 MRI L spine: 1. Significant degenerative disc disease at L5-S1 resulting in mild bilateral neuroforaminal narrowing.  Otherwise mild multilevel lumbar spondylosis. 2. Cholelithiasis.  MRI of T spine showed: 1. Mild degenerative changes of the thoracic spine. No spinal canal stenosis or neuroforaminal narrowing.  Spinal cord signal and morphology are maintained.2.  Cholelithiasis. 3.  Subcentimeter T2 hyperintense lesion at the liver dome incompletely characterized.      Hypothyroidism  due to Hashimoto's thyroiditis 06/16/2015    Migraine syndrome 05/19/2015     Symptoms - tightness in the back of the neck and scotomas - really more opthalmic migraine.  5/18/2015 MRI brain neg  5/18/2015 carotid US negative.      Essential hypertension 03/25/2014    Tubular adenoma of colon 2013 and 2018 03/25/2014 1/11/2013 colonoscopy with sigmoid hyperplastic polyp with suggestion of focal early adenomatous change; melanosis coli.  3/23/2018 colonoscopy rectal sessile tubular adenoma      Alopecia areata 03/25/2014    Asymptomatic cholelithiasis 10/13/2012    Lichen planus 10/13/2012    Scoliosis 10/13/2012       PAST MEDICAL HISTORY:  Past Medical History:   Diagnosis Date    Asymptomatic cholelithiasis     Hypothyroidism     Lichen planus     Scoliosis     Unspecified essential hypertension 3/25/2014       PAST SURGICAL HISTORY:  Past Surgical History:   Procedure Laterality Date    COLONOSCOPY  2006    normal.   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     Employer: RETIRED   Social Needs    Financial resource strain: Not hard at all    Food insecurity     Worry: Never true     Inability: Never true    Transportation needs     Medical: No     Non-medical: No   Tobacco Use    Smoking status: Former Smoker     Quit date: 10/12/2004     Years since quitting: 15.8    Smokeless tobacco: Never Used   Substance and Sexual Activity    Alcohol use: Yes     Frequency: 2-3 times a week     Drinks per session: 1 or 2     Binge frequency:  Never     Comment: occassionally    Drug use: No    Sexual activity: Yes   Lifestyle    Physical activity     Days per week: 2 days     Minutes per session: 30 min    Stress: Only a little   Relationships    Social connections     Talks on phone: Three times a week     Gets together: Three times a week     Attends [employer] service: Not on file     Active member of club or organization: Yes     Attends meetings of clubs or organizations: More than 4 times per year     Relationship status:    Other Topics Concern    Are you pregnant or think you may be? Not Asked    Breast-feeding Not Asked   Social History Narrative    Not on file        ALLERGIES AND MEDICATIONS: updated and reviewed.  Review of patient's allergies indicates:   Allergen Reactions    Iodinated contrast media        Medication List with Changes/Refills   Current Medications    ASPIRIN (ECOTRIN) 81 MG EC TABLET    Take 1 tablet (81 mg total) by mouth once daily.    FLUTICASONE PROPIONATE (FLONASE) 50 MCG/ACTUATION NASAL SPRAY    1 spray (50 mcg total) by Each Nostril route once daily.    LEVOCETIRIZINE (XYZAL) 5 MG TABLET    Take 1 tablet (5 mg total) by mouth every evening.    LEVOTHYROXINE (SYNTHROID) 75 MCG TABLET    TAKE ONE TABLET BY MOUTH BEFORE BREAKFAST    LOSARTAN (COZAAR) 25 MG TABLET    TAKE 1 TABLET BY MOUTH EVERY DAY    MECLIZINE (ANTIVERT) 12.5 MG TABLET    Take 1 tablet (12.5 mg total) by mouth 3 (three) times daily as needed.       Review of Systems   Constitutional: Negative for fever, malaise/fatigue and weight loss.   HENT: Negative for congestion.    Eyes: Negative for blurred vision and pain.   Respiratory: Negative for shortness of breath and wheezing.    Cardiovascular: Negative for chest pain, palpitations and leg swelling.   Gastrointestinal: Negative for abdominal pain, blood in stool, constipation, diarrhea and heartburn.   Genitourinary: Negative for dysuria, hematuria and urgency.   Neurological: Negative  "for tingling, focal weakness, weakness and headaches.       Objective:   Physical Exam   Vitals:    08/28/20 1507   BP: 120/82   BP Location: Right arm   Patient Position: Sitting   BP Method: Medium (Manual)   Pulse: 78   Temp: 97.7 °F (36.5 °C)   TempSrc: Temporal   SpO2: 96%   Weight: 80.7 kg (178 lb)   Height: 5' 9" (1.753 m)    Body mass index is 26.29 kg/m².  Weight: 80.7 kg (178 lb)   Height: 5' 9" (175.3 cm)     Physical Exam  Constitutional:       Appearance: She is well-developed.   HENT:      Right Ear: Tympanic membrane, ear canal and external ear normal.      Left Ear: Tympanic membrane, ear canal and external ear normal.   Eyes:      General: No scleral icterus.     Pupils: Pupils are equal, round, and reactive to light.   Neck:      Musculoskeletal: Neck supple.      Thyroid: No thyromegaly.   Cardiovascular:      Rate and Rhythm: Normal rate and regular rhythm.      Heart sounds: Normal heart sounds. No murmur.   Pulmonary:      Effort: Pulmonary effort is normal.      Breath sounds: Normal breath sounds. No wheezing.   Abdominal:      Palpations: Abdomen is soft. There is no hepatomegaly, splenomegaly or mass.      Tenderness: There is no abdominal tenderness.   Musculoskeletal: Normal range of motion.         General: No deformity.      Right lower leg: No edema.      Left lower leg: No edema.   Lymphadenopathy:      Cervical: No cervical adenopathy.   Skin:     General: Skin is warm and dry.      Findings: No rash.      Comments: On exposed skin   Neurological:      Mental Status: She is alert and oriented to person, place, and time.      Deep Tendon Reflexes: Reflexes are normal and symmetric.   Psychiatric:         Behavior: Behavior normal.         Thought Content: Thought content normal.         Judgment: Judgment normal.         Component      Latest Ref Rng & Units 8/20/2020 9/12/2018   WBC      3.90 - 12.70 K/uL  6.20   RBC      4.00 - 5.40 M/uL  4.63   Hemoglobin      12.0 - 16.0 g/dL  " 14.3   Hematocrit      37.0 - 48.5 %  46.0   MCV      82 - 98 fL  99 (H)   MCH      27.0 - 31.0 pg  30.9   MCHC      32.0 - 36.0 g/dL  31.1 (L)   RDW      11.5 - 14.5 %  13.0   Platelets      150 - 350 K/uL  253   MPV      9.2 - 12.9 fL  10.1   Immature Granulocytes      0.0 - 0.5 %  0.2   Gran # (ANC)      1.8 - 7.7 K/uL  3.1   Immature Grans (Abs)      0.00 - 0.04 K/uL  0.01   Lymph #      1.0 - 4.8 K/uL  2.4   Mono #      0.3 - 1.0 K/uL  0.5   Eos #      0.0 - 0.5 K/uL  0.2   Baso #      0.00 - 0.20 K/uL  0.02   nRBC      0 /100 WBC  0   Gran%      38.0 - 73.0 %  50.5   Lymph%      18.0 - 48.0 %  38.9   Mono%      4.0 - 15.0 %  7.7   Eosinophil%      0.0 - 8.0 %  2.4   Basophil%      0.0 - 1.9 %  0.3   Differential Method        Automated   Sodium      136 - 145 mmol/L 142 139   Potassium      3.5 - 5.1 mmol/L 4.4 4.5   Chloride      95 - 110 mmol/L 108 105   CO2      23 - 29 mmol/L 24 27   Glucose      70 - 110 mg/dL 89 80   BUN, Bld      8 - 23 mg/dL 23 16   Creatinine      0.5 - 1.4 mg/dL 0.9 0.9   Calcium      8.7 - 10.5 mg/dL 9.7 9.8   PROTEIN TOTAL      6.0 - 8.4 g/dL 7.0 7.4   Albumin      3.5 - 5.2 g/dL 4.1 4.1   BILIRUBIN TOTAL      0.1 - 1.0 mg/dL 0.4 0.8   Alkaline Phosphatase      55 - 135 U/L 68 71   AST      10 - 40 U/L 25 25   ALT      10 - 44 U/L 20 19   Anion Gap      8 - 16 mmol/L 10 7 (L)   eGFR if African American      >60 mL/min/1.73 m:2 >60.0 >60.0   eGFR if non African American      >60 mL/min/1.73 m:2 >60.0 >60.0   Cholesterol      120 - 199 mg/dL 151    Triglycerides      30 - 150 mg/dL 83    HDL      40 - 75 mg/dL 47    LDL Cholesterol External      63.0 - 159.0 mg/dL 87.4    Hdl/Cholesterol Ratio      20.0 - 50.0 % 31.1    Total Cholesterol/HDL Ratio      2.0 - 5.0 3.2    Non-HDL Cholesterol      mg/dL 104    TSH      0.400 - 4.000 uIU/mL 2.010 2.776

## 2020-08-28 ENCOUNTER — OFFICE VISIT (OUTPATIENT)
Dept: FAMILY MEDICINE | Facility: CLINIC | Age: 65
End: 2020-08-28
Payer: MEDICARE

## 2020-08-28 VITALS
DIASTOLIC BLOOD PRESSURE: 82 MMHG | OXYGEN SATURATION: 96 % | HEIGHT: 69 IN | HEART RATE: 78 BPM | SYSTOLIC BLOOD PRESSURE: 120 MMHG | BODY MASS INDEX: 26.36 KG/M2 | WEIGHT: 178 LBS | TEMPERATURE: 98 F

## 2020-08-28 DIAGNOSIS — Z00.00 NORMAL PHYSICAL EXAM: Primary | ICD-10-CM

## 2020-08-28 DIAGNOSIS — E06.3 HYPOTHYROIDISM DUE TO HASHIMOTO'S THYROIDITIS: Chronic | ICD-10-CM

## 2020-08-28 DIAGNOSIS — E03.8 HYPOTHYROIDISM DUE TO HASHIMOTO'S THYROIDITIS: Chronic | ICD-10-CM

## 2020-08-28 DIAGNOSIS — Z12.31 ENCOUNTER FOR SCREENING MAMMOGRAM FOR MALIGNANT NEOPLASM OF BREAST: ICD-10-CM

## 2020-08-28 DIAGNOSIS — I10 ESSENTIAL HYPERTENSION: ICD-10-CM

## 2020-08-28 DIAGNOSIS — Z23 NEED FOR VACCINATION FOR STREP PNEUMONIAE: ICD-10-CM

## 2020-08-28 DIAGNOSIS — G43.909 MIGRAINE SYNDROME: Chronic | ICD-10-CM

## 2020-08-28 DIAGNOSIS — Z23 NEED FOR SHINGLES VACCINE: ICD-10-CM

## 2020-08-28 DIAGNOSIS — D12.6 TUBULAR ADENOMA OF COLON: ICD-10-CM

## 2020-08-28 PROCEDURE — 3074F PR MOST RECENT SYSTOLIC BLOOD PRESSURE < 130 MM HG: ICD-10-PCS | Mod: CPTII,S$GLB,, | Performed by: INTERNAL MEDICINE

## 2020-08-28 PROCEDURE — 3074F SYST BP LT 130 MM HG: CPT | Mod: CPTII,S$GLB,, | Performed by: INTERNAL MEDICINE

## 2020-08-28 PROCEDURE — G0009 PNEUMOCOCCAL POLYSACCHARIDE VACCINE 23-VALENT =>2YO SQ IM: ICD-10-PCS | Mod: 59,S$GLB,, | Performed by: INTERNAL MEDICINE

## 2020-08-28 PROCEDURE — 90471 ZOSTER RECOMBINANT VACCINE: ICD-10-PCS | Mod: S$GLB,,, | Performed by: INTERNAL MEDICINE

## 2020-08-28 PROCEDURE — 99999 PR PBB SHADOW E&M-EST. PATIENT-LVL IV: ICD-10-PCS | Mod: PBBFAC,,, | Performed by: INTERNAL MEDICINE

## 2020-08-28 PROCEDURE — 90732 PNEUMOCOCCAL POLYSACCHARIDE VACCINE 23-VALENT =>2YO SQ IM: ICD-10-PCS | Mod: S$GLB,,, | Performed by: INTERNAL MEDICINE

## 2020-08-28 PROCEDURE — 99397 PER PM REEVAL EST PAT 65+ YR: CPT | Mod: 25,S$GLB,, | Performed by: INTERNAL MEDICINE

## 2020-08-28 PROCEDURE — G0009 ADMIN PNEUMOCOCCAL VACCINE: HCPCS | Mod: 59,S$GLB,, | Performed by: INTERNAL MEDICINE

## 2020-08-28 PROCEDURE — 99999 PR PBB SHADOW E&M-EST. PATIENT-LVL IV: CPT | Mod: PBBFAC,,, | Performed by: INTERNAL MEDICINE

## 2020-08-28 PROCEDURE — 90750 HZV VACC RECOMBINANT IM: CPT | Mod: S$GLB,,, | Performed by: INTERNAL MEDICINE

## 2020-08-28 PROCEDURE — 90471 IMMUNIZATION ADMIN: CPT | Mod: S$GLB,,, | Performed by: INTERNAL MEDICINE

## 2020-08-28 PROCEDURE — 99397 PR PREVENTIVE VISIT,EST,65 & OVER: ICD-10-PCS | Mod: 25,S$GLB,, | Performed by: INTERNAL MEDICINE

## 2020-08-28 PROCEDURE — 90732 PPSV23 VACC 2 YRS+ SUBQ/IM: CPT | Mod: S$GLB,,, | Performed by: INTERNAL MEDICINE

## 2020-08-28 PROCEDURE — 90750 ZOSTER RECOMBINANT VACCINE: ICD-10-PCS | Mod: S$GLB,,, | Performed by: INTERNAL MEDICINE

## 2020-08-28 PROCEDURE — 3079F DIAST BP 80-89 MM HG: CPT | Mod: CPTII,S$GLB,, | Performed by: INTERNAL MEDICINE

## 2020-08-28 PROCEDURE — 3079F PR MOST RECENT DIASTOLIC BLOOD PRESSURE 80-89 MM HG: ICD-10-PCS | Mod: CPTII,S$GLB,, | Performed by: INTERNAL MEDICINE

## 2020-08-28 RX ORDER — LOSARTAN POTASSIUM 25 MG/1
25 TABLET ORAL DAILY
Qty: 90 TABLET | Refills: 3 | Status: SHIPPED | OUTPATIENT
Start: 2020-08-28 | End: 2021-08-29

## 2020-08-28 RX ORDER — LEVOTHYROXINE SODIUM 75 UG/1
75 TABLET ORAL
Qty: 90 TABLET | Refills: 3 | Status: SHIPPED | OUTPATIENT
Start: 2020-08-28 | End: 2021-08-29

## 2020-09-03 ENCOUNTER — HOSPITAL ENCOUNTER (OUTPATIENT)
Dept: RADIOLOGY | Facility: HOSPITAL | Age: 65
Discharge: HOME | End: 2020-09-03
Attending: INTERNAL MEDICINE
Payer: MEDICARE

## 2020-09-03 DIAGNOSIS — Z12.31 ENCOUNTER FOR SCREENING MAMMOGRAM FOR MALIGNANT NEOPLASM OF BREAST: ICD-10-CM

## 2020-09-03 PROCEDURE — 77067 MAMMO DIGITAL SCREENING BILAT WITH TOMOSYNTHESIS_CAD: ICD-10-PCS | Mod: 26,,, | Performed by: RADIOLOGY

## 2020-09-03 PROCEDURE — 77067 SCR MAMMO BI INCL CAD: CPT | Mod: TC,PO

## 2020-09-03 PROCEDURE — 77063 MAMMO DIGITAL SCREENING BILAT WITH TOMOSYNTHESIS_CAD: ICD-10-PCS | Mod: 26,,, | Performed by: RADIOLOGY

## 2020-09-03 PROCEDURE — 77063 BREAST TOMOSYNTHESIS BI: CPT | Mod: 26,,, | Performed by: RADIOLOGY

## 2020-09-03 PROCEDURE — 77067 SCR MAMMO BI INCL CAD: CPT | Mod: 26,,, | Performed by: RADIOLOGY

## 2021-01-05 ENCOUNTER — PATIENT MESSAGE (OUTPATIENT)
Dept: ADMINISTRATIVE | Facility: OTHER | Age: 66
End: 2021-01-05

## 2021-01-05 ENCOUNTER — CLINICAL SUPPORT (OUTPATIENT)
Dept: URGENT CARE | Facility: CLINIC | Age: 66
End: 2021-01-05
Payer: MEDICARE

## 2021-01-05 VITALS
OXYGEN SATURATION: 99 % | WEIGHT: 178 LBS | DIASTOLIC BLOOD PRESSURE: 86 MMHG | BODY MASS INDEX: 26.29 KG/M2 | TEMPERATURE: 97 F | HEART RATE: 78 BPM | SYSTOLIC BLOOD PRESSURE: 131 MMHG

## 2021-01-05 DIAGNOSIS — Z11.59 ENCOUNTER FOR SCREENING FOR OTHER VIRAL DISEASES: Primary | ICD-10-CM

## 2021-01-05 DIAGNOSIS — U07.1 COVID-19 VIRUS INFECTION: ICD-10-CM

## 2021-01-05 LAB
CTP QC/QA: YES
SARS-COV-2 RDRP RESP QL NAA+PROBE: POSITIVE

## 2021-01-05 PROCEDURE — U0002 COVID-19 LAB TEST NON-CDC: HCPCS | Mod: QW,S$GLB,, | Performed by: PHYSICIAN ASSISTANT

## 2021-01-05 PROCEDURE — 99214 PR OFFICE/OUTPT VISIT, EST, LEVL IV, 30-39 MIN: ICD-10-PCS | Mod: S$GLB,,, | Performed by: PHYSICIAN ASSISTANT

## 2021-01-05 PROCEDURE — U0002: ICD-10-PCS | Mod: QW,S$GLB,, | Performed by: PHYSICIAN ASSISTANT

## 2021-01-05 PROCEDURE — 99214 OFFICE O/P EST MOD 30 MIN: CPT | Mod: S$GLB,,, | Performed by: PHYSICIAN ASSISTANT

## 2021-01-06 ENCOUNTER — PATIENT MESSAGE (OUTPATIENT)
Dept: ADMINISTRATIVE | Facility: OTHER | Age: 66
End: 2021-01-06

## 2021-01-06 ENCOUNTER — NURSE TRIAGE (OUTPATIENT)
Dept: ADMINISTRATIVE | Facility: CLINIC | Age: 66
End: 2021-01-06

## 2021-01-07 ENCOUNTER — PATIENT MESSAGE (OUTPATIENT)
Dept: ADMINISTRATIVE | Facility: OTHER | Age: 66
End: 2021-01-07

## 2021-01-08 ENCOUNTER — PATIENT MESSAGE (OUTPATIENT)
Dept: ADMINISTRATIVE | Facility: OTHER | Age: 66
End: 2021-01-08

## 2021-01-12 ENCOUNTER — TELEPHONE (OUTPATIENT)
Dept: ADMINISTRATIVE | Facility: OTHER | Age: 66
End: 2021-01-12

## 2021-01-12 ENCOUNTER — INFUSION (OUTPATIENT)
Dept: INFECTIOUS DISEASES | Facility: HOSPITAL | Age: 66
End: 2021-01-12
Attending: INTERNAL MEDICINE
Payer: MEDICARE

## 2021-01-12 VITALS
RESPIRATION RATE: 18 BRPM | OXYGEN SATURATION: 99 % | HEART RATE: 63 BPM | HEIGHT: 69 IN | SYSTOLIC BLOOD PRESSURE: 115 MMHG | TEMPERATURE: 99 F | BODY MASS INDEX: 24.88 KG/M2 | DIASTOLIC BLOOD PRESSURE: 76 MMHG | WEIGHT: 168 LBS

## 2021-01-12 DIAGNOSIS — U07.1 COVID-19: Primary | ICD-10-CM

## 2021-01-12 PROCEDURE — 63600175 PHARM REV CODE 636 W HCPCS: Performed by: INTERNAL MEDICINE

## 2021-01-12 PROCEDURE — 25000003 PHARM REV CODE 250: Performed by: INTERNAL MEDICINE

## 2021-01-12 PROCEDURE — M0239 BAMLANIVIMAB-XXXX INFUSION: HCPCS | Performed by: INTERNAL MEDICINE

## 2021-01-12 RX ORDER — ACETAMINOPHEN 325 MG/1
650 TABLET ORAL ONCE AS NEEDED
Status: DISCONTINUED | OUTPATIENT
Start: 2021-01-12 | End: 2023-01-26

## 2021-01-12 RX ORDER — DIPHENHYDRAMINE HYDROCHLORIDE 50 MG/ML
25 INJECTION INTRAMUSCULAR; INTRAVENOUS ONCE AS NEEDED
Status: DISCONTINUED | OUTPATIENT
Start: 2021-01-12 | End: 2021-02-03

## 2021-01-12 RX ORDER — EPINEPHRINE 0.1 MG/ML
0.3 INJECTION INTRAVENOUS
Status: DISCONTINUED | OUTPATIENT
Start: 2021-01-12 | End: 2021-02-03

## 2021-01-12 RX ORDER — ALBUTEROL SULFATE 90 UG/1
2 AEROSOL, METERED RESPIRATORY (INHALATION)
Status: DISCONTINUED | OUTPATIENT
Start: 2021-01-12 | End: 2021-02-03

## 2021-01-12 RX ORDER — SODIUM CHLORIDE 0.9 % (FLUSH) 0.9 %
10 SYRINGE (ML) INJECTION
Status: DISCONTINUED | OUTPATIENT
Start: 2021-01-12 | End: 2021-02-03

## 2021-01-12 RX ORDER — ONDANSETRON 4 MG/1
4 TABLET, ORALLY DISINTEGRATING ORAL ONCE AS NEEDED
Status: DISCONTINUED | OUTPATIENT
Start: 2021-01-12 | End: 2021-02-03

## 2021-01-12 RX ADMIN — SODIUM CHLORIDE 700 MG: 0.9 INJECTION, SOLUTION INTRAVENOUS at 08:01

## 2021-01-13 ENCOUNTER — PATIENT MESSAGE (OUTPATIENT)
Dept: ADMINISTRATIVE | Facility: CLINIC | Age: 66
End: 2021-01-13

## 2021-01-13 ENCOUNTER — PATIENT MESSAGE (OUTPATIENT)
Dept: ADMINISTRATIVE | Facility: OTHER | Age: 66
End: 2021-01-13

## 2021-01-14 ENCOUNTER — PATIENT MESSAGE (OUTPATIENT)
Dept: ADMINISTRATIVE | Facility: OTHER | Age: 66
End: 2021-01-14

## 2021-01-14 ENCOUNTER — PATIENT MESSAGE (OUTPATIENT)
Dept: ADMINISTRATIVE | Facility: CLINIC | Age: 66
End: 2021-01-14

## 2021-01-15 ENCOUNTER — NURSE TRIAGE (OUTPATIENT)
Dept: ADMINISTRATIVE | Facility: CLINIC | Age: 66
End: 2021-01-15

## 2021-01-15 ENCOUNTER — PATIENT MESSAGE (OUTPATIENT)
Dept: ADMINISTRATIVE | Facility: CLINIC | Age: 66
End: 2021-01-15

## 2021-01-28 ENCOUNTER — CLINICAL SUPPORT (OUTPATIENT)
Dept: FAMILY MEDICINE | Facility: CLINIC | Age: 66
End: 2021-01-28
Payer: MEDICARE

## 2021-01-28 DIAGNOSIS — Z23 NEED FOR SHINGLES VACCINE: Primary | ICD-10-CM

## 2021-01-28 PROCEDURE — 99499 NO LOS: ICD-10-PCS | Mod: S$GLB,,, | Performed by: INTERNAL MEDICINE

## 2021-01-28 PROCEDURE — 90750 ZOSTER RECOMBINANT VACCINE: ICD-10-PCS | Mod: S$GLB,,, | Performed by: INTERNAL MEDICINE

## 2021-01-28 PROCEDURE — 90471 IMMUNIZATION ADMIN: CPT | Mod: S$GLB,,, | Performed by: INTERNAL MEDICINE

## 2021-01-28 PROCEDURE — 90471 ZOSTER RECOMBINANT VACCINE: ICD-10-PCS | Mod: S$GLB,,, | Performed by: INTERNAL MEDICINE

## 2021-01-28 PROCEDURE — 99499 UNLISTED E&M SERVICE: CPT | Mod: S$GLB,,, | Performed by: INTERNAL MEDICINE

## 2021-01-28 PROCEDURE — 90750 HZV VACC RECOMBINANT IM: CPT | Mod: S$GLB,,, | Performed by: INTERNAL MEDICINE

## 2021-02-03 ENCOUNTER — OFFICE VISIT (OUTPATIENT)
Dept: FAMILY MEDICINE | Facility: CLINIC | Age: 66
End: 2021-02-03
Payer: MEDICARE

## 2021-02-03 ENCOUNTER — LAB VISIT (OUTPATIENT)
Dept: LAB | Facility: HOSPITAL | Age: 66
End: 2021-02-03
Attending: INTERNAL MEDICINE
Payer: MEDICARE

## 2021-02-03 VITALS
OXYGEN SATURATION: 97 % | HEIGHT: 69 IN | WEIGHT: 165.81 LBS | RESPIRATION RATE: 16 BRPM | BODY MASS INDEX: 24.56 KG/M2 | SYSTOLIC BLOOD PRESSURE: 120 MMHG | TEMPERATURE: 98 F | HEART RATE: 59 BPM | DIASTOLIC BLOOD PRESSURE: 70 MMHG

## 2021-02-03 DIAGNOSIS — R00.2 PALPITATIONS: Primary | ICD-10-CM

## 2021-02-03 DIAGNOSIS — U07.1 COVID-19 VIRUS DETECTED: ICD-10-CM

## 2021-02-03 DIAGNOSIS — I10 ESSENTIAL HYPERTENSION: ICD-10-CM

## 2021-02-03 DIAGNOSIS — Z00.00 NORMAL PHYSICAL EXAM: ICD-10-CM

## 2021-02-03 DIAGNOSIS — E06.3 HYPOTHYROIDISM DUE TO HASHIMOTO'S THYROIDITIS: ICD-10-CM

## 2021-02-03 DIAGNOSIS — E03.8 HYPOTHYROIDISM DUE TO HASHIMOTO'S THYROIDITIS: ICD-10-CM

## 2021-02-03 LAB
BASOPHILS # BLD AUTO: 0.02 K/UL (ref 0–0.2)
BASOPHILS NFR BLD: 0.3 % (ref 0–1.9)
DIFFERENTIAL METHOD: ABNORMAL
EOSINOPHIL # BLD AUTO: 0.1 K/UL (ref 0–0.5)
EOSINOPHIL NFR BLD: 2 % (ref 0–8)
ERYTHROCYTE [DISTWIDTH] IN BLOOD BY AUTOMATED COUNT: 13.2 % (ref 11.5–14.5)
HCT VFR BLD AUTO: 44.7 % (ref 37–48.5)
HGB BLD-MCNC: 14.1 G/DL (ref 12–16)
IMM GRANULOCYTES # BLD AUTO: 0.01 K/UL (ref 0–0.04)
IMM GRANULOCYTES NFR BLD AUTO: 0.2 % (ref 0–0.5)
LYMPHOCYTES # BLD AUTO: 2 K/UL (ref 1–4.8)
LYMPHOCYTES NFR BLD: 34.6 % (ref 18–48)
MCH RBC QN AUTO: 31 PG (ref 27–31)
MCHC RBC AUTO-ENTMCNC: 31.5 G/DL (ref 32–36)
MCV RBC AUTO: 98 FL (ref 82–98)
MONOCYTES # BLD AUTO: 0.5 K/UL (ref 0.3–1)
MONOCYTES NFR BLD: 7.6 % (ref 4–15)
NEUTROPHILS # BLD AUTO: 3.3 K/UL (ref 1.8–7.7)
NEUTROPHILS NFR BLD: 55.3 % (ref 38–73)
NRBC BLD-RTO: 0 /100 WBC
PLATELET # BLD AUTO: 219 K/UL (ref 150–350)
PMV BLD AUTO: 10.9 FL (ref 9.2–12.9)
RBC # BLD AUTO: 4.55 M/UL (ref 4–5.4)
WBC # BLD AUTO: 5.89 K/UL (ref 3.9–12.7)

## 2021-02-03 PROCEDURE — 99999 PR PBB SHADOW E&M-EST. PATIENT-LVL IV: CPT | Mod: PBBFAC,,, | Performed by: INTERNAL MEDICINE

## 2021-02-03 PROCEDURE — 3074F SYST BP LT 130 MM HG: CPT | Mod: CPTII,S$GLB,, | Performed by: INTERNAL MEDICINE

## 2021-02-03 PROCEDURE — 99214 OFFICE O/P EST MOD 30 MIN: CPT | Mod: S$GLB,,, | Performed by: INTERNAL MEDICINE

## 2021-02-03 PROCEDURE — 99214 PR OFFICE/OUTPT VISIT, EST, LEVL IV, 30-39 MIN: ICD-10-PCS | Mod: S$GLB,,, | Performed by: INTERNAL MEDICINE

## 2021-02-03 PROCEDURE — 1126F AMNT PAIN NOTED NONE PRSNT: CPT | Mod: S$GLB,,, | Performed by: INTERNAL MEDICINE

## 2021-02-03 PROCEDURE — 84443 ASSAY THYROID STIM HORMONE: CPT

## 2021-02-03 PROCEDURE — 1159F PR MEDICATION LIST DOCUMENTED IN MEDICAL RECORD: ICD-10-PCS | Mod: S$GLB,,, | Performed by: INTERNAL MEDICINE

## 2021-02-03 PROCEDURE — 80053 COMPREHEN METABOLIC PANEL: CPT

## 2021-02-03 PROCEDURE — 3078F DIAST BP <80 MM HG: CPT | Mod: CPTII,S$GLB,, | Performed by: INTERNAL MEDICINE

## 2021-02-03 PROCEDURE — 1159F MED LIST DOCD IN RCRD: CPT | Mod: S$GLB,,, | Performed by: INTERNAL MEDICINE

## 2021-02-03 PROCEDURE — 1126F PR PAIN SEVERITY QUANTIFIED, NO PAIN PRESENT: ICD-10-PCS | Mod: S$GLB,,, | Performed by: INTERNAL MEDICINE

## 2021-02-03 PROCEDURE — 3074F PR MOST RECENT SYSTOLIC BLOOD PRESSURE < 130 MM HG: ICD-10-PCS | Mod: CPTII,S$GLB,, | Performed by: INTERNAL MEDICINE

## 2021-02-03 PROCEDURE — 1101F PR PT FALLS ASSESS DOC 0-1 FALLS W/OUT INJ PAST YR: ICD-10-PCS | Mod: CPTII,S$GLB,, | Performed by: INTERNAL MEDICINE

## 2021-02-03 PROCEDURE — 3008F PR BODY MASS INDEX (BMI) DOCUMENTED: ICD-10-PCS | Mod: CPTII,S$GLB,, | Performed by: INTERNAL MEDICINE

## 2021-02-03 PROCEDURE — 3288F PR FALLS RISK ASSESSMENT DOCUMENTED: ICD-10-PCS | Mod: CPTII,S$GLB,, | Performed by: INTERNAL MEDICINE

## 2021-02-03 PROCEDURE — 3008F BODY MASS INDEX DOCD: CPT | Mod: CPTII,S$GLB,, | Performed by: INTERNAL MEDICINE

## 2021-02-03 PROCEDURE — 93010 ELECTROCARDIOGRAM REPORT: CPT | Mod: S$GLB,,, | Performed by: INTERNAL MEDICINE

## 2021-02-03 PROCEDURE — 80061 LIPID PANEL: CPT

## 2021-02-03 PROCEDURE — 36415 COLL VENOUS BLD VENIPUNCTURE: CPT | Mod: PO

## 2021-02-03 PROCEDURE — 3078F PR MOST RECENT DIASTOLIC BLOOD PRESSURE < 80 MM HG: ICD-10-PCS | Mod: CPTII,S$GLB,, | Performed by: INTERNAL MEDICINE

## 2021-02-03 PROCEDURE — 85025 COMPLETE CBC W/AUTO DIFF WBC: CPT

## 2021-02-03 PROCEDURE — 93010 EKG 12-LEAD: ICD-10-PCS | Mod: S$GLB,,, | Performed by: INTERNAL MEDICINE

## 2021-02-03 PROCEDURE — 1101F PT FALLS ASSESS-DOCD LE1/YR: CPT | Mod: CPTII,S$GLB,, | Performed by: INTERNAL MEDICINE

## 2021-02-03 PROCEDURE — 93005 ELECTROCARDIOGRAM TRACING: CPT | Mod: S$GLB,,, | Performed by: INTERNAL MEDICINE

## 2021-02-03 PROCEDURE — 99999 PR PBB SHADOW E&M-EST. PATIENT-LVL IV: ICD-10-PCS | Mod: PBBFAC,,, | Performed by: INTERNAL MEDICINE

## 2021-02-03 PROCEDURE — 3288F FALL RISK ASSESSMENT DOCD: CPT | Mod: CPTII,S$GLB,, | Performed by: INTERNAL MEDICINE

## 2021-02-03 PROCEDURE — 93005 EKG 12-LEAD: ICD-10-PCS | Mod: S$GLB,,, | Performed by: INTERNAL MEDICINE

## 2021-02-04 LAB
ALBUMIN SERPL BCP-MCNC: 3.9 G/DL (ref 3.5–5.2)
ALP SERPL-CCNC: 72 U/L (ref 55–135)
ALT SERPL W/O P-5'-P-CCNC: 15 U/L (ref 10–44)
ANION GAP SERPL CALC-SCNC: 12 MMOL/L (ref 8–16)
AST SERPL-CCNC: 19 U/L (ref 10–40)
BILIRUB SERPL-MCNC: 0.5 MG/DL (ref 0.1–1)
BUN SERPL-MCNC: 18 MG/DL (ref 8–23)
CALCIUM SERPL-MCNC: 9.5 MG/DL (ref 8.7–10.5)
CHLORIDE SERPL-SCNC: 107 MMOL/L (ref 95–110)
CHOLEST SERPL-MCNC: 186 MG/DL (ref 120–199)
CHOLEST/HDLC SERPL: 3.1 {RATIO} (ref 2–5)
CO2 SERPL-SCNC: 24 MMOL/L (ref 23–29)
CREAT SERPL-MCNC: 0.8 MG/DL (ref 0.5–1.4)
EST. GFR  (AFRICAN AMERICAN): >60 ML/MIN/1.73 M^2
EST. GFR  (NON AFRICAN AMERICAN): >60 ML/MIN/1.73 M^2
GLUCOSE SERPL-MCNC: 83 MG/DL (ref 70–110)
HDLC SERPL-MCNC: 60 MG/DL (ref 40–75)
HDLC SERPL: 32.3 % (ref 20–50)
LDLC SERPL CALC-MCNC: 104.4 MG/DL (ref 63–159)
NONHDLC SERPL-MCNC: 126 MG/DL
POTASSIUM SERPL-SCNC: 4.6 MMOL/L (ref 3.5–5.1)
PROT SERPL-MCNC: 7.1 G/DL (ref 6–8.4)
SODIUM SERPL-SCNC: 143 MMOL/L (ref 136–145)
TRIGL SERPL-MCNC: 108 MG/DL (ref 30–150)
TSH SERPL DL<=0.005 MIU/L-ACNC: 2.18 UIU/ML (ref 0.4–4)

## 2021-02-10 ENCOUNTER — OFFICE VISIT (OUTPATIENT)
Dept: CARDIOLOGY | Facility: CLINIC | Age: 66
End: 2021-02-10
Payer: MEDICARE

## 2021-02-10 VITALS
DIASTOLIC BLOOD PRESSURE: 71 MMHG | BODY MASS INDEX: 24.48 KG/M2 | WEIGHT: 165.25 LBS | OXYGEN SATURATION: 100 % | SYSTOLIC BLOOD PRESSURE: 133 MMHG | HEART RATE: 76 BPM | HEIGHT: 69 IN | RESPIRATION RATE: 18 BRPM

## 2021-02-10 DIAGNOSIS — R00.2 PALPITATIONS: ICD-10-CM

## 2021-02-10 DIAGNOSIS — I10 ESSENTIAL HYPERTENSION: Primary | ICD-10-CM

## 2021-02-10 PROCEDURE — 99204 PR OFFICE/OUTPT VISIT, NEW, LEVL IV, 45-59 MIN: ICD-10-PCS | Mod: S$GLB,,, | Performed by: INTERNAL MEDICINE

## 2021-02-10 PROCEDURE — 99499 RISK ADDL DX/OHS AUDIT: ICD-10-PCS | Mod: S$GLB,,, | Performed by: INTERNAL MEDICINE

## 2021-02-10 PROCEDURE — 3075F PR MOST RECENT SYSTOLIC BLOOD PRESS GE 130-139MM HG: ICD-10-PCS | Mod: CPTII,S$GLB,, | Performed by: INTERNAL MEDICINE

## 2021-02-10 PROCEDURE — 1159F MED LIST DOCD IN RCRD: CPT | Mod: S$GLB,,, | Performed by: INTERNAL MEDICINE

## 2021-02-10 PROCEDURE — 3008F PR BODY MASS INDEX (BMI) DOCUMENTED: ICD-10-PCS | Mod: CPTII,S$GLB,, | Performed by: INTERNAL MEDICINE

## 2021-02-10 PROCEDURE — 1126F PR PAIN SEVERITY QUANTIFIED, NO PAIN PRESENT: ICD-10-PCS | Mod: S$GLB,,, | Performed by: INTERNAL MEDICINE

## 2021-02-10 PROCEDURE — 1101F PT FALLS ASSESS-DOCD LE1/YR: CPT | Mod: CPTII,S$GLB,, | Performed by: INTERNAL MEDICINE

## 2021-02-10 PROCEDURE — 1126F AMNT PAIN NOTED NONE PRSNT: CPT | Mod: S$GLB,,, | Performed by: INTERNAL MEDICINE

## 2021-02-10 PROCEDURE — 3288F FALL RISK ASSESSMENT DOCD: CPT | Mod: CPTII,S$GLB,, | Performed by: INTERNAL MEDICINE

## 2021-02-10 PROCEDURE — 1159F PR MEDICATION LIST DOCUMENTED IN MEDICAL RECORD: ICD-10-PCS | Mod: S$GLB,,, | Performed by: INTERNAL MEDICINE

## 2021-02-10 PROCEDURE — 3078F PR MOST RECENT DIASTOLIC BLOOD PRESSURE < 80 MM HG: ICD-10-PCS | Mod: CPTII,S$GLB,, | Performed by: INTERNAL MEDICINE

## 2021-02-10 PROCEDURE — 3288F PR FALLS RISK ASSESSMENT DOCUMENTED: ICD-10-PCS | Mod: CPTII,S$GLB,, | Performed by: INTERNAL MEDICINE

## 2021-02-10 PROCEDURE — 3008F BODY MASS INDEX DOCD: CPT | Mod: CPTII,S$GLB,, | Performed by: INTERNAL MEDICINE

## 2021-02-10 PROCEDURE — 3078F DIAST BP <80 MM HG: CPT | Mod: CPTII,S$GLB,, | Performed by: INTERNAL MEDICINE

## 2021-02-10 PROCEDURE — 99204 OFFICE O/P NEW MOD 45 MIN: CPT | Mod: S$GLB,,, | Performed by: INTERNAL MEDICINE

## 2021-02-10 PROCEDURE — 1101F PR PT FALLS ASSESS DOC 0-1 FALLS W/OUT INJ PAST YR: ICD-10-PCS | Mod: CPTII,S$GLB,, | Performed by: INTERNAL MEDICINE

## 2021-02-10 PROCEDURE — 99499 UNLISTED E&M SERVICE: CPT | Mod: S$GLB,,, | Performed by: INTERNAL MEDICINE

## 2021-02-10 PROCEDURE — 99999 PR PBB SHADOW E&M-EST. PATIENT-LVL IV: ICD-10-PCS | Mod: PBBFAC,,, | Performed by: INTERNAL MEDICINE

## 2021-02-10 PROCEDURE — 99999 PR PBB SHADOW E&M-EST. PATIENT-LVL IV: CPT | Mod: PBBFAC,,, | Performed by: INTERNAL MEDICINE

## 2021-02-10 PROCEDURE — 3075F SYST BP GE 130 - 139MM HG: CPT | Mod: CPTII,S$GLB,, | Performed by: INTERNAL MEDICINE

## 2021-02-12 ENCOUNTER — HOSPITAL ENCOUNTER (OUTPATIENT)
Dept: CARDIOLOGY | Facility: HOSPITAL | Age: 66
Discharge: HOME | End: 2021-02-12
Attending: INTERNAL MEDICINE
Payer: MEDICARE

## 2021-02-12 DIAGNOSIS — R00.2 PALPITATIONS: ICD-10-CM

## 2021-02-12 DIAGNOSIS — I10 ESSENTIAL HYPERTENSION: ICD-10-CM

## 2021-02-12 LAB
AORTIC ROOT ANNULUS: 2.8 CM
AORTIC VALVE CUSP SEPERATION: 1.76 CM
ASCENDING AORTA: 2.56 CM
AV INDEX (PROSTH): 0.64
AV MEAN GRADIENT: 4 MMHG
AV PEAK GRADIENT: 7 MMHG
AV VALVE AREA: 2.93 CM2
AV VELOCITY RATIO: 0.74
CV ECHO LV RWT: 0.32 CM
DOP CALC AO PEAK VEL: 1.28 M/S
DOP CALC AO VTI: 30.21 CM
DOP CALC LVOT AREA: 4.6 CM2
DOP CALC LVOT DIAMETER: 2.41 CM
DOP CALC LVOT PEAK VEL: 0.95 M/S
DOP CALC LVOT STROKE VOLUME: 88.63 CM3
DOP CALCLVOT PEAK VEL VTI: 19.44 CM
E WAVE DECELERATION TIME: 260.27 MSEC
E/A RATIO: 1.25
E/E' RATIO: 17.67 M/S
ECHO LV POSTERIOR WALL: 0.71 CM (ref 0.6–1.1)
FRACTIONAL SHORTENING: 38 % (ref 28–44)
INTERVENTRICULAR SEPTUM: 0.77 CM (ref 0.6–1.1)
IVRT: 74.22 MSEC
LA MAJOR: 4.73 CM
LA MINOR: 5.19 CM
LA WIDTH: 4.23 CM
LEFT ATRIUM SIZE: 2.97 CM
LEFT ATRIUM VOLUME: 52.85 CM3
LEFT INTERNAL DIMENSION IN SYSTOLE: 2.77 CM (ref 2.1–4)
LEFT VENTRICLE DIASTOLIC VOLUME: 91.94 ML
LEFT VENTRICLE SYSTOLIC VOLUME: 28.83 ML
LEFT VENTRICULAR INTERNAL DIMENSION IN DIASTOLE: 4.49 CM (ref 3.5–6)
LEFT VENTRICULAR MASS: 102.32 G
LV LATERAL E/E' RATIO: 15.14 M/S
LV SEPTAL E/E' RATIO: 21.2 M/S
MV PEAK A VEL: 0.85 M/S
MV PEAK E VEL: 1.06 M/S
PISA TR MAX VEL: 0.96 M/S
PULM VEIN S/D RATIO: 1.3
PV PEAK D VEL: 0.56 M/S
PV PEAK S VEL: 0.73 M/S
PV PEAK VELOCITY: 0.68 CM/S
RA MAJOR: 4.67 CM
RA WIDTH: 3.33 CM
RIGHT VENTRICULAR END-DIASTOLIC DIMENSION: 3.34 CM
RV TISSUE DOPPLER FREE WALL SYSTOLIC VELOCITY 1 (APICAL 4 CHAMBER VIEW): 10.9 CM/S
SINUS: 3.2 CM
STJ: 2.65 CM
TDI LATERAL: 0.07 M/S
TDI SEPTAL: 0.05 M/S
TDI: 0.06 M/S
TR MAX PG: 4 MMHG
TRICUSPID ANNULAR PLANE SYSTOLIC EXCURSION: 2.33 CM
TV PEAK E VEL: 0.54 M/S

## 2021-02-12 PROCEDURE — 93308 TTE F-UP OR LMTD: CPT | Mod: 26,,, | Performed by: INTERNAL MEDICINE

## 2021-02-12 PROCEDURE — 93321 DOPPLER ECHO F-UP/LMTD STD: CPT | Mod: 26,,, | Performed by: INTERNAL MEDICINE

## 2021-02-12 PROCEDURE — 93325 DOPPLER ECHO COLOR FLOW MAPG: CPT | Mod: 26,,, | Performed by: INTERNAL MEDICINE

## 2021-02-12 PROCEDURE — 93306 TTE W/DOPPLER COMPLETE: CPT

## 2021-02-12 PROCEDURE — 93325 PR DOPPLER COLOR FLOW VELOCITY MAP: ICD-10-PCS | Mod: 26,,, | Performed by: INTERNAL MEDICINE

## 2021-02-12 PROCEDURE — 93321 PR DOPPLER ECHO HEART,LIMITED,F/U: ICD-10-PCS | Mod: 26,,, | Performed by: INTERNAL MEDICINE

## 2021-02-12 PROCEDURE — 93308 ECHO (CUPID ONLY): ICD-10-PCS | Mod: 26,,, | Performed by: INTERNAL MEDICINE

## 2021-02-15 ENCOUNTER — PATIENT MESSAGE (OUTPATIENT)
Dept: FAMILY MEDICINE | Facility: CLINIC | Age: 66
End: 2021-02-15

## 2021-02-18 ENCOUNTER — HOSPITAL ENCOUNTER (OUTPATIENT)
Dept: CARDIOLOGY | Facility: HOSPITAL | Age: 66
Discharge: HOME | End: 2021-02-18
Attending: INTERNAL MEDICINE
Payer: MEDICARE

## 2021-02-18 DIAGNOSIS — R00.2 PALPITATIONS: ICD-10-CM

## 2021-02-18 DIAGNOSIS — I10 ESSENTIAL HYPERTENSION: ICD-10-CM

## 2021-02-18 PROCEDURE — 93225 XTRNL ECG REC<48 HRS REC: CPT

## 2021-02-18 PROCEDURE — 93227 XTRNL ECG REC<48 HR R&I: CPT | Mod: ,,, | Performed by: INTERNAL MEDICINE

## 2021-02-18 PROCEDURE — 93227 HOLTER MONITOR - 24 HOUR (CUPID ONLY): ICD-10-PCS | Mod: ,,, | Performed by: INTERNAL MEDICINE

## 2021-02-23 LAB
OHS CV EVENT MONITOR DAY: 0
OHS CV HOLTER LENGTH DECIMAL HOURS: 23.98
OHS CV HOLTER LENGTH HOURS: 23
OHS CV HOLTER LENGTH MINUTES: 59

## 2021-04-07 ENCOUNTER — OFFICE VISIT (OUTPATIENT)
Dept: DERMATOLOGY | Facility: CLINIC | Age: 66
End: 2021-04-07
Payer: MEDICARE

## 2021-04-07 ENCOUNTER — PATIENT MESSAGE (OUTPATIENT)
Dept: DERMATOLOGY | Facility: CLINIC | Age: 66
End: 2021-04-07

## 2021-04-07 ENCOUNTER — PATIENT OUTREACH (OUTPATIENT)
Dept: ADMINISTRATIVE | Facility: OTHER | Age: 66
End: 2021-04-07

## 2021-04-07 DIAGNOSIS — L82.1 SEBORRHEIC KERATOSIS: ICD-10-CM

## 2021-04-07 DIAGNOSIS — L30.9 DERMATITIS: ICD-10-CM

## 2021-04-07 DIAGNOSIS — L21.9 SEBORRHEIC DERMATITIS OF SCALP: Primary | ICD-10-CM

## 2021-04-07 PROCEDURE — 1101F PR PT FALLS ASSESS DOC 0-1 FALLS W/OUT INJ PAST YR: ICD-10-PCS | Mod: CPTII,S$GLB,, | Performed by: STUDENT IN AN ORGANIZED HEALTH CARE EDUCATION/TRAINING PROGRAM

## 2021-04-07 PROCEDURE — 3288F FALL RISK ASSESSMENT DOCD: CPT | Mod: CPTII,S$GLB,, | Performed by: STUDENT IN AN ORGANIZED HEALTH CARE EDUCATION/TRAINING PROGRAM

## 2021-04-07 PROCEDURE — 99204 OFFICE O/P NEW MOD 45 MIN: CPT | Mod: S$GLB,,, | Performed by: STUDENT IN AN ORGANIZED HEALTH CARE EDUCATION/TRAINING PROGRAM

## 2021-04-07 PROCEDURE — 1159F MED LIST DOCD IN RCRD: CPT | Mod: S$GLB,,, | Performed by: STUDENT IN AN ORGANIZED HEALTH CARE EDUCATION/TRAINING PROGRAM

## 2021-04-07 PROCEDURE — 1101F PT FALLS ASSESS-DOCD LE1/YR: CPT | Mod: CPTII,S$GLB,, | Performed by: STUDENT IN AN ORGANIZED HEALTH CARE EDUCATION/TRAINING PROGRAM

## 2021-04-07 PROCEDURE — 3288F PR FALLS RISK ASSESSMENT DOCUMENTED: ICD-10-PCS | Mod: CPTII,S$GLB,, | Performed by: STUDENT IN AN ORGANIZED HEALTH CARE EDUCATION/TRAINING PROGRAM

## 2021-04-07 PROCEDURE — 99999 PR PBB SHADOW E&M-EST. PATIENT-LVL III: CPT | Mod: PBBFAC,,, | Performed by: STUDENT IN AN ORGANIZED HEALTH CARE EDUCATION/TRAINING PROGRAM

## 2021-04-07 PROCEDURE — 99204 PR OFFICE/OUTPT VISIT, NEW, LEVL IV, 45-59 MIN: ICD-10-PCS | Mod: S$GLB,,, | Performed by: STUDENT IN AN ORGANIZED HEALTH CARE EDUCATION/TRAINING PROGRAM

## 2021-04-07 PROCEDURE — 99999 PR PBB SHADOW E&M-EST. PATIENT-LVL III: ICD-10-PCS | Mod: PBBFAC,,, | Performed by: STUDENT IN AN ORGANIZED HEALTH CARE EDUCATION/TRAINING PROGRAM

## 2021-04-07 PROCEDURE — 1159F PR MEDICATION LIST DOCUMENTED IN MEDICAL RECORD: ICD-10-PCS | Mod: S$GLB,,, | Performed by: STUDENT IN AN ORGANIZED HEALTH CARE EDUCATION/TRAINING PROGRAM

## 2021-04-07 RX ORDER — CLOBETASOL PROPIONATE 0.5 MG/G
CREAM TOPICAL 2 TIMES DAILY
Qty: 45 G | Refills: 0 | Status: SHIPPED | OUTPATIENT
Start: 2021-04-07 | End: 2022-01-25

## 2021-04-07 RX ORDER — FLUOCINONIDE TOPICAL SOLUTION USP, 0.05% 0.5 MG/ML
SOLUTION TOPICAL 2 TIMES DAILY
Qty: 60 ML | Refills: 0 | Status: SHIPPED | OUTPATIENT
Start: 2021-04-07 | End: 2021-08-05

## 2021-04-14 ENCOUNTER — IMMUNIZATION (OUTPATIENT)
Dept: OBSTETRICS AND GYNECOLOGY | Facility: CLINIC | Age: 66
End: 2021-04-14
Payer: MEDICARE

## 2021-04-14 DIAGNOSIS — Z23 NEED FOR VACCINATION: Primary | ICD-10-CM

## 2021-04-14 PROCEDURE — 0001A COVID-19, MRNA, LNP-S, PF, 30 MCG/0.3 ML DOSE VACCINE: ICD-10-PCS | Mod: CV19,S$GLB,, | Performed by: FAMILY MEDICINE

## 2021-04-14 PROCEDURE — 0001A COVID-19, MRNA, LNP-S, PF, 30 MCG/0.3 ML DOSE VACCINE: CPT | Mod: CV19,S$GLB,, | Performed by: FAMILY MEDICINE

## 2021-04-14 PROCEDURE — 91300 COVID-19, MRNA, LNP-S, PF, 30 MCG/0.3 ML DOSE VACCINE: CPT | Mod: S$GLB,,, | Performed by: FAMILY MEDICINE

## 2021-04-14 PROCEDURE — 91300 COVID-19, MRNA, LNP-S, PF, 30 MCG/0.3 ML DOSE VACCINE: ICD-10-PCS | Mod: S$GLB,,, | Performed by: FAMILY MEDICINE

## 2021-05-05 ENCOUNTER — IMMUNIZATION (OUTPATIENT)
Dept: OBSTETRICS AND GYNECOLOGY | Facility: CLINIC | Age: 66
End: 2021-05-05
Payer: MEDICARE

## 2021-05-05 DIAGNOSIS — Z23 NEED FOR VACCINATION: Primary | ICD-10-CM

## 2021-05-05 PROCEDURE — 91300 COVID-19, MRNA, LNP-S, PF, 30 MCG/0.3 ML DOSE VACCINE: ICD-10-PCS | Mod: ,,, | Performed by: NURSE PRACTITIONER

## 2021-05-05 PROCEDURE — 91300 COVID-19, MRNA, LNP-S, PF, 30 MCG/0.3 ML DOSE VACCINE: CPT | Mod: ,,, | Performed by: NURSE PRACTITIONER

## 2021-05-05 PROCEDURE — 0002A COVID-19, MRNA, LNP-S, PF, 30 MCG/0.3 ML DOSE VACCINE: CPT | Mod: CV19,,, | Performed by: NURSE PRACTITIONER

## 2021-05-05 PROCEDURE — 0002A COVID-19, MRNA, LNP-S, PF, 30 MCG/0.3 ML DOSE VACCINE: ICD-10-PCS | Mod: CV19,,, | Performed by: NURSE PRACTITIONER

## 2021-05-10 ENCOUNTER — PATIENT MESSAGE (OUTPATIENT)
Dept: DERMATOLOGY | Facility: CLINIC | Age: 66
End: 2021-05-10

## 2021-08-31 NOTE — PROGRESS NOTES
Labs nl 10 year ASCVD < 7.5% results to pt PEx 1 year. She may have obstructive uropathy. Continue tsai for now. Check BMP. Voiding studies.

## 2021-10-02 ENCOUNTER — PATIENT MESSAGE (OUTPATIENT)
Dept: FAMILY MEDICINE | Facility: CLINIC | Age: 66
End: 2021-10-02

## 2021-10-02 DIAGNOSIS — Z12.31 ENCOUNTER FOR SCREENING MAMMOGRAM FOR BREAST CANCER: Primary | ICD-10-CM

## 2021-10-04 ENCOUNTER — PATIENT MESSAGE (OUTPATIENT)
Dept: ADMINISTRATIVE | Facility: HOSPITAL | Age: 66
End: 2021-10-04

## 2021-11-01 ENCOUNTER — PATIENT OUTREACH (OUTPATIENT)
Dept: ADMINISTRATIVE | Facility: OTHER | Age: 66
End: 2021-11-01
Payer: MEDICARE

## 2021-11-02 ENCOUNTER — OFFICE VISIT (OUTPATIENT)
Dept: OPHTHALMOLOGY | Facility: CLINIC | Age: 66
End: 2021-11-02
Payer: MEDICARE

## 2021-11-02 DIAGNOSIS — H40.053 BILATERAL OCULAR HYPERTENSION: Primary | ICD-10-CM

## 2021-11-02 PROCEDURE — 99999 PR PBB SHADOW E&M-EST. PATIENT-LVL II: ICD-10-PCS | Mod: PBBFAC,,, | Performed by: OPHTHALMOLOGY

## 2021-11-02 PROCEDURE — 1159F MED LIST DOCD IN RCRD: CPT | Mod: CPTII,S$GLB,, | Performed by: OPHTHALMOLOGY

## 2021-11-02 PROCEDURE — 1159F PR MEDICATION LIST DOCUMENTED IN MEDICAL RECORD: ICD-10-PCS | Mod: CPTII,S$GLB,, | Performed by: OPHTHALMOLOGY

## 2021-11-02 PROCEDURE — 99999 PR PBB SHADOW E&M-EST. PATIENT-LVL II: CPT | Mod: PBBFAC,,, | Performed by: OPHTHALMOLOGY

## 2021-11-02 PROCEDURE — 99204 OFFICE O/P NEW MOD 45 MIN: CPT | Mod: S$GLB,,, | Performed by: OPHTHALMOLOGY

## 2021-11-02 PROCEDURE — 4010F ACE/ARB THERAPY RXD/TAKEN: CPT | Mod: CPTII,S$GLB,, | Performed by: OPHTHALMOLOGY

## 2021-11-02 PROCEDURE — 1126F PR PAIN SEVERITY QUANTIFIED, NO PAIN PRESENT: ICD-10-PCS | Mod: CPTII,S$GLB,, | Performed by: OPHTHALMOLOGY

## 2021-11-02 PROCEDURE — 1101F PR PT FALLS ASSESS DOC 0-1 FALLS W/OUT INJ PAST YR: ICD-10-PCS | Mod: CPTII,S$GLB,, | Performed by: OPHTHALMOLOGY

## 2021-11-02 PROCEDURE — 1126F AMNT PAIN NOTED NONE PRSNT: CPT | Mod: CPTII,S$GLB,, | Performed by: OPHTHALMOLOGY

## 2021-11-02 PROCEDURE — 92020 GONIOSCOPY: CPT | Mod: S$GLB,,, | Performed by: OPHTHALMOLOGY

## 2021-11-02 PROCEDURE — 99204 PR OFFICE/OUTPT VISIT, NEW, LEVL IV, 45-59 MIN: ICD-10-PCS | Mod: S$GLB,,, | Performed by: OPHTHALMOLOGY

## 2021-11-02 PROCEDURE — 1160F RVW MEDS BY RX/DR IN RCRD: CPT | Mod: CPTII,S$GLB,, | Performed by: OPHTHALMOLOGY

## 2021-11-02 PROCEDURE — 4010F PR ACE/ARB THEARPY RXD/TAKEN: ICD-10-PCS | Mod: CPTII,S$GLB,, | Performed by: OPHTHALMOLOGY

## 2021-11-02 PROCEDURE — 92020 PR SPECIAL EYE EVAL,GONIOSCOPY: ICD-10-PCS | Mod: S$GLB,,, | Performed by: OPHTHALMOLOGY

## 2021-11-02 PROCEDURE — 3288F PR FALLS RISK ASSESSMENT DOCUMENTED: ICD-10-PCS | Mod: CPTII,S$GLB,, | Performed by: OPHTHALMOLOGY

## 2021-11-02 PROCEDURE — 3288F FALL RISK ASSESSMENT DOCD: CPT | Mod: CPTII,S$GLB,, | Performed by: OPHTHALMOLOGY

## 2021-11-02 PROCEDURE — 1101F PT FALLS ASSESS-DOCD LE1/YR: CPT | Mod: CPTII,S$GLB,, | Performed by: OPHTHALMOLOGY

## 2021-11-02 PROCEDURE — 1160F PR REVIEW ALL MEDS BY PRESCRIBER/CLIN PHARMACIST DOCUMENTED: ICD-10-PCS | Mod: CPTII,S$GLB,, | Performed by: OPHTHALMOLOGY

## 2021-11-02 PROCEDURE — 99499 RISK ADDL DX/OHS AUDIT: ICD-10-PCS | Mod: S$GLB,,, | Performed by: OPHTHALMOLOGY

## 2021-11-02 PROCEDURE — 99499 UNLISTED E&M SERVICE: CPT | Mod: S$GLB,,, | Performed by: OPHTHALMOLOGY

## 2021-11-19 ENCOUNTER — PES CALL (OUTPATIENT)
Dept: ADMINISTRATIVE | Facility: CLINIC | Age: 66
End: 2021-11-19
Payer: MEDICARE

## 2021-12-08 ENCOUNTER — PATIENT MESSAGE (OUTPATIENT)
Dept: ADMINISTRATIVE | Facility: HOSPITAL | Age: 66
End: 2021-12-08
Payer: MEDICARE

## 2022-01-04 ENCOUNTER — OFFICE VISIT (OUTPATIENT)
Dept: OPHTHALMOLOGY | Facility: CLINIC | Age: 67
End: 2022-01-04
Payer: MEDICARE

## 2022-01-04 DIAGNOSIS — H40.1131 PRIMARY OPEN-ANGLE GLAUCOMA, BILATERAL, MILD STAGE: Primary | ICD-10-CM

## 2022-01-04 PROCEDURE — 1159F MED LIST DOCD IN RCRD: CPT | Mod: CPTII,S$GLB,, | Performed by: OPHTHALMOLOGY

## 2022-01-04 PROCEDURE — 76514 ECHO EXAM OF EYE THICKNESS: CPT | Mod: S$GLB,,, | Performed by: OPHTHALMOLOGY

## 2022-01-04 PROCEDURE — 1159F PR MEDICATION LIST DOCUMENTED IN MEDICAL RECORD: ICD-10-PCS | Mod: CPTII,S$GLB,, | Performed by: OPHTHALMOLOGY

## 2022-01-04 PROCEDURE — 99999 PR PBB SHADOW E&M-EST. PATIENT-LVL II: ICD-10-PCS | Mod: PBBFAC,,, | Performed by: OPHTHALMOLOGY

## 2022-01-04 PROCEDURE — 99999 PR PBB SHADOW E&M-EST. PATIENT-LVL II: CPT | Mod: PBBFAC,,, | Performed by: OPHTHALMOLOGY

## 2022-01-04 PROCEDURE — 92020 GONIOSCOPY: CPT | Mod: S$GLB,,, | Performed by: OPHTHALMOLOGY

## 2022-01-04 PROCEDURE — 76514 PR  US, EYE, FOR CORNEAL THICKNESS: ICD-10-PCS | Mod: S$GLB,,, | Performed by: OPHTHALMOLOGY

## 2022-01-04 PROCEDURE — 92133 CPTRZD OPH DX IMG PST SGM ON: CPT | Mod: S$GLB,,, | Performed by: OPHTHALMOLOGY

## 2022-01-04 PROCEDURE — 1160F PR REVIEW ALL MEDS BY PRESCRIBER/CLIN PHARMACIST DOCUMENTED: ICD-10-PCS | Mod: CPTII,S$GLB,, | Performed by: OPHTHALMOLOGY

## 2022-01-04 PROCEDURE — 1126F AMNT PAIN NOTED NONE PRSNT: CPT | Mod: CPTII,S$GLB,, | Performed by: OPHTHALMOLOGY

## 2022-01-04 PROCEDURE — 99214 PR OFFICE/OUTPT VISIT, EST, LEVL IV, 30-39 MIN: ICD-10-PCS | Mod: S$GLB,,, | Performed by: OPHTHALMOLOGY

## 2022-01-04 PROCEDURE — 1160F RVW MEDS BY RX/DR IN RCRD: CPT | Mod: CPTII,S$GLB,, | Performed by: OPHTHALMOLOGY

## 2022-01-04 PROCEDURE — 1126F PR PAIN SEVERITY QUANTIFIED, NO PAIN PRESENT: ICD-10-PCS | Mod: CPTII,S$GLB,, | Performed by: OPHTHALMOLOGY

## 2022-01-04 PROCEDURE — 92020 PR SPECIAL EYE EVAL,GONIOSCOPY: ICD-10-PCS | Mod: S$GLB,,, | Performed by: OPHTHALMOLOGY

## 2022-01-04 PROCEDURE — 92133 POSTERIOR SEGMENT OCT OPTIC NERVE(OCULAR COHERENCE TOMOGRAPHY) - OU - BOTH EYES: ICD-10-PCS | Mod: S$GLB,,, | Performed by: OPHTHALMOLOGY

## 2022-01-04 PROCEDURE — 99214 OFFICE O/P EST MOD 30 MIN: CPT | Mod: S$GLB,,, | Performed by: OPHTHALMOLOGY

## 2022-01-04 RX ORDER — KETOROLAC TROMETHAMINE 5 MG/ML
1 SOLUTION OPHTHALMIC 3 TIMES DAILY
Qty: 5 ML | Refills: 1 | Status: SHIPPED | OUTPATIENT
Start: 2022-01-04 | End: 2022-01-07

## 2022-01-04 RX ORDER — ACETAZOLAMIDE 500 MG/1
500 CAPSULE, EXTENDED RELEASE ORAL ONCE
Qty: 1 CAPSULE | Refills: 0 | Status: SHIPPED | OUTPATIENT
Start: 2022-01-04 | End: 2022-01-25

## 2022-01-04 NOTE — PROGRESS NOTES
HPI     Ocular Hypertension      Additional comments: Bilateral              Comments     65 y/o female presents for 2 month IOP Check/Pachy/OCT Nerve.   Patient notes no change from previous exam.            Last edited by Jennifer High on 1/4/2022 11:11 AM. (History)            Assessment /Plan     For exam results, see Encounter Report.    Primary open-angle glaucoma, bilateral, mild stage      Unknown famhx  Thin pachy    Very high IOP OD>OS tmax 38/28  Optic nerves look healthy yet asymmetry OD>OS  Substantial OCT progression OD>OS  New dx of glaucoma made today    Discussed options, including SLT vs eye drops  Pt elects SLT    Schedule SLT OD then OS  Will Rx diamox x 1 to use morning of SLT OD  Will rx ketorolac for use after SLT OU    Target IOP low 20s OU

## 2022-01-05 ENCOUNTER — PATIENT OUTREACH (OUTPATIENT)
Dept: ADMINISTRATIVE | Facility: OTHER | Age: 67
End: 2022-01-05
Payer: MEDICARE

## 2022-01-05 NOTE — PROGRESS NOTES
Care Everywhere:   Immunization: updated  Health Maintenance: updated  Media Review:   Legacy Review:   DIS:  Order placed:   Upcoming appts:mammogram 1.6.2022  EFAX:  Task Tickets:  Referrals:

## 2022-01-06 ENCOUNTER — HOSPITAL ENCOUNTER (OUTPATIENT)
Dept: RADIOLOGY | Facility: HOSPITAL | Age: 67
Discharge: HOME | End: 2022-01-06
Attending: INTERNAL MEDICINE
Payer: MEDICARE

## 2022-01-06 DIAGNOSIS — Z12.31 ENCOUNTER FOR SCREENING MAMMOGRAM FOR BREAST CANCER: ICD-10-CM

## 2022-01-06 PROCEDURE — 77063 MAMMO DIGITAL SCREENING BILAT WITH TOMO: ICD-10-PCS | Mod: 26,,, | Performed by: RADIOLOGY

## 2022-01-06 PROCEDURE — 77067 MAMMO DIGITAL SCREENING BILAT WITH TOMO: ICD-10-PCS | Mod: 26,,, | Performed by: RADIOLOGY

## 2022-01-06 PROCEDURE — 77063 BREAST TOMOSYNTHESIS BI: CPT | Mod: 26,,, | Performed by: RADIOLOGY

## 2022-01-06 PROCEDURE — 77067 SCR MAMMO BI INCL CAD: CPT | Mod: 26,,, | Performed by: RADIOLOGY

## 2022-01-06 PROCEDURE — 77063 BREAST TOMOSYNTHESIS BI: CPT | Mod: TC,PO

## 2022-01-07 ENCOUNTER — OFFICE VISIT (OUTPATIENT)
Dept: DERMATOLOGY | Facility: CLINIC | Age: 67
End: 2022-01-07
Payer: MEDICARE

## 2022-01-07 VITALS — WEIGHT: 165 LBS | BODY MASS INDEX: 24.37 KG/M2

## 2022-01-07 DIAGNOSIS — R20.2 NOTALGIA PARESTHETICA: Primary | ICD-10-CM

## 2022-01-07 DIAGNOSIS — D22.9 NEVUS OF MULTIPLE SITES: ICD-10-CM

## 2022-01-07 DIAGNOSIS — L81.4 LENTIGINES: ICD-10-CM

## 2022-01-07 PROCEDURE — 3008F BODY MASS INDEX DOCD: CPT | Mod: CPTII,S$GLB,, | Performed by: DERMATOLOGY

## 2022-01-07 PROCEDURE — 3288F PR FALLS RISK ASSESSMENT DOCUMENTED: ICD-10-PCS | Mod: CPTII,S$GLB,, | Performed by: DERMATOLOGY

## 2022-01-07 PROCEDURE — 1160F RVW MEDS BY RX/DR IN RCRD: CPT | Mod: CPTII,S$GLB,, | Performed by: DERMATOLOGY

## 2022-01-07 PROCEDURE — 99213 PR OFFICE/OUTPT VISIT, EST, LEVL III, 20-29 MIN: ICD-10-PCS | Mod: S$GLB,,, | Performed by: DERMATOLOGY

## 2022-01-07 PROCEDURE — 99999 PR PBB SHADOW E&M-EST. PATIENT-LVL III: ICD-10-PCS | Mod: PBBFAC,,, | Performed by: DERMATOLOGY

## 2022-01-07 PROCEDURE — 1126F AMNT PAIN NOTED NONE PRSNT: CPT | Mod: CPTII,S$GLB,, | Performed by: DERMATOLOGY

## 2022-01-07 PROCEDURE — 1159F PR MEDICATION LIST DOCUMENTED IN MEDICAL RECORD: ICD-10-PCS | Mod: CPTII,S$GLB,, | Performed by: DERMATOLOGY

## 2022-01-07 PROCEDURE — 1101F PR PT FALLS ASSESS DOC 0-1 FALLS W/OUT INJ PAST YR: ICD-10-PCS | Mod: CPTII,S$GLB,, | Performed by: DERMATOLOGY

## 2022-01-07 PROCEDURE — 1101F PT FALLS ASSESS-DOCD LE1/YR: CPT | Mod: CPTII,S$GLB,, | Performed by: DERMATOLOGY

## 2022-01-07 PROCEDURE — 1159F MED LIST DOCD IN RCRD: CPT | Mod: CPTII,S$GLB,, | Performed by: DERMATOLOGY

## 2022-01-07 PROCEDURE — 1160F PR REVIEW ALL MEDS BY PRESCRIBER/CLIN PHARMACIST DOCUMENTED: ICD-10-PCS | Mod: CPTII,S$GLB,, | Performed by: DERMATOLOGY

## 2022-01-07 PROCEDURE — 99999 PR PBB SHADOW E&M-EST. PATIENT-LVL III: CPT | Mod: PBBFAC,,, | Performed by: DERMATOLOGY

## 2022-01-07 PROCEDURE — 3288F FALL RISK ASSESSMENT DOCD: CPT | Mod: CPTII,S$GLB,, | Performed by: DERMATOLOGY

## 2022-01-07 PROCEDURE — 3008F PR BODY MASS INDEX (BMI) DOCUMENTED: ICD-10-PCS | Mod: CPTII,S$GLB,, | Performed by: DERMATOLOGY

## 2022-01-07 PROCEDURE — 99213 OFFICE O/P EST LOW 20 MIN: CPT | Mod: S$GLB,,, | Performed by: DERMATOLOGY

## 2022-01-07 PROCEDURE — 1126F PR PAIN SEVERITY QUANTIFIED, NO PAIN PRESENT: ICD-10-PCS | Mod: CPTII,S$GLB,, | Performed by: DERMATOLOGY

## 2022-01-07 NOTE — PROGRESS NOTES
Subjective:       Patient ID:  Radha Fraga is a 66 y.o. female who presents for   Chief Complaint   Patient presents with    Rash     Back     See previous note Dr Fam, her notalgia has not improved.  Also would like skin check.       Review of Systems   Constitutional: Negative for fever, chills, weight loss, weight gain, fatigue, night sweats and malaise.   Skin: Positive for itching, rash, daily sunscreen use and activity-related sunscreen use. Negative for wears hat.   Hematologic/Lymphatic: Does not bruise/bleed easily.        Objective:    Physical Exam   Constitutional: She appears well-developed and well-nourished. No distress.   Neurological: She is alert and oriented to person, place, and time. She is not disoriented.   Psychiatric: She has a normal mood and affect.   Skin:   Areas Examined (abnormalities noted in diagram):   Head / Face Inspection Performed  Neck Inspection Performed  Chest / Axilla Inspection Performed  Back Inspection Performed  RUE Inspected  LUE Inspection Performed                   Diagram Legend     Erythematous scaling macule/papule c/w actinic keratosis       Vascular papule c/w angioma      Pigmented verrucoid papule/plaque c/w seborrheic keratosis      Yellow umbilicated papule c/w sebaceous hyperplasia      Irregularly shaped tan macule c/w lentigo     1-2 mm smooth white papules consistent with Milia      Movable subcutaneous cyst with punctum c/w epidermal inclusion cyst      Subcutaneous movable cyst c/w pilar cyst      Firm pink to brown papule c/w dermatofibroma      Pedunculated fleshy papule(s) c/w skin tag(s)      Evenly pigmented macule c/w junctional nevus     Mildly variegated pigmented, slightly irregular-bordered macule c/w mildly atypical nevus      Flesh colored to evenly pigmented papule c/w intradermal nevus       Pink pearly papule/plaque c/w basal cell carcinoma      Erythematous hyperkeratotic cursted plaque c/w SCC      Surgical scar with no sign of  "skin cancer recurrence      Open and closed comedones      Inflammatory papules and pustules      Verrucoid papule consistent consistent with wart     Erythematous eczematous patches and plaques     Dystrophic onycholytic nail with subungual debris c/w onychomycosis     Umbilicated papule    Erythematous-base heme-crusted tan verrucoid plaque consistent with inflamed seborrheic keratosis     Erythematous Silvery Scaling Plaque c/w Psoriasis     See annotation      Assessment / Plan:        Notalgia paresthetica  No hot water  cerave itch relief    Nevus of multiple sites  The "ABCD" rules to observe pigmented lesions were reviewed.  Brochure provided      Lentigines  The "ABCD" rules to observe pigmented lesions were reviewed.  Sunscreen    Jerry Garcia, referred             Follow up in about 1 year (around 1/7/2023).  "

## 2022-01-11 ENCOUNTER — PES CALL (OUTPATIENT)
Dept: ADMINISTRATIVE | Facility: CLINIC | Age: 67
End: 2022-01-11
Payer: MEDICARE

## 2022-01-21 ENCOUNTER — OFFICE VISIT (OUTPATIENT)
Dept: OPHTHALMOLOGY | Facility: CLINIC | Age: 67
End: 2022-01-21
Payer: MEDICARE

## 2022-01-21 DIAGNOSIS — H40.1131 PRIMARY OPEN-ANGLE GLAUCOMA, BILATERAL, MILD STAGE: Primary | ICD-10-CM

## 2022-01-21 PROCEDURE — 1160F RVW MEDS BY RX/DR IN RCRD: CPT | Mod: CPTII,S$GLB,, | Performed by: OPHTHALMOLOGY

## 2022-01-21 PROCEDURE — 1160F PR REVIEW ALL MEDS BY PRESCRIBER/CLIN PHARMACIST DOCUMENTED: ICD-10-PCS | Mod: CPTII,S$GLB,, | Performed by: OPHTHALMOLOGY

## 2022-01-21 PROCEDURE — 1126F PR PAIN SEVERITY QUANTIFIED, NO PAIN PRESENT: ICD-10-PCS | Mod: CPTII,S$GLB,, | Performed by: OPHTHALMOLOGY

## 2022-01-21 PROCEDURE — 3288F PR FALLS RISK ASSESSMENT DOCUMENTED: ICD-10-PCS | Mod: CPTII,S$GLB,, | Performed by: OPHTHALMOLOGY

## 2022-01-21 PROCEDURE — 1159F MED LIST DOCD IN RCRD: CPT | Mod: CPTII,S$GLB,, | Performed by: OPHTHALMOLOGY

## 2022-01-21 PROCEDURE — 1126F AMNT PAIN NOTED NONE PRSNT: CPT | Mod: CPTII,S$GLB,, | Performed by: OPHTHALMOLOGY

## 2022-01-21 PROCEDURE — 65855 ARGON TRABECULOPLASTY - OD - RIGHT EYE: ICD-10-PCS | Mod: RT,S$GLB,, | Performed by: OPHTHALMOLOGY

## 2022-01-21 PROCEDURE — 99499 NO LOS: ICD-10-PCS | Mod: S$GLB,,, | Performed by: OPHTHALMOLOGY

## 2022-01-21 PROCEDURE — 99499 UNLISTED E&M SERVICE: CPT | Mod: S$GLB,,, | Performed by: OPHTHALMOLOGY

## 2022-01-21 PROCEDURE — 1101F PT FALLS ASSESS-DOCD LE1/YR: CPT | Mod: CPTII,S$GLB,, | Performed by: OPHTHALMOLOGY

## 2022-01-21 PROCEDURE — 3288F FALL RISK ASSESSMENT DOCD: CPT | Mod: CPTII,S$GLB,, | Performed by: OPHTHALMOLOGY

## 2022-01-21 PROCEDURE — 99999 PR PBB SHADOW E&M-EST. PATIENT-LVL II: CPT | Mod: PBBFAC,,, | Performed by: OPHTHALMOLOGY

## 2022-01-21 PROCEDURE — 1101F PR PT FALLS ASSESS DOC 0-1 FALLS W/OUT INJ PAST YR: ICD-10-PCS | Mod: CPTII,S$GLB,, | Performed by: OPHTHALMOLOGY

## 2022-01-21 PROCEDURE — 1159F PR MEDICATION LIST DOCUMENTED IN MEDICAL RECORD: ICD-10-PCS | Mod: CPTII,S$GLB,, | Performed by: OPHTHALMOLOGY

## 2022-01-21 PROCEDURE — 65855 TRABECULOPLASTY LASER SURG: CPT | Mod: RT,S$GLB,, | Performed by: OPHTHALMOLOGY

## 2022-01-21 PROCEDURE — 99999 PR PBB SHADOW E&M-EST. PATIENT-LVL II: ICD-10-PCS | Mod: PBBFAC,,, | Performed by: OPHTHALMOLOGY

## 2022-01-21 NOTE — PROGRESS NOTES
HPI     DLS: 1/04/2022    Pt here for SLT OD;  Pt states no eye pain or discomfort. Pt states she took her Diamox pill   this AM as directed. Pt states she is not using any eye drops.              Last edited by Clarita Sancehz on 1/21/2022 10:18 AM. (History)            Assessment /Plan     For exam results, see Encounter Report.    Primary open-angle glaucoma, bilateral, mild stage      SLT OD today  Patient felt faint during procedure, for which a break was given.  The procedure was able to be finished after a pause.      Start ketorolac TID OD x 3 days    F/u next month for SLT OS

## 2022-01-24 NOTE — PROGRESS NOTES
This note was created by combination of typed  and M-Modal dictation.  Transcription errors may be present.  If there are any questions, please contact me.    Assessment and Plan:   Normal physical exam  Tubular adenoma of colon 2013 and 2018  Asymptomatic menopausal state  -return for fasting labs  Check DEXA  Colonoscopy due 2022 unless constipation persists  Needs more physical activity, she contacted [employer] about resources  -     CBC Auto Differential; Future; Expected date: 01/25/2023  -     Comprehensive Metabolic Panel; Future; Expected date: 01/25/2023  -     Lipid Panel; Future; Expected date: 01/25/2023  -     TSH; Future; Expected date: 01/25/2023  -     DXA Bone Density Spine And Hip; Future; Expected date: 01/25/2022    Essential hypertension  -BP good.  Enroll in digital monitoring  -     losartan (COZAAR) 25 MG tablet; Take 1 tablet (25 mg total) by mouth once daily.  Dispense: 90 tablet; Refill: 3  -     CBC Auto Differential; Future; Expected date: 01/25/2023  -     Comprehensive Metabolic Panel; Future; Expected date: 01/25/2023  -     Lipid Panel; Future; Expected date: 01/25/2023  -     Hypertension Digital Medicine ([facility]) Enrollment Order  -     Hypertension Digital Medicine ([facility]): Assign Onboarding Questionnaires    Hypothyroidism due to Hashimoto's thyroiditis  -check TSH on LT.  -     levothyroxine (SYNTHROID) 75 MCG tablet; Take 1 tablet (75 mcg total) by mouth before breakfast.  Dispense: 90 tablet; Refill: 3  -     TSH; Future; Expected date: 01/25/2023    Constipation, unspecified constipation type  -unclear the precipitant  Benign abd exam  Stay hydrated  Try fiber supplement or stool softener  If persists, move up colonoscopy    Fatigue, unspecified type  -nonfocal exam.  Check TSH on LT.  Aside from constipation no other localizing symptoms. Monitor.    Needs flu shot  -     Influenza - Quadrivalent (Adjuvanted)          Medications Discontinued During This  Encounter   Medication Reason    clobetasoL (TEMOVATE) 0.05 % cream     fluocinonide (LIDEX) 0.05 % external solution     acetaZOLAMIDE (DIAMOX) 500 mg CpSR     levothyroxine (SYNTHROID) 75 MCG tablet Reorder    losartan (COZAAR) 25 MG tablet Reorder       meds sent this encounter:  Medications Ordered This Encounter   Medications    levothyroxine (SYNTHROID) 75 MCG tablet     Sig: Take 1 tablet (75 mcg total) by mouth before breakfast.     Dispense:  90 tablet     Refill:  3    losartan (COZAAR) 25 MG tablet     Sig: Take 1 tablet (25 mg total) by mouth once daily.     Dispense:  90 tablet     Refill:  3     .       Follow Up: No follow-ups on file. PEx 1 year with labs.    Subjective:     Chief Complaint   Patient presents with    Constipation     Gassy, abd pain, and bloating. Havent had a bowel movement in about 2 days        HPI  Radha is a 66 y.o. female, last appointment with this clinic was Visit date not found.  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 Patient is postmenopausal.    Last visit with me summer 2020 for physical  Hypertension stable, needs to monitor at home  Hypothyroid on levothyroxine  Migraines stable  Had made significant improvement in diet physical activity and weight loss.    Subsequent diagnosis of COVID infection 2021    Subsequent complaint of palpitations.  Referred to cardiology    2021 saw cards  holter negative  TTE WNL    Notes no BM x several days. Possibly not enough liquid intake.  Adequate fruit and vegetable intake. Sensation of gassiness. Normal urination.   No blood in stool.    Hx of cholelithiasis.    Fatigued. Feels like she can sleep long hours. covid infection 1/2021 a year ago  Other than constipation no localizing symptoms  No fever no chills no HA, no chest pain no cough, no dysuria, no weight changes; no night sweats    Dx of glaucoma  Had a near vasovagal episode with ophthalmology.    Needs more physical activity  Has reached out to [facility] about exercise opportunities  Diet is reasonable.    Patient Care Team:  Chicho Meléndez MD as PCP - General (Internal Medicine)  Edgard Multani II, MD as Consulting Physician (Endocrinology)  Tatianna Latham MD as Consulting Physician (Physical Medicine and Rehabilitation)  Josh Daily MD (Inactive) as Obstetrician (Obstetrics)  Noé Joyce MA as Care Coordinator    Patient Active Problem List    Diagnosis Date Noted    Palpitations 02/03/2021    COVID-19 virus detected 02/03/2021    Chronic midline low back pain without sciatica 08/10/2016     8/10/2016 MRI L spine: 1. Significant degenerative disc disease at L5-S1 resulting in mild bilateral neuroforaminal narrowing.  Otherwise mild multilevel lumbar spondylosis. 2. Cholelithiasis.  MRI of T spine showed: 1. Mild degenerative changes of the thoracic spine. No spinal canal stenosis or neuroforaminal narrowing.  Spinal cord signal and morphology are maintained.2.  Cholelithiasis. 3.  Subcentimeter T2 hyperintense lesion at the liver dome incompletely characterized.      Hypothyroidism due to Hashimoto's thyroiditis 06/16/2015    Migraine syndrome 05/19/2015     Symptoms - tightness in the back of the neck and scotomas - really more opthalmic migraine.  5/18/2015 MRI brain neg  5/18/2015 carotid US negative.      Essential hypertension 03/25/2014     2/10/2021 TTE LV normal size with eccentric hypertrophy and normal systolic function LVEF 60%.  Indeterminate diastolic function.  Normal RV size and systolic function.  No pulmonary hypertension.        Tubular adenoma of  colon 2013 and 2018 03/25/2014 1/11/2013 colonoscopy with sigmoid hyperplastic polyp with suggestion of focal early adenomatous change; melanosis coli.  3/23/2018 colonoscopy rectal sessile tubular adenoma      Alopecia areata 03/25/2014    Asymptomatic cholelithiasis 10/13/2012    Lichen planus 10/13/2012    Scoliosis 10/13/2012       PAST MEDICAL PROBLEMS, PAST SURGICAL HISTORY: please see relevant portions of the electronic medical record    ALLERGIES AND MEDICATIONS: updated and reviewed.  Review of patient's allergies indicates:   Allergen Reactions    Iodinated contrast media        Medication List with Changes/Refills   Current Medications    ACETAZOLAMIDE (DIAMOX) 500 MG CPSR    Take 1 capsule (500 mg total) by mouth once. for 1 dose    CLOBETASOL (TEMOVATE) 0.05 % CREAM    Apply topically 2 (two) times daily.    FLUOCINONIDE (LIDEX) 0.05 % EXTERNAL SOLUTION    APPLY TO AFFECTED AREA TWICE A DAY    LEVOTHYROXINE (SYNTHROID) 75 MCG TABLET    TAKE 1 TABLET BY MOUTH BEFORE BREAKFAST.    LOSARTAN (COZAAR) 25 MG TABLET    TAKE 1 TABLET BY MOUTH EVERY DAY      Review of Systems   Constitutional: Positive for malaise/fatigue. Negative for fever and weight loss.   HENT: Negative for congestion.    Eyes: Negative for blurred vision and pain.   Respiratory: Negative for shortness of breath and wheezing.    Cardiovascular: Negative for chest pain, palpitations and leg swelling.   Gastrointestinal: Positive for constipation. Negative for abdominal pain, blood in stool, diarrhea and heartburn.   Genitourinary: Negative for dysuria, hematuria and urgency.   Musculoskeletal: Negative for joint pain.   Skin: Negative for rash.   Neurological: Negative for tingling, focal weakness, weakness and headaches.   Psychiatric/Behavioral: Negative for depression. The patient is not nervous/anxious.        Objective:   Physical Exam   Vitals:    01/25/22 1346   BP: 132/80   BP Location: Right arm   Patient Position: Sitting  "  BP Method: Medium (Manual)   Pulse: 76   Temp: 98.1 °F (36.7 °C)   TempSrc: Oral   SpO2: 96%   Weight: 78 kg (172 lb)   Height: 5' 9" (1.753 m)    Body mass index is 25.4 kg/m².  Weight: 78 kg (172 lb)   Height: 5' 9" (175.3 cm)     Physical Exam  Constitutional:       Appearance: She is well-developed and well-nourished.   HENT:      Right Ear: Tympanic membrane, ear canal and external ear normal.      Left Ear: Tympanic membrane, ear canal and external ear normal.      Mouth/Throat:      Mouth: Oropharynx is clear and moist.   Eyes:      General: No scleral icterus.     Extraocular Movements: EOM normal.      Pupils: Pupils are equal, round, and reactive to light.   Neck:      Thyroid: No thyromegaly.   Cardiovascular:      Rate and Rhythm: Normal rate and regular rhythm.      Heart sounds: Normal heart sounds. No murmur heard.      Pulmonary:      Effort: Pulmonary effort is normal.      Breath sounds: Normal breath sounds. No wheezing.   Abdominal:      Palpations: Abdomen is soft. There is no hepatomegaly, splenomegaly or mass.      Tenderness: There is no abdominal tenderness.   Musculoskeletal:         General: No deformity or edema. Normal range of motion.      Cervical back: Neck supple.      Right lower leg: No edema.      Left lower leg: No edema.   Lymphadenopathy:      Cervical: No cervical adenopathy.   Skin:     General: Skin is warm and dry.      Findings: No rash.      Comments: On exposed skin   Neurological:      Mental Status: She is alert and oriented to person, place, and time.      Deep Tendon Reflexes: Reflexes are normal and symmetric.   Psychiatric:         Mood and Affect: Mood and affect normal.         Behavior: Behavior normal.         Thought Content: Thought content normal.         Judgment: Judgment normal.           "

## 2022-01-25 ENCOUNTER — OFFICE VISIT (OUTPATIENT)
Dept: FAMILY MEDICINE | Facility: CLINIC | Age: 67
End: 2022-01-25
Payer: MEDICARE

## 2022-01-25 ENCOUNTER — PATIENT MESSAGE (OUTPATIENT)
Dept: ADMINISTRATIVE | Facility: OTHER | Age: 67
End: 2022-01-25
Payer: MEDICARE

## 2022-01-25 VITALS
OXYGEN SATURATION: 96 % | HEART RATE: 76 BPM | BODY MASS INDEX: 25.48 KG/M2 | HEIGHT: 69 IN | SYSTOLIC BLOOD PRESSURE: 132 MMHG | WEIGHT: 172 LBS | DIASTOLIC BLOOD PRESSURE: 80 MMHG | TEMPERATURE: 98 F

## 2022-01-25 DIAGNOSIS — K59.00 CONSTIPATION, UNSPECIFIED CONSTIPATION TYPE: ICD-10-CM

## 2022-01-25 DIAGNOSIS — E03.8 HYPOTHYROIDISM DUE TO HASHIMOTO'S THYROIDITIS: ICD-10-CM

## 2022-01-25 DIAGNOSIS — Z00.00 NORMAL PHYSICAL EXAM: Primary | ICD-10-CM

## 2022-01-25 DIAGNOSIS — R53.83 FATIGUE, UNSPECIFIED TYPE: ICD-10-CM

## 2022-01-25 DIAGNOSIS — Z78.0 ASYMPTOMATIC MENOPAUSAL STATE: ICD-10-CM

## 2022-01-25 DIAGNOSIS — Z23 NEEDS FLU SHOT: ICD-10-CM

## 2022-01-25 DIAGNOSIS — I10 ESSENTIAL HYPERTENSION: ICD-10-CM

## 2022-01-25 DIAGNOSIS — E06.3 HYPOTHYROIDISM DUE TO HASHIMOTO'S THYROIDITIS: ICD-10-CM

## 2022-01-25 DIAGNOSIS — D12.6 TUBULAR ADENOMA OF COLON: ICD-10-CM

## 2022-01-25 PROCEDURE — 3008F PR BODY MASS INDEX (BMI) DOCUMENTED: ICD-10-PCS | Mod: CPTII,S$GLB,, | Performed by: INTERNAL MEDICINE

## 2022-01-25 PROCEDURE — 3288F PR FALLS RISK ASSESSMENT DOCUMENTED: ICD-10-PCS | Mod: CPTII,S$GLB,, | Performed by: INTERNAL MEDICINE

## 2022-01-25 PROCEDURE — 1125F AMNT PAIN NOTED PAIN PRSNT: CPT | Mod: CPTII,S$GLB,, | Performed by: INTERNAL MEDICINE

## 2022-01-25 PROCEDURE — 3079F DIAST BP 80-89 MM HG: CPT | Mod: CPTII,S$GLB,, | Performed by: INTERNAL MEDICINE

## 2022-01-25 PROCEDURE — 4010F ACE/ARB THERAPY RXD/TAKEN: CPT | Mod: CPTII,S$GLB,, | Performed by: INTERNAL MEDICINE

## 2022-01-25 PROCEDURE — 1125F PR PAIN SEVERITY QUANTIFIED, PAIN PRESENT: ICD-10-PCS | Mod: CPTII,S$GLB,, | Performed by: INTERNAL MEDICINE

## 2022-01-25 PROCEDURE — 1101F PR PT FALLS ASSESS DOC 0-1 FALLS W/OUT INJ PAST YR: ICD-10-PCS | Mod: CPTII,S$GLB,, | Performed by: INTERNAL MEDICINE

## 2022-01-25 PROCEDURE — 3075F PR MOST RECENT SYSTOLIC BLOOD PRESS GE 130-139MM HG: ICD-10-PCS | Mod: CPTII,S$GLB,, | Performed by: INTERNAL MEDICINE

## 2022-01-25 PROCEDURE — 3288F FALL RISK ASSESSMENT DOCD: CPT | Mod: CPTII,S$GLB,, | Performed by: INTERNAL MEDICINE

## 2022-01-25 PROCEDURE — 99999 PR PBB SHADOW E&M-EST. PATIENT-LVL IV: ICD-10-PCS | Mod: PBBFAC,,, | Performed by: INTERNAL MEDICINE

## 2022-01-25 PROCEDURE — G0008 ADMIN INFLUENZA VIRUS VAC: HCPCS | Mod: S$GLB,,, | Performed by: INTERNAL MEDICINE

## 2022-01-25 PROCEDURE — 99214 OFFICE O/P EST MOD 30 MIN: CPT | Mod: S$GLB,,, | Performed by: INTERNAL MEDICINE

## 2022-01-25 PROCEDURE — 99214 PR OFFICE/OUTPT VISIT, EST, LEVL IV, 30-39 MIN: ICD-10-PCS | Mod: S$GLB,,, | Performed by: INTERNAL MEDICINE

## 2022-01-25 PROCEDURE — 1159F MED LIST DOCD IN RCRD: CPT | Mod: CPTII,S$GLB,, | Performed by: INTERNAL MEDICINE

## 2022-01-25 PROCEDURE — 3079F PR MOST RECENT DIASTOLIC BLOOD PRESSURE 80-89 MM HG: ICD-10-PCS | Mod: CPTII,S$GLB,, | Performed by: INTERNAL MEDICINE

## 2022-01-25 PROCEDURE — 90694 FLU VACCINE - QUADRIVALENT - ADJUVANTED: ICD-10-PCS | Mod: S$GLB,,, | Performed by: INTERNAL MEDICINE

## 2022-01-25 PROCEDURE — 1101F PT FALLS ASSESS-DOCD LE1/YR: CPT | Mod: CPTII,S$GLB,, | Performed by: INTERNAL MEDICINE

## 2022-01-25 PROCEDURE — 1159F PR MEDICATION LIST DOCUMENTED IN MEDICAL RECORD: ICD-10-PCS | Mod: CPTII,S$GLB,, | Performed by: INTERNAL MEDICINE

## 2022-01-25 PROCEDURE — 3075F SYST BP GE 130 - 139MM HG: CPT | Mod: CPTII,S$GLB,, | Performed by: INTERNAL MEDICINE

## 2022-01-25 PROCEDURE — 1160F RVW MEDS BY RX/DR IN RCRD: CPT | Mod: CPTII,S$GLB,, | Performed by: INTERNAL MEDICINE

## 2022-01-25 PROCEDURE — G0008 FLU VACCINE - QUADRIVALENT - ADJUVANTED: ICD-10-PCS | Mod: S$GLB,,, | Performed by: INTERNAL MEDICINE

## 2022-01-25 PROCEDURE — 3008F BODY MASS INDEX DOCD: CPT | Mod: CPTII,S$GLB,, | Performed by: INTERNAL MEDICINE

## 2022-01-25 PROCEDURE — 1160F PR REVIEW ALL MEDS BY PRESCRIBER/CLIN PHARMACIST DOCUMENTED: ICD-10-PCS | Mod: CPTII,S$GLB,, | Performed by: INTERNAL MEDICINE

## 2022-01-25 PROCEDURE — 4010F PR ACE/ARB THEARPY RXD/TAKEN: ICD-10-PCS | Mod: CPTII,S$GLB,, | Performed by: INTERNAL MEDICINE

## 2022-01-25 PROCEDURE — 99999 PR PBB SHADOW E&M-EST. PATIENT-LVL IV: CPT | Mod: PBBFAC,,, | Performed by: INTERNAL MEDICINE

## 2022-01-25 PROCEDURE — 90694 VACC AIIV4 NO PRSRV 0.5ML IM: CPT | Mod: S$GLB,,, | Performed by: INTERNAL MEDICINE

## 2022-01-25 RX ORDER — LOSARTAN POTASSIUM 25 MG/1
25 TABLET ORAL DAILY
Qty: 90 TABLET | Refills: 3 | Status: SHIPPED | OUTPATIENT
Start: 2022-01-25 | End: 2023-01-26 | Stop reason: SDUPTHER

## 2022-01-25 RX ORDER — LEVOTHYROXINE SODIUM 75 UG/1
75 TABLET ORAL
Qty: 90 TABLET | Refills: 3 | Status: SHIPPED | OUTPATIENT
Start: 2022-01-25 | End: 2023-01-26 | Stop reason: SDUPTHER

## 2022-01-25 NOTE — PATIENT INSTRUCTIONS
For constipation - try Citrucel  Mag citrate or Milk of magnesium also can try  miralax would be ok    Whatever you choose, be sure to drink plenty of water    Be sure to take calcium 1200 mg daily  And vitamin D 800 IU daily

## 2022-01-26 ENCOUNTER — HOSPITAL ENCOUNTER (OUTPATIENT)
Dept: RADIOLOGY | Facility: CLINIC | Age: 67
Discharge: HOME | End: 2022-01-26
Attending: INTERNAL MEDICINE
Payer: MEDICARE

## 2022-01-26 DIAGNOSIS — Z78.0 ASYMPTOMATIC MENOPAUSAL STATE: ICD-10-CM

## 2022-01-26 PROCEDURE — 77080 DXA BONE DENSITY AXIAL: CPT | Mod: TC,PO

## 2022-01-26 PROCEDURE — 77080 DEXA BONE DENSITY SPINE HIP: ICD-10-PCS | Mod: 26,,, | Performed by: INTERNAL MEDICINE

## 2022-01-26 PROCEDURE — 77080 DXA BONE DENSITY AXIAL: CPT | Mod: 26,,, | Performed by: INTERNAL MEDICINE

## 2022-01-27 ENCOUNTER — IMMUNIZATION (OUTPATIENT)
Dept: OBSTETRICS AND GYNECOLOGY | Facility: CLINIC | Age: 67
End: 2022-01-27
Payer: MEDICARE

## 2022-01-27 DIAGNOSIS — Z23 NEED FOR VACCINATION: Primary | ICD-10-CM

## 2022-01-27 PROCEDURE — 0004A COVID-19, MRNA, LNP-S, PF, 30 MCG/0.3 ML DOSE VACCINE: CPT | Mod: PBBFAC | Performed by: FAMILY MEDICINE

## 2022-02-10 PROBLEM — M85.89 OSTEOPENIA OF MULTIPLE SITES: Status: ACTIVE | Noted: 2022-02-10

## 2022-02-10 NOTE — PROGRESS NOTES
02/08/2022 DEXA scan L-spine-1.4, total hip-0.2, femoral neck 0.1.  FRAX score 0.5/6.9%.  Osteopenia.  Compared to previous, 5% decline total hip.    Results to patient via Dexmo.  Work on weight-bearing exercise, calcium supplementation.  Repeat 3-4 years.

## 2022-02-15 ENCOUNTER — PATIENT MESSAGE (OUTPATIENT)
Dept: OPHTHALMOLOGY | Facility: CLINIC | Age: 67
End: 2022-02-15
Payer: MEDICARE

## 2022-02-16 RX ORDER — DIAZEPAM 2 MG/1
2 TABLET ORAL ONCE
Qty: 1 TABLET | Refills: 0 | Status: SHIPPED | OUTPATIENT
Start: 2022-02-16 | End: 2022-02-18 | Stop reason: ALTCHOICE

## 2022-02-18 ENCOUNTER — OFFICE VISIT (OUTPATIENT)
Dept: OPHTHALMOLOGY | Facility: CLINIC | Age: 67
End: 2022-02-18
Payer: MEDICARE

## 2022-02-18 DIAGNOSIS — H40.1131 PRIMARY OPEN-ANGLE GLAUCOMA, BILATERAL, MILD STAGE: Primary | ICD-10-CM

## 2022-02-18 PROCEDURE — 1159F MED LIST DOCD IN RCRD: CPT | Mod: CPTII,S$GLB,, | Performed by: OPHTHALMOLOGY

## 2022-02-18 PROCEDURE — 65855 TRABECULOPLASTY LASER SURG: CPT | Mod: LT,S$GLB,, | Performed by: OPHTHALMOLOGY

## 2022-02-18 PROCEDURE — 4010F ACE/ARB THERAPY RXD/TAKEN: CPT | Mod: CPTII,S$GLB,, | Performed by: OPHTHALMOLOGY

## 2022-02-18 PROCEDURE — 65855 ARGON TRABECULOPLASTY - OS - LEFT EYE: ICD-10-PCS | Mod: LT,S$GLB,, | Performed by: OPHTHALMOLOGY

## 2022-02-18 PROCEDURE — 4010F PR ACE/ARB THEARPY RXD/TAKEN: ICD-10-PCS | Mod: CPTII,S$GLB,, | Performed by: OPHTHALMOLOGY

## 2022-02-18 PROCEDURE — 1101F PT FALLS ASSESS-DOCD LE1/YR: CPT | Mod: CPTII,S$GLB,, | Performed by: OPHTHALMOLOGY

## 2022-02-18 PROCEDURE — 1101F PR PT FALLS ASSESS DOC 0-1 FALLS W/OUT INJ PAST YR: ICD-10-PCS | Mod: CPTII,S$GLB,, | Performed by: OPHTHALMOLOGY

## 2022-02-18 PROCEDURE — 99999 PR PBB SHADOW E&M-EST. PATIENT-LVL II: ICD-10-PCS | Mod: PBBFAC,,, | Performed by: OPHTHALMOLOGY

## 2022-02-18 PROCEDURE — 1160F PR REVIEW ALL MEDS BY PRESCRIBER/CLIN PHARMACIST DOCUMENTED: ICD-10-PCS | Mod: CPTII,S$GLB,, | Performed by: OPHTHALMOLOGY

## 2022-02-18 PROCEDURE — 1159F PR MEDICATION LIST DOCUMENTED IN MEDICAL RECORD: ICD-10-PCS | Mod: CPTII,S$GLB,, | Performed by: OPHTHALMOLOGY

## 2022-02-18 PROCEDURE — 1126F PR PAIN SEVERITY QUANTIFIED, NO PAIN PRESENT: ICD-10-PCS | Mod: CPTII,S$GLB,, | Performed by: OPHTHALMOLOGY

## 2022-02-18 PROCEDURE — 99499 UNLISTED E&M SERVICE: CPT | Mod: S$GLB,,, | Performed by: OPHTHALMOLOGY

## 2022-02-18 PROCEDURE — 3288F FALL RISK ASSESSMENT DOCD: CPT | Mod: CPTII,S$GLB,, | Performed by: OPHTHALMOLOGY

## 2022-02-18 PROCEDURE — 3288F PR FALLS RISK ASSESSMENT DOCUMENTED: ICD-10-PCS | Mod: CPTII,S$GLB,, | Performed by: OPHTHALMOLOGY

## 2022-02-18 PROCEDURE — 1160F RVW MEDS BY RX/DR IN RCRD: CPT | Mod: CPTII,S$GLB,, | Performed by: OPHTHALMOLOGY

## 2022-02-18 PROCEDURE — 1126F AMNT PAIN NOTED NONE PRSNT: CPT | Mod: CPTII,S$GLB,, | Performed by: OPHTHALMOLOGY

## 2022-02-18 PROCEDURE — 99499 NO LOS: ICD-10-PCS | Mod: S$GLB,,, | Performed by: OPHTHALMOLOGY

## 2022-02-18 PROCEDURE — 99999 PR PBB SHADOW E&M-EST. PATIENT-LVL II: CPT | Mod: PBBFAC,,, | Performed by: OPHTHALMOLOGY

## 2022-02-18 NOTE — PROGRESS NOTES
HPI     DLS: 1/21/2022    Pt here for SLT OS;  S/P SLT OD- 1/21/2022  Pt states no eye pain or discomfort after the laser in her OD.    Meds;  No GTTS    Last edited by Clarita Sanchez on 2/18/2022 10:30 AM. (History)            Assessment /Plan     For exam results, see Encounter Report.    Primary open-angle glaucoma, bilateral, mild stage      SLT OS today, no complications  Ketorolac TID OS x 3 days    F/u as scheduled next month for IOP check

## 2022-02-23 DIAGNOSIS — D84.9 IMMUNOSUPPRESSED STATUS: ICD-10-CM

## 2022-03-21 ENCOUNTER — CLINICAL SUPPORT (OUTPATIENT)
Dept: OPHTHALMOLOGY | Facility: CLINIC | Age: 67
End: 2022-03-21
Payer: MEDICARE

## 2022-03-21 ENCOUNTER — OFFICE VISIT (OUTPATIENT)
Dept: OPHTHALMOLOGY | Facility: CLINIC | Age: 67
End: 2022-03-21
Payer: MEDICARE

## 2022-03-21 DIAGNOSIS — H40.1131 PRIMARY OPEN-ANGLE GLAUCOMA, BILATERAL, MILD STAGE: Primary | ICD-10-CM

## 2022-03-21 PROCEDURE — 2023F PR DILATED RETINAL EXAM W/O EVID OF RETINOPATHY: ICD-10-PCS | Mod: CPTII,S$GLB,, | Performed by: OPHTHALMOLOGY

## 2022-03-21 PROCEDURE — 4010F ACE/ARB THERAPY RXD/TAKEN: CPT | Mod: CPTII,S$GLB,, | Performed by: OPHTHALMOLOGY

## 2022-03-21 PROCEDURE — 92083 EXTENDED VISUAL FIELD XM: CPT | Mod: S$GLB,,, | Performed by: OPHTHALMOLOGY

## 2022-03-21 PROCEDURE — 1126F AMNT PAIN NOTED NONE PRSNT: CPT | Mod: CPTII,S$GLB,, | Performed by: OPHTHALMOLOGY

## 2022-03-21 PROCEDURE — 2023F DILAT RTA XM W/O RTNOPTHY: CPT | Mod: CPTII,S$GLB,, | Performed by: OPHTHALMOLOGY

## 2022-03-21 PROCEDURE — 1159F MED LIST DOCD IN RCRD: CPT | Mod: CPTII,S$GLB,, | Performed by: OPHTHALMOLOGY

## 2022-03-21 PROCEDURE — 99213 PR OFFICE/OUTPT VISIT, EST, LEVL III, 20-29 MIN: ICD-10-PCS | Mod: S$GLB,,, | Performed by: OPHTHALMOLOGY

## 2022-03-21 PROCEDURE — 1160F PR REVIEW ALL MEDS BY PRESCRIBER/CLIN PHARMACIST DOCUMENTED: ICD-10-PCS | Mod: CPTII,S$GLB,, | Performed by: OPHTHALMOLOGY

## 2022-03-21 PROCEDURE — 1159F PR MEDICATION LIST DOCUMENTED IN MEDICAL RECORD: ICD-10-PCS | Mod: CPTII,S$GLB,, | Performed by: OPHTHALMOLOGY

## 2022-03-21 PROCEDURE — 99213 OFFICE O/P EST LOW 20 MIN: CPT | Mod: S$GLB,,, | Performed by: OPHTHALMOLOGY

## 2022-03-21 PROCEDURE — 1101F PR PT FALLS ASSESS DOC 0-1 FALLS W/OUT INJ PAST YR: ICD-10-PCS | Mod: CPTII,S$GLB,, | Performed by: OPHTHALMOLOGY

## 2022-03-21 PROCEDURE — 92083 HUMPHREY VISUAL FIELD - OU - BOTH EYES: ICD-10-PCS | Mod: S$GLB,,, | Performed by: OPHTHALMOLOGY

## 2022-03-21 PROCEDURE — 4010F PR ACE/ARB THEARPY RXD/TAKEN: ICD-10-PCS | Mod: CPTII,S$GLB,, | Performed by: OPHTHALMOLOGY

## 2022-03-21 PROCEDURE — 1160F RVW MEDS BY RX/DR IN RCRD: CPT | Mod: CPTII,S$GLB,, | Performed by: OPHTHALMOLOGY

## 2022-03-21 PROCEDURE — 3288F PR FALLS RISK ASSESSMENT DOCUMENTED: ICD-10-PCS | Mod: CPTII,S$GLB,, | Performed by: OPHTHALMOLOGY

## 2022-03-21 PROCEDURE — 1101F PT FALLS ASSESS-DOCD LE1/YR: CPT | Mod: CPTII,S$GLB,, | Performed by: OPHTHALMOLOGY

## 2022-03-21 PROCEDURE — 99999 PR PBB SHADOW E&M-EST. PATIENT-LVL II: CPT | Mod: PBBFAC,,, | Performed by: OPHTHALMOLOGY

## 2022-03-21 PROCEDURE — 1126F PR PAIN SEVERITY QUANTIFIED, NO PAIN PRESENT: ICD-10-PCS | Mod: CPTII,S$GLB,, | Performed by: OPHTHALMOLOGY

## 2022-03-21 PROCEDURE — 3288F FALL RISK ASSESSMENT DOCD: CPT | Mod: CPTII,S$GLB,, | Performed by: OPHTHALMOLOGY

## 2022-03-21 PROCEDURE — 99999 PR PBB SHADOW E&M-EST. PATIENT-LVL II: ICD-10-PCS | Mod: PBBFAC,,, | Performed by: OPHTHALMOLOGY

## 2022-03-21 NOTE — PROGRESS NOTES
HPI     65 YO female presents today for an IOP check/HVF. Patient states that she   has occasional pain in the back of OD, kind of like the same feeling that   she had when the laser was being done. Patient states that she also feels   distance/near vision OU is not as good as it used to be.     S/P SLT OD- 1/21/2022    Last edited by Elena Bose, PCT on 3/21/2022 10:07 AM. (History)            Assessment /Plan     For exam results, see Encounter Report.    Primary open-angle glaucoma, bilateral, mild stage        Unknown famhx  Thin pachy    Very high IOP OD>OS tmax 38/28  Optic nerves look healthy yet asymmetry OD>OS  Substantial OCT progression OD>OS  HVF essentially normal  S/p SLT 2022 OU, excellent response  Target IOP low 20s    IOP at target off meds post SLT    F/u 6 months, DFE, OCT NFL

## 2022-06-16 ENCOUNTER — PES CALL (OUTPATIENT)
Dept: ADMINISTRATIVE | Facility: CLINIC | Age: 67
End: 2022-06-16
Payer: MEDICARE

## 2022-06-17 ENCOUNTER — TELEPHONE (OUTPATIENT)
Dept: ADMINISTRATIVE | Facility: CLINIC | Age: 67
End: 2022-06-17
Payer: MEDICARE

## 2022-06-21 ENCOUNTER — PES CALL (OUTPATIENT)
Dept: ADMINISTRATIVE | Facility: CLINIC | Age: 67
End: 2022-06-21
Payer: MEDICARE

## 2022-06-27 NOTE — PROGRESS NOTES
Patient tolerated vaccination well. Advised patient to wait 15 mins after shot. Verbalized understanding.   [de-identified] : This is a 45 year  old patient who was referred by Dr. Eric Crawford with the chief complaint of having stomach mass.  she states that she saw GI specialist die to the abdominal pain and history of diverticulitis. She had an EGD on  05/31/2022  that showed 2.5 cm sessile polyp. Biopsy of the polyp was negative for dysplasia     She denies any fever or  night sweats. Appetite is good and weight is stable.   . She wants to know if the polyp could  be surgically  removed. \par Ms. JANEL ONEIL is s/p laparoscopic cholecystectomy on 06/22/2021

## 2022-07-18 ENCOUNTER — TELEPHONE (OUTPATIENT)
Dept: ADMINISTRATIVE | Facility: CLINIC | Age: 67
End: 2022-07-18
Payer: MEDICARE

## 2022-07-18 NOTE — TELEPHONE ENCOUNTER
Called pt, no answer; left message informing pt I was calling to remind pt of her in office EAWV on 7/19/22 and to return my call if she had any questions; left my name and number

## 2022-07-19 ENCOUNTER — TELEPHONE (OUTPATIENT)
Dept: ADMINISTRATIVE | Facility: CLINIC | Age: 67
End: 2022-07-19
Payer: MEDICARE

## 2022-07-19 ENCOUNTER — PES CALL (OUTPATIENT)
Dept: ADMINISTRATIVE | Facility: CLINIC | Age: 67
End: 2022-07-19
Payer: MEDICARE

## 2022-07-19 NOTE — TELEPHONE ENCOUNTER
Called pt, informed pt I was calling to remind pt of her in office EAWV on 7/20/22; pt stated she needed to reschedule due to having something come up; appt rescheduled to 7/21; confirmed appt and location with pt

## 2022-07-21 ENCOUNTER — PATIENT MESSAGE (OUTPATIENT)
Dept: FAMILY MEDICINE | Facility: CLINIC | Age: 67
End: 2022-07-21

## 2022-07-21 ENCOUNTER — OFFICE VISIT (OUTPATIENT)
Dept: FAMILY MEDICINE | Facility: CLINIC | Age: 67
End: 2022-07-21
Payer: MEDICARE

## 2022-07-21 VITALS
WEIGHT: 173.81 LBS | DIASTOLIC BLOOD PRESSURE: 88 MMHG | SYSTOLIC BLOOD PRESSURE: 138 MMHG | HEART RATE: 67 BPM | HEIGHT: 69 IN | BODY MASS INDEX: 25.74 KG/M2 | OXYGEN SATURATION: 98 %

## 2022-07-21 DIAGNOSIS — E06.3 HYPOTHYROIDISM DUE TO HASHIMOTO'S THYROIDITIS: Chronic | ICD-10-CM

## 2022-07-21 DIAGNOSIS — D12.6 TUBULAR ADENOMA OF COLON: ICD-10-CM

## 2022-07-21 DIAGNOSIS — Z00.00 ENCOUNTER FOR PREVENTIVE HEALTH EXAMINATION: Primary | ICD-10-CM

## 2022-07-21 DIAGNOSIS — G89.29 CHRONIC MIDLINE LOW BACK PAIN WITHOUT SCIATICA: Primary | ICD-10-CM

## 2022-07-21 DIAGNOSIS — I10 ESSENTIAL HYPERTENSION: ICD-10-CM

## 2022-07-21 DIAGNOSIS — M85.89 OSTEOPENIA OF MULTIPLE SITES: ICD-10-CM

## 2022-07-21 DIAGNOSIS — E03.8 HYPOTHYROIDISM DUE TO HASHIMOTO'S THYROIDITIS: Chronic | ICD-10-CM

## 2022-07-21 DIAGNOSIS — M54.50 CHRONIC MIDLINE LOW BACK PAIN WITHOUT SCIATICA: Primary | ICD-10-CM

## 2022-07-21 PROCEDURE — 3008F PR BODY MASS INDEX (BMI) DOCUMENTED: ICD-10-PCS | Mod: CPTII,S$GLB,, | Performed by: NURSE PRACTITIONER

## 2022-07-21 PROCEDURE — G0439 PR MEDICARE ANNUAL WELLNESS SUBSEQUENT VISIT: ICD-10-PCS | Mod: S$GLB,,, | Performed by: NURSE PRACTITIONER

## 2022-07-21 PROCEDURE — 1126F AMNT PAIN NOTED NONE PRSNT: CPT | Mod: CPTII,S$GLB,, | Performed by: NURSE PRACTITIONER

## 2022-07-21 PROCEDURE — 1101F PR PT FALLS ASSESS DOC 0-1 FALLS W/OUT INJ PAST YR: ICD-10-PCS | Mod: CPTII,S$GLB,, | Performed by: NURSE PRACTITIONER

## 2022-07-21 PROCEDURE — 1170F PR FUNCTIONAL STATUS ASSESSED: ICD-10-PCS | Mod: CPTII,S$GLB,, | Performed by: NURSE PRACTITIONER

## 2022-07-21 PROCEDURE — 3079F PR MOST RECENT DIASTOLIC BLOOD PRESSURE 80-89 MM HG: ICD-10-PCS | Mod: CPTII,S$GLB,, | Performed by: NURSE PRACTITIONER

## 2022-07-21 PROCEDURE — 4010F PR ACE/ARB THEARPY RXD/TAKEN: ICD-10-PCS | Mod: CPTII,S$GLB,, | Performed by: NURSE PRACTITIONER

## 2022-07-21 PROCEDURE — 1170F FXNL STATUS ASSESSED: CPT | Mod: CPTII,S$GLB,, | Performed by: NURSE PRACTITIONER

## 2022-07-21 PROCEDURE — 1160F PR REVIEW ALL MEDS BY PRESCRIBER/CLIN PHARMACIST DOCUMENTED: ICD-10-PCS | Mod: CPTII,S$GLB,, | Performed by: NURSE PRACTITIONER

## 2022-07-21 PROCEDURE — 1126F PR PAIN SEVERITY QUANTIFIED, NO PAIN PRESENT: ICD-10-PCS | Mod: CPTII,S$GLB,, | Performed by: NURSE PRACTITIONER

## 2022-07-21 PROCEDURE — 3288F FALL RISK ASSESSMENT DOCD: CPT | Mod: CPTII,S$GLB,, | Performed by: NURSE PRACTITIONER

## 2022-07-21 PROCEDURE — 3079F DIAST BP 80-89 MM HG: CPT | Mod: CPTII,S$GLB,, | Performed by: NURSE PRACTITIONER

## 2022-07-21 PROCEDURE — 4010F ACE/ARB THERAPY RXD/TAKEN: CPT | Mod: CPTII,S$GLB,, | Performed by: NURSE PRACTITIONER

## 2022-07-21 PROCEDURE — 99999 PR PBB SHADOW E&M-EST. PATIENT-LVL IV: ICD-10-PCS | Mod: PBBFAC,,, | Performed by: NURSE PRACTITIONER

## 2022-07-21 PROCEDURE — 3008F BODY MASS INDEX DOCD: CPT | Mod: CPTII,S$GLB,, | Performed by: NURSE PRACTITIONER

## 2022-07-21 PROCEDURE — 3075F PR MOST RECENT SYSTOLIC BLOOD PRESS GE 130-139MM HG: ICD-10-PCS | Mod: CPTII,S$GLB,, | Performed by: NURSE PRACTITIONER

## 2022-07-21 PROCEDURE — 1159F MED LIST DOCD IN RCRD: CPT | Mod: CPTII,S$GLB,, | Performed by: NURSE PRACTITIONER

## 2022-07-21 PROCEDURE — 3288F PR FALLS RISK ASSESSMENT DOCUMENTED: ICD-10-PCS | Mod: CPTII,S$GLB,, | Performed by: NURSE PRACTITIONER

## 2022-07-21 PROCEDURE — 1101F PT FALLS ASSESS-DOCD LE1/YR: CPT | Mod: CPTII,S$GLB,, | Performed by: NURSE PRACTITIONER

## 2022-07-21 PROCEDURE — 1160F RVW MEDS BY RX/DR IN RCRD: CPT | Mod: CPTII,S$GLB,, | Performed by: NURSE PRACTITIONER

## 2022-07-21 PROCEDURE — 1159F PR MEDICATION LIST DOCUMENTED IN MEDICAL RECORD: ICD-10-PCS | Mod: CPTII,S$GLB,, | Performed by: NURSE PRACTITIONER

## 2022-07-21 PROCEDURE — 3075F SYST BP GE 130 - 139MM HG: CPT | Mod: CPTII,S$GLB,, | Performed by: NURSE PRACTITIONER

## 2022-07-21 PROCEDURE — G0439 PPPS, SUBSEQ VISIT: HCPCS | Mod: S$GLB,,, | Performed by: NURSE PRACTITIONER

## 2022-07-21 PROCEDURE — 99999 PR PBB SHADOW E&M-EST. PATIENT-LVL IV: CPT | Mod: PBBFAC,,, | Performed by: NURSE PRACTITIONER

## 2022-07-21 NOTE — PROGRESS NOTES
"  Radha Fraga presented for a  Medicare AWV and comprehensive Health Risk Assessment today. The following components were reviewed and updated:    · Medical history  · Family History  · Social history  · Allergies and Current Medications  · Health Risk Assessment  · Health Maintenance  · Care Team       ** See Completed Assessments for Annual Wellness Visit within the encounter summary.**       The following assessments were completed:  · Living Situation  · CAGE  · Depression Screening  · Timed Get Up and Go  · Whisper Test  · Cognitive Function Screening  · Nutrition Screening  · ADL Screening  · PAQ Screening          Vitals:    07/21/22 1011   BP: 138/88   BP Location: Left arm   Patient Position: Sitting   BP Method: Medium (Manual)   Pulse: 67   SpO2: 98%   Weight: 78.8 kg (173 lb 13.3 oz)   Height: 5' 9" (1.753 m)     Body mass index is 25.67 kg/m².  Physical Exam  Vitals and nursing note reviewed.   Constitutional:       Appearance: Normal appearance.   Cardiovascular:      Rate and Rhythm: Normal rate.      Pulses: Normal pulses.      Heart sounds: Normal heart sounds.   Pulmonary:      Effort: Pulmonary effort is normal.      Breath sounds: Normal breath sounds.   Abdominal:      General: Bowel sounds are normal.      Palpations: Abdomen is soft.   Musculoskeletal:         General: Normal range of motion.   Neurological:      Mental Status: She is alert and oriented to person, place, and time.   Psychiatric:         Mood and Affect: Mood normal.         Behavior: Behavior normal.             Diagnoses and health risks identified today and associated recommendations/orders:    1. Encounter for preventive health examination  Pt was seen today for an Annual Wellness visit. Healthcare maintenance and screening recommendations were discussed and updated as indicated. Return in one year for AWV.    Review current opioid prescriptions: n/a  Screen for potential Substance Use Disorders: n/a    2. Essential " hypertension  The current medical regimen is effective;  continue present plan and medications.    3. Hypothyroidism due to Hashimoto's thyroiditis  The current medical regimen is effective;  continue present plan and medications.    4. Osteopenia of multiple sites  The current medical regimen is effective;  continue present plan and medications.    5. Tubular adenoma of colon 2013 and 2018  The current medical regimen is effective;  continue present plan and medications.        Provided Radha with a 5-10 year written screening schedule and personal prevention plan. Recommendations were developed using the USPSTF age appropriate recommendations. Education, counseling, and referrals were provided as needed. After Visit Summary printed and given to patient which includes a list of additional screenings\tests needed.    Follow up in about 27 weeks (around 1/26/2023) with provider.    SERGEY Long  I offered to discuss advanced care planning, including how to pick a person who would make decisions for you if you were unable to make them for yourself, called a health care power of , and what kind of decisions you might make such as use of life sustaining treatments such as ventilators and tube feeding when faced with a life limiting illness recorded on a living will that they will need to know. (How you want to be cared for as you near the end of your natural life)     X Patient is interested in learning more about how to make advanced directives.  I provided them paperwork and offered to discuss this with them.

## 2022-07-21 NOTE — PATIENT INSTRUCTIONS
Counseling and Referral of Other Preventative  (Italic type indicates deductible and co-insurance are waived)    Patient Name: Radha Fraga  Today's Date: 7/21/2022    Health Maintenance       Date Due Completion Date    COVID-19 Vaccine (4 - Booster for Pfizer series) 05/27/2022 1/27/2022    Influenza Vaccine (1) 09/01/2022 1/25/2022    Mammogram 01/06/2023 1/6/2022    Colorectal Cancer Screening 03/23/2023 3/23/2018    DEXA Scan 01/26/2025 1/26/2022    Lipid Panel 01/26/2027 1/26/2022    TETANUS VACCINE 03/15/2027 3/15/2017        No orders of the defined types were placed in this encounter.    The following information is provided to all patients.  This information is to help you find resources for any of the problems found today that may be affecting your health:                Living healthy guide: www.[facility].[place]      Understanding Diabetes: www.diabetes.org      Eating healthy: www.cdc.gov/healthyweight      CDC home safety checklist: www.cdc.gov/steadi/patient.html      Agency on Aging: www.goea.[place]      Alcoholics anonymous (AA): www.aa.org      Physical Activity: www.delta.nih.gov/ij4owlk      Tobacco use: www.quitwithusla.org

## 2022-09-01 ENCOUNTER — CLINICAL SUPPORT (OUTPATIENT)
Dept: REHABILITATION | Facility: HOSPITAL | Age: 67
End: 2022-09-01
Attending: FAMILY MEDICINE
Payer: MEDICARE

## 2022-09-01 DIAGNOSIS — M25.69 DECREASED ROM OF TRUNK AND BACK: ICD-10-CM

## 2022-09-01 DIAGNOSIS — R29.898 DECREASED STRENGTH OF TRUNK AND BACK: ICD-10-CM

## 2022-09-01 DIAGNOSIS — M54.50 CHRONIC MIDLINE LOW BACK PAIN WITHOUT SCIATICA: ICD-10-CM

## 2022-09-01 DIAGNOSIS — G89.29 CHRONIC MIDLINE LOW BACK PAIN WITHOUT SCIATICA: ICD-10-CM

## 2022-09-01 PROCEDURE — 97110 THERAPEUTIC EXERCISES: CPT | Mod: PN | Performed by: PHYSICAL MEDICINE & REHABILITATION

## 2022-09-01 PROCEDURE — 97162 PT EVAL MOD COMPLEX 30 MIN: CPT | Mod: PN | Performed by: PHYSICAL MEDICINE & REHABILITATION

## 2022-09-01 NOTE — PLAN OF CARE
[facility] - PHYSICAL THERAPY LUMBAR EVALUATION     Name: Radha Fraga  Clinic Number: [id number]    Therapy Diagnosis:   Encounter Diagnoses   Name Primary?    Chronic midline low back pain without sciatica     Decreased ROM of trunk and back     Decreased strength of trunk and back      Physician: Wilber Eduardo MD    Physician Orders: PT Eval and Treat    Medical Diagnosis from Referral: M54.50,G89.29 (ICD-10-CM) - Chronic midline low back pain without sciatica  Evaluation Date: 9/1/2022  Authorization Period Expiration: 7/21/24  Plan of Care Expiration: 12/1/22  Reassessment Due: 10/1/22  Visit # / Visits authorized: 1/ 20    Time In: 11:00 am  Time Out: 12:30 pm  Total Billable Time: 90 minutes  Insurance:Fee for service Insurance Patient        Precautions:  standard    Pattern of pain determined: 1 PEP      Subjective   Date of onset: long standing pain  History of current condition - Radha reports: she notices an aching back in the morning.  This has been going on for years but she hasn't sought treatment for it.  She has not had care for it.   Pain is low back, no leg symptoms at all.  Intermittent.    Worse in the morning no matter what bed she sleeps in.  Lasts awhile, hard to say how long.  Worse with being still, sitting too long, maybe with housework  Better with standing, walking, stretching       Prior Therapy: nil  Prior Treatment: nil  Social History: lives with   Occupation: retired  Leisure: read      Prior Level of Function: full  Current Level of Function: hasn't gotten to gym, afraid to hurt herself  DME owned/used:  [employer]      Pain:  Current 0/10, worst 5/10, best 0/10   Location: bilateral back   Description: Burning and Throbbing  Aggravating Factors: Sitting, Morning, Flexing, and Getting out of bed/chair  Easing Factors: exercise  Disturbed Sleep: sometimes wakes her and she changes position        Pattern of pain questions:  1.  Where is your pain  the worst? back  2.  Is your pain constant or intermittent? intermittent  3.  Does bending forward make your typical pain worse? yes  4.  Since the start of your back pain, has there been a change in your bowel or bladder? no  5.  What can't you do now that you use to be able to do?  Getting on and off floor, more weakness than pain      Pts goals: get stronger and stop aching  Medical History:   Past Medical History:   Diagnosis Date    Asymptomatic cholelithiasis     Hypothyroidism     Lichen planus     Scoliosis     Unspecified essential hypertension 3/25/2014       Surgical History:   Radha Fraga  has a past surgical history that includes Tubal ligation; Tonsillectomy; Colonoscopy (2006); Nose surgery; and Colonoscopy (N/A, 3/23/2018).    Medications:   Radha has a current medication list which includes the following prescription(s): levothyroxine and losartan, and the following Facility-Administered Medications: acetaminophen.    Allergies:   Review of patient's allergies indicates:   Allergen Reactions    Iodinated contrast media         Imaging,     8/10/42R7-O6: Mild diffuse posterior disc bulge contributing to mild bilateral neuroforaminal narrowing.  No significant canal stenosis.  Anterior annular fissure and protrusion of disc material anteriorly.  IMPRESSION:         Significant degenerative disc disease at L5-S1 resulting in mild bilateral neuroforaminal narrowing.  Otherwise mild multilevel lumbar spondylosis.            Red Flag Screening:   Cough  Sneeze  Strain: (--)  Bladder/ bowel: (--)  Falls: (--)  Night pain: (--)  Unexplained weight loss: (--)  General health: fair    OBJECTIVE     Postural examination/scapula alignment: Rounded shoulder and Head forward  Sitting: fair posture  Standing: good posture  Correction of posture: better with lumbar roll    MOVEMENT LOSS    Norms ROM Loss Initial   Flexion Fingers touch toes, sacral angle >/= 70 deg, uniform spinal curvature, posterior weight  shift  minimal loss   Extension ASIS surpasses toes, spine of scapulae surpasses heels, uniform spinal curve moderate loss   Side glide Right  moderate loss   Side glide Left  moderate loss   Rotation Right PT observes contralateral shoulder moderate loss   Rotation Left PT observes contralateral shoulder moderate loss     Lower Extremity Strength  Right LE  Left LE    Hip flexion: 4+/5 Hip flexion: 4+/5   Hip extension:  4+/5 Hip extension: 4+/5   Hip abduction: 4+/5 Hip abduction: 4+/5   Hip adduction:  4+/5 Hip adduction:  4+/5   Knee Flexion 5/5 Knee Flexion 5/5   Knee Extension 5/5 Knee Extension 5/5   Ankle dorsiflexion: 5/5 Ankle dorsiflexion: 5/5   Ankle plantarflexion: 5/5 Ankle plantarflexion: 5/5       GAIT:  Assistive Device used: none  Level of Assistance: independent  Patient displays the following gait deviations:  no gait deviations observed.     Special Tests:   Test Name  Test Result   Prone Instability Test (+)   SI Joint Provocation Test (--)   Straight Leg Raise (--)   Neural Tension Test (--)   Crossed Straight Leg Raise (--)   Walking on toes (--)   Walking on heels  (--)       NEUROLOGICAL SCREENING     Sensory deficit: intact    Reflexes:    Left Right   Patella Tendon 1+ 1+   Achilles Tendon 1+ 1+   Babinski  (--) (--)   Clonus (--) (--)     REPEATED TEST MOVEMENTS:    stiffness, no pain at rest  Baseline symptoms:  Repeated Flexion in Standing better   Repeated Extension in Standing better   Repeated Flexion in lying better   Repeated Extension in lying  better     Movement in general reduced stiffness.   No movement changes with repeated movements    Noted:bilat hips reduced ext rotation and flexion     [facility] 9/1/2022   Visit Number 1   VAS Pain Rating 5   Extension in Standing 10   Lumbar Extension Seat Pad 0   Femur Restraint 5   Top Dead Center 24   Counterweight 129   Lumbar Flexion 42   Lumbar Extension 76   Test Percent Below Normative Data 16 in flexed positions   Ice  - Z Lie (in min.) 10          Baseline Isometric Testing on Med X equipment: Testing administered by PT  Date of testin22  ROM 0-42 deg   Max Peak Torque 133    Min Peak Torque 76    Flex/Ext Ratio 2/1   % below normative data Normative strength 0-24 degrees with 16 %  below  below normative data at 36-42 degrees and she has pain in and out of flexion, which is indicative of strength issues in this range         Limitation/Restriction for FOTO 22 Survey    Therapist reviewed FOTO scores for Radha Fraga on 2022.   FOTO documents entered into [employer] - see Media section.    Limitation Score: 28% limited  Goal < 20 % limited            Treatment   Treatment Time In: 12:00  Treatment Time Out: 12:30 pm  Total Treatment time separate from Evaluation: 30 minutes      Radha received therapeutic exercises to develop/improve posture, lumbar/cervical ROM, strength and muscular endurance for 30 minutes including the following exercises:         Written Home Exercises Provided: yes.    Home Exercises Provided and Patient Education Provided   Home exercises include:ext in standing (left shoulder pain limits ext in lie) bridge, lower trunk rotation, adding ext rotation stretch and single knee to chest  Cardio program: start visit 5  Lifting education date:start visit 11  Posture/Lumbar roll: recommended    Exercises were reviewed and Radha was able to demonstrate them prior to the end of the session.  Radha demonstrated good  understanding of the education provided.     See EMR under Patient Instructions for exercises provided 2022.      Education provided:   - Patient received education regarding proper posture and body mechanics.  Patient was given top [facility] Visit 1 handouts which discuss what to expect in therapy, the purpose and opportunity for health coaching, the program,  wellness when discharged from therapy, back education and care specifically for posture seated, standing, lifting  correctly, components of exercise, importance of nutrition and hydration, and importance of sleep.   Information on lumbar rolls provided.  - Skyler roll tried, recommended, and purchase information was provided.    - Patient received a handout regarding anticipated muscular soreness following the isometric test and strategies for management were reviewed with patient including stretching, using ice and scheduled rest.   - Patient received education on the [facility] program, purpose of the isometric test, progression of back strengthening as well as wellness approach and systemic strengthening.  Details of the program were discussed.  Reviewed that patient should feel support/pressure from med ex restraints but no pain or discomfort and patient expressed understanding.  Med x dynamic exercise and baseline IM test performed with instructions to guide the patient safely through the isometric testing.  Patient informed to perform isometric test correctly, and safely, building best force they safely can and not pushing through pain.  Patient demonstrated understanding of information      Radha received cold pack for 10 minutes to low back.    Assessment   Radha is a 67 y.o. female referred to [facility] with a medical diagnosis of M54.50,G89.29 (ICD-10-CM) - Chronic midline low back pain without sciatica.   Pt presents with reported intermittent low back pain  that is reproduced every morning and with long time sitting and reduced with movement in general, and responding to extension, and flexion here indicating directional preference of movement, with extension based stretches started today to determine effect, will also add flexion to recover range, and will also add hip stretching due to tight hip musculature.   Upon physical assessment, pt demonstrates slouched posturing in sitting, mild hip weakness bilaterally, and trunk and hip mobility deficits. Upon further local examination, hip, lumbar, and  thoracic rotation were all limited significantly. Per lumbar MedX testing, pt was 16%  below in mid to flexed positions with corresponds to her inability to go from flexion to extension without discomfort in strength when compared to those of  same age/height/weight/gender. All of the above noted supports potential lumbar classification as a pattern 1 PEP  with recurrent/ or consistent symptoms, thus pt is a good candidate for the [facility] Program. Pt would benefit from LE and trunk mobility training, stability training,  improved cardiovascular and muscular endurance, neuromuscular re-education for posture, coordination, and muscular recruitment and education on positional offloading techniques to decrease the intensity and frequency of flare-ups.     Pain Pattern: 1 PEP       Pt prognosis is Excellent.   Pt will benefit from skilled outpatient Physical Therapy to address the deficits stated above and in the chart below, provide pt/family education, and to maximize pt's level of independence. Based on the above history and physical examination an active physical therapy program is recommended.  Pt will continue to benefit from skilled outpatient physical therapy to address the deficits listed below in the chart, provide pt/family education and to maximize pt's level of independence in the home and community environment. .       Plan of care discussed with patient: Yes  Pt's spiritual, cultural and educational needs considered and patient is agreeable to the plan of care and goals as stated below:     Anticipated Barriers for therapy: our schedule for scheduling    PT Evaluation Completed? Yes    Medical necessity is demonstrated by the following problem list.    Pt presents with the following impairments:     History  Co-morbidities and personal factors that may impact the plan of care Co-morbidities:   Morning stiffness every morning    Personal Factors:   Fear of hurting herself at the gym   mod    Examination  Body Structures and Functions, activity limitations and participation restrictions that may impact the plan of care Body Regions:   back  lower extremities    Body Systems:    gross symmetry  ROM  strength  transfers  transitions  motor control    Participation Restrictions:   attending    Activity limitations:   Learning and applying knowledge  no deficits    General Tasks and Commands  no deficits    Communication  no deficits    Mobility  lifting and carrying objects  walking  driving (bike, car, motorcycle)    Self care  looking after one's health    Domestic Life  shopping  cooking  doing house work (cleaning house, washing dishes, laundry)    Interactions/Relationships  no deficits    Life Areas  no deficits    Community and Social Life  no deficits         moderate   Clinical Presentation stable and uncomplicated moderate   Decision Making/ Complexity Score: moderate       GOALS: Pt is in agreement with the following goals.    Short term goals:  6 weeks or 10 visits   1.  Pt will demonstrate increased lumbar ROM by at least 3 degrees from the initial ROM value with improvements noted in functional ROM and ability to perform ADLs.  (approp and ongoing)  2.  Pt will demonstrate increased MedX average isometric strength value  by 10% from initial test resulting in improved ability to perform bending, lifting, and carrying activities safely, confidently.  (approp and ongoing)  3.  Patient report a reduction in worst pain score by 1-2 points for improved tolerance for 3/10 at worst.  (approp and ongoing)  4.  Pt able to perform HEP correctly with minimal cueing or supervision from therapist to encourage independent management of symptoms. (approp and ongoing)      Long term goals: 10 weeks or 20 visits   1. Pt will demonstrate increased lumbar ROM by at least 6 degrees from initial ROM value, resulting in improved ability to perform functional fwd bending while standing and sitting. (approp and  "ongoing)  2. Pt will demonstrate increased MedX average isometric strength value  by 20% from initial test resulting in improved ability to perform bending, lifting, and carrying activities safely, confidently.  (approp and ongoing)  3. Pt to demonstrate ability to independently control and reduce their pain through posture positioning and mechanical movements throughout a typical day.  (approp and ongoing)  4.  Pt will demonstrate reduced pain and improved functional outcomes as reported on the FOTO by reaching a limitation score of < or = 20% or less in order to demonstrate subjective improvement in pt's condition.    (approp and ongoing)  5. Pt will demonstrate independence with the HEP at discharge  (approp and ongoing)  6.  Return to gym, feel stronger, get off the floor with ease  (approp and ongoing)      Plan   Outpatient physical therapy 2x week for 10 weeks or 20 visits to include the following:   - Patient education  - Therapeutic exercise  - Manual therapy  - Performance testing   - Neuromuscular Re-education  - Therapeutic activity   - Modalities    Pt may be seen by PTA as part of the rehabilitation team.     Therapist: Sherron Cesar, PT  9/1/2022    "I certify the need for these services furnished under this plan of treatment and while under my care."    ____________________________________  Physician/Referring Practitioner    _______________  Date of Signature         "

## 2022-09-01 NOTE — PATIENT INSTRUCTIONS
Back Exercises: Lower Back Rotation    To start, lie on your back with your knees bent and feet flat on the floor. Dont press your neck or lower back to the floor. Breathe deeply. You should feel comfortable and relaxed in this position.

## 2022-09-02 ENCOUNTER — CLINICAL SUPPORT (OUTPATIENT)
Dept: REHABILITATION | Facility: HOSPITAL | Age: 67
End: 2022-09-02
Attending: FAMILY MEDICINE
Payer: MEDICARE

## 2022-09-02 DIAGNOSIS — M25.69 DECREASED ROM OF TRUNK AND BACK: Primary | ICD-10-CM

## 2022-09-02 DIAGNOSIS — R29.898 DECREASED STRENGTH OF TRUNK AND BACK: ICD-10-CM

## 2022-09-02 PROCEDURE — 97110 THERAPEUTIC EXERCISES: CPT | Mod: PN,CQ

## 2022-09-02 NOTE — PROGRESS NOTES
[facility] Physical Therapy Treatment      Name: Radha Fraga  Clinic Number: [id number]    Therapy Diagnosis:   Encounter Diagnoses   Name Primary?    Decreased ROM of trunk and back Yes    Decreased strength of trunk and back      Physician: Wilber Eduardo MD    Visit Date: 2022    Physician Orders: PT Eval and Treat    Medical Diagnosis from Referral: M54.50,G89.29 (ICD-10-CM) - Chronic midline low back pain without sciatica  Evaluation Date: 2022  Authorization Period Expiration: 24  Plan of Care Expiration: 22  Reassessment Due: 10/1/22  Visit # / Visits authorized:     Time In: 927AM  Time Out: 1027  Total Billable Time: 50 minutes  Insurance type:  Fee for service Insurance Patient    Precautions:  standard     Pattern of pain determined: 1 PEP  Subjective   Radha reports she is feeling stiffness and not really pain. She did do the HEP and she feel like her body wants to move. .      Patient reports tolerating previous visit well.  Patient reports their pain to be 3/10 on a 0-10 scale with 0 being no pain and 10 being the worst pain imaginable.  Pain Location: low back     Occupation: retired  Leisure: read      Pts goals: get stronger and stop aching     Objective     Baseline Isometric Testing on Med X equipment: Testing administered by PT  Date of testin22  ROM 0-42 deg   Max Peak Torque 133    Min Peak Torque 76    Flex/Ext Ratio 2/1   % below normative data Normative strength 0-24 degrees with 16 %  below  below normative data at 36-42 degrees and she has pain in and out of flexion, which is indicative of strength issues in this range            Limitation/Restriction for FOTO 22 Survey     Therapist reviewed FOTO scores for Radha Fraga on 2022.   FOTO documents entered into [employer] - see Media section.     Limitation Score: 28% limited  Goal < 20 % limited           Treatment    Pt was instructed in and performed the following:     Radha cuca  "therapeutic exercises to develop/improved posture, cardiovascular endurance, muscular endurance, lumbar/cervical ROM, strength and muscular endurance for 60 minutes including the following exercises:       HEP:  Prone on elbows x 1'  Prone press up x 10  Standing extension x 10   Bridges 2 x 15  LTR x 10 ea    HEP Review for next week:  DKTC  SKTC  Seated FWD flexion  Supine ER stretch 2 x 20"      [facility] 9/2/2022   Visit Number 2   VAS Pain Rating 3   Treadmill Time (in min.) 10   Extension in Lying 10   Extension in Standing 10   Flexion in Lying 10   Flexion in Sitting 10   Lumbar Extension Seat Pad -   Femur Restraint -   Top Dead Center -   Counterweight -   Lumbar Flexion -   Lumbar Extension -   Test Percent Below Normative Data -   Lumbar Weight 50   Repetitions 20   Rating of Perceived Exertion 2   Ice - Z Lie (in min.) 10             Peripheral muscle strengthening which included 1 set of 15-20 repetitions at a slow, controlled 10-13 second per rep pace focused on strengthening supporting musculature for improved body mechanics and functional mobility.  Pt and therapist focused on proper form during treatment to ensure optimal strengthening of each targeted muscle group.  Machines were utilized including torso rotation, leg extension, leg curl, hip abd, hip add, and leg press.       Radha received the following manual therapy techniques: Manual traction, Myofacial release, and Soft tissue Mobilization were applied for 00 minutes.       Home Exercises Provided and Patient Education Provided   Stretching: LAM, SIE, Prone press up  Strengthening: Bridges  Cardio program (V5): V5  Lifting education (V11): V11  Posture/ Using Lumbar Roll: Encouraged 9/2/22    Education provided:   - Program progression  - DOMS    Written Home Exercises Provided: Patient instructed to cont prior HEP.  Exercises were reviewed and Radha was able to demonstrate them prior to the end of the session.  Radha demonstrated " good  understanding of the education provided.     See EMR under Patient Instructions for exercises provided 9/1/2022.     Assessment   Radha tolerated her first treatment session well. Reviewed extension biased HEP with a positive response. Also reviewed flexion biased exercises and encourage pt to perform following next week to see which movement provides the most relief. Pt reports a recent shoulder injury and after attempting to introduced EU Matrix which caused pain it was deferred. Lower extremity Matrix was introduced without issue. MedX was performed at 50ft lbs with 20 reps performed at an exertion rating of 2/10. Recommend a 10% increase next visit.       Patient is making good progress towards established goals.  Pt will continue to benefit from skilled outpatient physical therapy to address the deficits stated in the impairment chart, provide pt/family education and to maximize pt's level of independence in the home and community environment.     Anticipated Barriers for therapy: our schedule for scheduling  Pt's spiritual, cultural and educational needs considered and pt agreeable to plan of care and goals as stated below:       GOALS: Pt is in agreement with the following goals.     Short term goals:  6 weeks or 10 visits   1.  Pt will demonstrate increased lumbar ROM by at least 3 degrees from the initial ROM value with improvements noted in functional ROM and ability to perform ADLs.  (approp and ongoing)  2.  Pt will demonstrate increased MedX average isometric strength value  by 10% from initial test resulting in improved ability to perform bending, lifting, and carrying activities safely, confidently.  (approp and ongoing)  3.  Patient report a reduction in worst pain score by 1-2 points for improved tolerance for 3/10 at worst.  (approp and ongoing)  4.  Pt able to perform HEP correctly with minimal cueing or supervision from therapist to encourage independent management of symptoms. (approp and  ongoing)        Long term goals: 10 weeks or 20 visits   1. Pt will demonstrate increased lumbar ROM by at least 6 degrees from initial ROM value, resulting in improved ability to perform functional fwd bending while standing and sitting. (approp and ongoing)  2. Pt will demonstrate increased MedX average isometric strength value  by 20% from initial test resulting in improved ability to perform bending, lifting, and carrying activities safely, confidently.  (approp and ongoing)  3. Pt to demonstrate ability to independently control and reduce their pain through posture positioning and mechanical movements throughout a typical day.  (approp and ongoing)  4.  Pt will demonstrate reduced pain and improved functional outcomes as reported on the FOTO by reaching a limitation score of < or = 20% or less in order to demonstrate subjective improvement in pt's condition.    (approp and ongoing)  5. Pt will demonstrate independence with the HEP at discharge  (approp and ongoing)  6.  Return to gym, feel stronger, get off the floor with ease  (approp and ongoing)  Plan   Continue with established Plan of Care towards established PT goals.     Aden Peterson, PTA  09/02/2022

## 2022-09-02 NOTE — PROGRESS NOTES
[facility]      Name: Radha Fraga  Clinic Number: [id number]    Therapy Diagnosis: No diagnosis found.  Physician: Wilber Eduardo MD    Visit Date: 9/2/2022    Physician Orders: ***  Medical Diagnosis: ***  Evaluation Date: ***  Authorization Period Expiration: ***  Reassessment Due: ***  Plan of Care Certification Period: ***  Visit #/Visits authorized: ***/ ***     Time In: ***  Time Out: ***  Total Billable Time: *** minutes  Insurance type:  {Insurance type:41033}    Precautions: {IP WOUND PRECAUTIONS OHS:24171}    Pattern of pain determined: ***    Subjective   Radha reports {AMB PT PROGRESS SUBJECTIVE REPORTS:96596}.      Patient reports tolerating previous visit ***  Patient reports their pain to be {NUMBERS 1-10:57180}/10 on a 0-10 scale with 0 being no pain and 10 being the worst pain imaginable.  Pain Location: ***     Work and leisure: ***  Pt goals: ***    Objective     Baseline IM Testing Results:   Date of testing: ***  ROM *** deg   Max Peak Torque ***    Min Peak Torque ***    Flex/Ext Ratio ***   % below normative data ***     Outcomes:  Initial score:  Visit 5 score:  Goal:      Treatment    Pt was instructed in and performed the following:     Radha received therapeutic exercises to develop/improved posture, cardiovascular endurance, muscular endurance, lumbar/cervical ROM, strength and muscular endurance for *** minutes including the following exercises:       ***flowsheet        Peripheral muscle strengthening which included 1 set of 15-20 repetitions at a slow, controlled 10-13 second per rep pace focused on strengthening supporting musculature for improved body mechanics and functional mobility.  Pt and therapist focused on proper form during treatment to ensure optimal strengthening of each targeted muscle group.  Machines were utilized including torso rotation, chest press, rowing, biceps, and triceps. Leg extension, leg curl, hip abd, hip add, and leg  "press added visit 3       Radha received the following manual therapy techniques: {AMB PT PROGRESS MANUAL THERAPY:36684} were applied to the: *** for *** minutes.       Home Exercises Provided and Patient Education Provided   Stretching: ***  Strengthening: ***  Cardio program (V5): ***  Lifting education (V11):***  Posture/ Using Lumbar Roll:***    Education provided:   - ***    Written Home Exercises Provided: {Blank single:83420::"yes","Patient instructed to cont prior HEP"}.  Exercises were reviewed and Radha was able to demonstrate them prior to the end of the session.  Radha demonstrated {Desc; good/fair/poor:54207} understanding of the education provided.     See EMR under {Blank single:04460::"Media","Patient Instructions"} for exercises provided {Blank single:19197::"9/2/2022","prior visit"}.          Assessment       Patient is making {GOOD FAIR POOR:68051} progress towards established goals.  Pt will continue to benefit from skilled outpatient physical therapy to address the deficits stated in the impairment chart, provide pt/family education and to maximize pt's level of independence in the home and community environment.     Anticipated Barriers for therapy: ***  Pt's spiritual, cultural and educational needs considered and pt agreeable to plan of care and goals as stated below:             Goals:         Plan   Continue with established Plan of Care towards established PT goals.     Physical Therapy Daily Treatment Note     Name: Radha Fraga  Clinic Number: [id number]    Therapy Diagnosis: No diagnosis found.  Physician: Wilber Eduardo MD    Visit Date: 9/2/2022    Physician Orders: ***  Medical Diagnosis: ***  Evaluation Date: ***  Authorization Period Expiration: ***  Plan of Care Certification Period: ***  Visit #/Visits authorized: ***/ ***     Time In: ***  Time Out: ***  Total Billable Time: *** minutes    Precautions: {IP WOUND PRECAUTIONS OHS:19206}    Subjective     Pt reports: ***.  She " "{Actions; was/was not:64027} compliant with home exercise program.  Response to previous treatment: ***  Functional change: ***    Pain: {0-10:13586::"0"}/10  Location: {RIGHT/LEFT/BILATERAL:69476} {LOCATION ON BODY:18834}     Objective     Radha received therapeutic exercises to develop {AMB PT PROGRESS OBJECTIVE:03951} for *** minutes including:  ***    Radha received the following manual therapy techniques: {AMB PT PROGRESS MANUAL THERAPY:60708} were applied to the: *** for *** minutes, including:  ***    Radha participated in neuromuscular re-education activities to improve: {AMB PT PROGRESS NEURO RE-ED:69462} for *** minutes. The following activities were included:  ***  ***    Radha received the following direct contact modalities after being cleared for contraindications: {AMB PT PROGRESS DIRECT CONTACT MODES:10706}    Radha received the following supervised modalities after being cleared for contradictions: {AMB PT SUPERVISED MODES:16445}    Radha received hot pack for *** minutes to ***.    Radha received cold pack for *** minutes to ***.      Home Exercises Provided and Patient Education Provided     Education provided:   - ***    Written Home Exercises Provided: {Blank single:58612::"yes","Patient instructed to cont prior HEP"}.  Exercises were reviewed and Radha was able to demonstrate them prior to the end of the session.  Radha demonstrated {Desc; good/fair/poor:95479} understanding of the education provided.     See EMR under {Blank single:59442::"Media","Patient Instructions"} for exercises provided {Blank single:29389::"9/2/2022","prior visit"}.    Assessment     ***  Radha {IS/IS NOT:54718} progressing well towards her goals.   Pt prognosis is {REHAB PROGNOSIS OHS:37561}.     Pt will continue to benefit from skilled outpatient physical therapy to address the deficits listed in the problem list box on initial evaluation, provide pt/family education and to maximize pt's level of independence in the " home and community environment.     Pt's spiritual, cultural and educational needs considered and pt agreeable to plan of care and goals.     Anticipated barriers to physical therapy: ***    Goals: ***    Plan     ***    Aden Peterson PTA

## 2022-09-08 ENCOUNTER — CLINICAL SUPPORT (OUTPATIENT)
Dept: REHABILITATION | Facility: HOSPITAL | Age: 67
End: 2022-09-08
Attending: FAMILY MEDICINE
Payer: MEDICARE

## 2022-09-08 DIAGNOSIS — R29.898 DECREASED STRENGTH OF TRUNK AND BACK: ICD-10-CM

## 2022-09-08 DIAGNOSIS — M25.69 DECREASED ROM OF TRUNK AND BACK: Primary | ICD-10-CM

## 2022-09-08 PROCEDURE — 97110 THERAPEUTIC EXERCISES: CPT | Mod: PN | Performed by: PHYSICAL MEDICINE & REHABILITATION

## 2022-09-08 NOTE — PROGRESS NOTES
[facility]      Name: Radha Fraga  Clinic Number: [id number]    Therapy Diagnosis:   Encounter Diagnoses   Name Primary?    Decreased ROM of trunk and back Yes    Decreased strength of trunk and back        Physician: Wilber Eduardo MD    Visit Date: 2022    Physician Orders: PT Eval and Treat    Medical Diagnosis from Referral: M54.50,G89.29 (ICD-10-CM) - Chronic midline low back pain without sciatica  Evaluation Date: 2022  Authorization Period Expiration: 24  Plan of Care Expiration: 22  Reassessment Due: 10/8/22  Visit # / Visits authorized: 3/ 20    Time In: 2:00 pm  Time Out: 3:00 pm  Total Billable Time: 56 minutes  Insurance type:  Fee for service Insurance Patient    Precautions:  standard     Pattern of pain determined: 1 PEP  Subjective   Radha reports she is feeling better already. She is doing her stretches and they help.   .      Patient reports tolerating previous visit well.  Patient reports their pain to be 0/10 on a 0-10 scale with 0 being no pain and 10 being the worst pain imaginable.  Pain Location: low back     Occupation: retired  Leisure: read      Pts goals: get stronger and stop aching     Objective     Baseline Isometric Testing on Med X equipment: Testing administered by PT  Date of testin22  ROM 0-42 deg   Max Peak Torque 133    Min Peak Torque 76    Flex/Ext Ratio 2/1   % below normative data Normative strength 0-24 degrees with 16 %  below  below normative data at 36-42 degrees and she has pain in and out of flexion, which is indicative of strength issues in this range            Limitation/Restriction for FOTO 22 Survey     Therapist reviewed FOTO scores for Radha Fraga on 2022.   FOTO documents entered into [employer] - see Media section.     Limitation Score: 28% limited  Goal < 20 % limited           Treatment    Pt was instructed in and performed the following:     Radha received therapeutic exercises to  develop/improved posture, cardiovascular endurance, muscular endurance, lumbar/cervical ROM, strength and muscular endurance for 60 minutes including the following exercises:         Prone on elbows x 1'  Prone press up x 10  Standing extension x 10   Bridges 2 x 15  LTR x 10 ea  Flexion in lie 10 reps  Ext rot stretch 10 reps 3 sec    add Review for next week:  [facility]  Seated FWD flexion        [facility] 9/8/2022   Visit Number 3   VAS Pain Rating 0   Treadmill Time (in min.) 10   Extension in Lying 10   Extension in Standing 10   Flexion in Lying 10   Flexion in Sitting 10   Lumbar Extension Seat Pad -   Femur Restraint -   Top Dead Center -   Counterweight -   Lumbar Flexion -   Lumbar Extension -   Test Percent Below Normative Data -   Lumbar Weight 50   Repetitions 20   Rating of Perceived Exertion 3   Ice - Z Lie (in min.) 10          Peripheral muscle strengthening which included 1 set of 15-20 repetitions at a slow, controlled 10-13 second per rep pace focused on strengthening supporting musculature for improved body mechanics and functional mobility.  Pt and therapist focused on proper form during treatment to ensure optimal strengthening of each targeted muscle group.  Machines were utilized including torso rotation, rowing, triceps,leg extension, leg curl, hip abd, hip add, and leg press.       Radha received the following manual therapy techniques: Manual traction, Myofacial release, and Soft tissue Mobilization were applied for 00 minutes.       Home Exercises Provided and Patient Education Provided   Stretching: LAM, SIE, Prone press up, flexion in lie  Strengthening: Bridges  Cardio program (V5): V5  Lifting education (V11): V11  Posture/ Using Lumbar Roll: Encouraged 9/2/22    Education provided:   - Program progression  - DOMS    Written Home Exercises Provided: Patient instructed to cont prior HEP.  Exercises were reviewed and Radha was able to demonstrate them prior to the end of the  session.  Radha demonstrated good  understanding of the education provided.     See EMR under Patient Instructions for exercises provided 9/1/2022.     Assessment   Radha tolerated her second  treatment session well. Reviewed extension biased HEP with a positive response. Also reviewed flexion biased exercises and encourage pt to perform to see which movement provides the most relief. Pt reports a recent shoulder injury but tolerating rowing and triceps without difficulty.  She was advised to let us know of any joint pain.   Lower extremity Matrix continued  without issue. MedX was performed at 50ft lbs with 20 reps performed at an exertion rating of 2/10. (Noted MedX machine reported 45 ft lbs 20 reps last visit) Recommend a 10% increase next visit.       Patient is making good progress towards established goals.  Pt will continue to benefit from skilled outpatient physical therapy to address the deficits stated in the impairment chart, provide pt/family education and to maximize pt's level of independence in the home and community environment.     Anticipated Barriers for therapy: our schedule for scheduling  Pt's spiritual, cultural and educational needs considered and pt agreeable to plan of care and goals as stated below:       GOALS: Pt is in agreement with the following goals.     Short term goals:  6 weeks or 10 visits   1.  Pt will demonstrate increased lumbar ROM by at least 3 degrees from the initial ROM value with improvements noted in functional ROM and ability to perform ADLs.  (approp and ongoing)  2.  Pt will demonstrate increased MedX average isometric strength value  by 10% from initial test resulting in improved ability to perform bending, lifting, and carrying activities safely, confidently.  (approp and ongoing)  3.  Patient report a reduction in worst pain score by 1-2 points for improved tolerance for 3/10 at worst.  (approp and ongoing)  4.  Pt able to perform HEP correctly with minimal  cueing or supervision from therapist to encourage independent management of symptoms. (approp and ongoing)        Long term goals: 10 weeks or 20 visits   1. Pt will demonstrate increased lumbar ROM by at least 6 degrees from initial ROM value, resulting in improved ability to perform functional fwd bending while standing and sitting. (approp and ongoing)  2. Pt will demonstrate increased MedX average isometric strength value  by 20% from initial test resulting in improved ability to perform bending, lifting, and carrying activities safely, confidently.  (approp and ongoing)  3. Pt to demonstrate ability to independently control and reduce their pain through posture positioning and mechanical movements throughout a typical day.  (approp and ongoing)  4.  Pt will demonstrate reduced pain and improved functional outcomes as reported on the FOTO by reaching a limitation score of < or = 20% or less in order to demonstrate subjective improvement in pt's condition.    (approp and ongoing)  5. Pt will demonstrate independence with the HEP at discharge  (approp and ongoing)  6.  Return to gym, feel stronger, get off the floor with ease  (approp and ongoing)  Plan   Continue with established Plan of Care towards established PT goals.     Sherron Cesar, PT  09/08/2022

## 2022-09-30 ENCOUNTER — CLINICAL SUPPORT (OUTPATIENT)
Dept: REHABILITATION | Facility: HOSPITAL | Age: 67
End: 2022-09-30
Attending: FAMILY MEDICINE
Payer: MEDICARE

## 2022-09-30 DIAGNOSIS — R29.898 DECREASED STRENGTH OF TRUNK AND BACK: ICD-10-CM

## 2022-09-30 DIAGNOSIS — M25.69 DECREASED ROM OF TRUNK AND BACK: Primary | ICD-10-CM

## 2022-09-30 PROCEDURE — 97110 THERAPEUTIC EXERCISES: CPT | Mod: PN

## 2022-09-30 NOTE — PROGRESS NOTES
[facility]      Name: Radha Fraga  Clinic Number: [id number]    Therapy Diagnosis:   Encounter Diagnoses   Name Primary?    Decreased ROM of trunk and back Yes    Decreased strength of trunk and back        Physician: Wilber Eduardo MD    Visit Date: 2022    Physician Orders: PT Eval and Treat    Medical Diagnosis from Referral: M54.50,G89.29 (ICD-10-CM) - Chronic midline low back pain without sciatica  Evaluation Date: 2022  Authorization Period Expiration: 24  Plan of Care Expiration: 22  Reassessment Due: 10/30/22  Visit # / Visits authorized:     Time In: 12:00 pm  Time Out: 1:00 pm  Total Billable Time: 60 minutes  Insurance type:  Fee for service Insurance Patient    Precautions: standard     Pattern of pain determined: 1 PEP  Subjective   Radha reports she is feeling a little achy but that is normal, getting the stretches in about 1x/day. She had some soreness after last visit.    Patient reports tolerating previous visit well.  Patient reports their pain to be 0/10 on a 0-10 scale with 0 being no pain and 10 being the worst pain imaginable.  Pain Location: low back     Occupation: retired  Leisure: read      Pts goals: get stronger and stop aching     Objective     Baseline Isometric Testing on Med X equipment: Testing administered by PT  Date of testin22  ROM 0-42 deg   Max Peak Torque 133    Min Peak Torque 76    Flex/Ext Ratio 2/1   % below normative data Normative strength 0-24 degrees with 16 %  below  below normative data at 36-42 degrees and she has pain in and out of flexion, which is indicative of strength issues in this range            Limitation/Restriction for FOTO 22 Survey     Therapist reviewed FOTO scores for Radha Fraga on 2022.   FOTO documents entered into [employer] - see Media section.     Limitation Score: 28% limited  Goal < 20 % limited           Treatment    Pt was instructed in and performed the following:  "    Radha received therapeutic exercises to develop/improved posture, cardiovascular endurance, muscular endurance, lumbar/cervical ROM, strength and muscular endurance for 60 minutes including the following exercises:       Prone press up x 10  Standing extension x 10   Bridges 2 x 15  LTR x 10 ea  Flexion in lie 10 reps  Ext rot stretch 10 reps 3 sec  SKTC 5"x10      add Review for next week:  Seated FWD flexion        [facility] 9/30/2022   Visit Number 4   VAS Pain Rating 2   Treadmill Time (in min.) 10   Extension in Lying 10   Extension in Standing 10   Flexion in Lying 10   Flexion in Sitting -   Lumbar Extension Seat Pad -   Femur Restraint -   Top Dead Center -   Counterweight -   Lumbar Flexion -   Lumbar Extension -   Test Percent Below Normative Data -   Lumbar Weight 52   Repetitions 15   Rating of Perceived Exertion 2   Ice - Z Lie (in min.) 10            Peripheral muscle strengthening which included 1 set of 15-20 repetitions at a slow, controlled 10-13 second per rep pace focused on strengthening supporting musculature for improved body mechanics and functional mobility.  Pt and therapist focused on proper form during treatment to ensure optimal strengthening of each targeted muscle group.  Machines were utilized including torso rotation, rowing, triceps,leg extension, leg curl, hip abd, hip add, and leg press.       Radha received the following manual therapy techniques: Manual traction, Myofacial release, and Soft tissue Mobilization were applied for 00 minutes.       Home Exercises Provided and Patient Education Provided   Stretching: LAM, SIE, Prone press up, flexion in lie  Strengthening: Bridges  Cardio program (V5): V5  Lifting education (V11): V11  Posture/ Using Lumbar Roll: Encouraged 9/2/22    Education provided:   - Program progression  - DOMS    Written Home Exercises Provided: Patient instructed to cont prior HEP.  Exercises were reviewed and Radha was able to demonstrate them " prior to the end of the session.  Radha demonstrated good  understanding of the education provided.     See EMR under Patient Instructions for exercises provided 9/1/2022.     Assessment   Radha presents to therapy with some aching in the back that got somewhat better as the therapy session progressed. Complete exercises with moderate cues for progression and execution of exercises. She reports some stiffness at the R hip flexor with stretching. Increased resistance on the lumbar medx by 5% and she was able to complete 15 repetitions at 2/10 RPE. Pt educated on exertion scale and goals for exertion. Plan to progress as tolerated       Patient is making good progress towards established goals.  Pt will continue to benefit from skilled outpatient physical therapy to address the deficits stated in the impairment chart, provide pt/family education and to maximize pt's level of independence in the home and community environment.     Anticipated Barriers for therapy: our schedule for scheduling  Pt's spiritual, cultural and educational needs considered and pt agreeable to plan of care and goals as stated below:       GOALS: Pt is in agreement with the following goals.     Short term goals:  6 weeks or 10 visits   1.  Pt will demonstrate increased lumbar ROM by at least 3 degrees from the initial ROM value with improvements noted in functional ROM and ability to perform ADLs.  (approp and ongoing)  2.  Pt will demonstrate increased MedX average isometric strength value  by 10% from initial test resulting in improved ability to perform bending, lifting, and carrying activities safely, confidently.  (approp and ongoing)  3.  Patient report a reduction in worst pain score by 1-2 points for improved tolerance for 3/10 at worst.  (approp and ongoing)  4.  Pt able to perform HEP correctly with minimal cueing or supervision from therapist to encourage independent management of symptoms. (approp and ongoing)        Long term  goals: 10 weeks or 20 visits   1. Pt will demonstrate increased lumbar ROM by at least 6 degrees from initial ROM value, resulting in improved ability to perform functional fwd bending while standing and sitting. (approp and ongoing)  2. Pt will demonstrate increased MedX average isometric strength value  by 20% from initial test resulting in improved ability to perform bending, lifting, and carrying activities safely, confidently.  (approp and ongoing)  3. Pt to demonstrate ability to independently control and reduce their pain through posture positioning and mechanical movements throughout a typical day.  (approp and ongoing)  4.  Pt will demonstrate reduced pain and improved functional outcomes as reported on the FOTO by reaching a limitation score of < or = 20% or less in order to demonstrate subjective improvement in pt's condition.    (approp and ongoing)  5. Pt will demonstrate independence with the HEP at discharge  (approp and ongoing)  6.  Return to gym, feel stronger, get off the floor with ease  (approp and ongoing)  Plan   Continue with established Plan of Care towards established PT goals.     Remi Palencia, PT  09/30/2022

## 2022-10-05 ENCOUNTER — CLINICAL SUPPORT (OUTPATIENT)
Dept: REHABILITATION | Facility: HOSPITAL | Age: 67
End: 2022-10-05
Attending: FAMILY MEDICINE
Payer: MEDICARE

## 2022-10-05 DIAGNOSIS — M25.69 DECREASED ROM OF TRUNK AND BACK: Primary | ICD-10-CM

## 2022-10-05 DIAGNOSIS — R29.898 DECREASED STRENGTH OF TRUNK AND BACK: ICD-10-CM

## 2022-10-05 PROCEDURE — 97110 THERAPEUTIC EXERCISES: CPT | Mod: PN,CQ

## 2022-10-05 NOTE — PROGRESS NOTES
[facility]      Name: Radha Fraga  Clinic Number: [id number]    Therapy Diagnosis:   Encounter Diagnoses   Name Primary?    Decreased ROM of trunk and back Yes    Decreased strength of trunk and back          Physician: Wilber Eduardo MD    Visit Date: 10/5/2022    Physician Orders: PT Eval and Treat    Medical Diagnosis from Referral: M54.50,G89.29 (ICD-10-CM) - Chronic midline low back pain without sciatica  Evaluation Date: 2022  Authorization Period Expiration: 24  Plan of Care Expiration: 22  Reassessment Due: 10/30/22  Visit # / Visits authorized:     Time In: 1230PM  Time Out: 130PM  Total Billable Time: 60 minutes  Insurance type:  Fee for service Insurance Patient    Precautions: standard     Pattern of pain determined: 1 PEP  Subjective   Radha reports she is feeling a little achy not really pain. She has been doing her exercises once a day. She knows she should be doing them more. Currently only walking maybe once a week when her grandchild's dog is over. She knows she could find 10 minutes a day to walk she just got out of the habit since Covid.     Patient reports tolerating previous visit with some soreness in hip flexors  Patient reports their pain to be 0/10 on a 0-10 scale with 0 being no pain and 10 being the worst pain imaginable.  Pain Location: low back     Occupation: retired  Leisure: read      Pts goals: get stronger and stop aching     Objective     Baseline Isometric Testing on Med X equipment: Testing administered by PT  Date of testin22  ROM 0-42 deg   Max Peak Torque 133    Min Peak Torque 76    Flex/Ext Ratio 2/1   % below normative data Normative strength 0-24 degrees with 16 %  below  below normative data at 36-42 degrees and she has pain in and out of flexion, which is indicative of strength issues in this range            Limitation/Restriction for FOTO 22 Survey     Therapist reviewed FOTO scores for Radha CARRENO  "[place] on 9/1/2022.   FOTO documents entered into [employer] - see Media section.     Limitation Score: 28% limited  Goal < 20 % limited           Treatment    Pt was instructed in and performed the following:     Radha received therapeutic exercises to develop/improved posture, cardiovascular endurance, muscular endurance, lumbar/cervical ROM, strength and muscular endurance for 60 minutes including the following exercises:       Prone press up x 10  Standing extension x 10   Bridges 2 x 15  LTR x 10 ea  Flexion in lie 10 reps  Ext rot stretch 10 reps 3 sec  SKTC 5"x10  +seated flexion ways x 5 ea    [facility] 10/5/2022   Visit Number 5   VAS Pain Rating 1.5   Treadmill Time (in min.) 10   Extension in Lying 10   Extension in Standing 10   Flexion in Lying 10   Flexion in Sitting -   Lumbar Extension Seat Pad -   Femur Restraint -   Top Dead Center -   Counterweight -   Lumbar Flexion -   Lumbar Extension -   Test Percent Below Normative Data -   Lumbar Weight 52   Repetitions 18   Rating of Perceived Exertion 2   Ice - Z Lie (in min.) 10         Peripheral muscle strengthening which included 1 set of 15-20 repetitions at a slow, controlled 10-13 second per rep pace focused on strengthening supporting musculature for improved body mechanics and functional mobility.  Pt and therapist focused on proper form during treatment to ensure optimal strengthening of each targeted muscle group.  Machines were utilized including torso rotation, rowing, triceps,leg extension, leg curl, hip abd, hip add, and leg press.       Radha received the following manual therapy techniques: Manual traction, Myofacial release, and Soft tissue Mobilization were applied for 00 minutes.       Home Exercises Provided and Patient Education Provided   Stretching: LAM, SIE, Prone press up, flexion in lie  Strengthening: Bridges  Cardio program (V5): encouraged and provided handout 10/5/22. Pt states she can do 20 mins a day 3 times a " week.  Lifting education (V11): V11  Posture/ Using Lumbar Roll: Encouraged 9/2/22    Education provided:   - Program progression  - DOMS    Written Home Exercises Provided: Patient instructed to cont prior HEP.  Exercises were reviewed and Radha was able to demonstrate them prior to the end of the session.  Radha demonstrated good  understanding of the education provided.     See EMR under Patient Instructions for exercises provided 9/1/2022.     Assessment   Radha tolerated session well. Reviewed seated flexion with pt reporting seated flexion feels like it targets where she feels the most discomfort. Pt was provided cardio handout with pt committing to 20 minutes 3x a week. MedX was performed at 52ft lbs with 18 reps performed at an exertion rating of 2/10. Pt continues to progress on Matrix without issue.       Patient is making good progress towards established goals.  Pt will continue to benefit from skilled outpatient physical therapy to address the deficits stated in the impairment chart, provide pt/family education and to maximize pt's level of independence in the home and community environment.     Anticipated Barriers for therapy: our schedule for scheduling  Pt's spiritual, cultural and educational needs considered and pt agreeable to plan of care and goals as stated below:       GOALS: Pt is in agreement with the following goals.     Short term goals:  6 weeks or 10 visits   1.  Pt will demonstrate increased lumbar ROM by at least 3 degrees from the initial ROM value with improvements noted in functional ROM and ability to perform ADLs.  (approp and ongoing)  2.  Pt will demonstrate increased MedX average isometric strength value  by 10% from initial test resulting in improved ability to perform bending, lifting, and carrying activities safely, confidently.  (approp and ongoing)  3.  Patient report a reduction in worst pain score by 1-2 points for improved tolerance for 3/10 at worst.  (approp and  ongoing)  4.  Pt able to perform HEP correctly with minimal cueing or supervision from therapist to encourage independent management of symptoms. (approp and ongoing)        Long term goals: 10 weeks or 20 visits   1. Pt will demonstrate increased lumbar ROM by at least 6 degrees from initial ROM value, resulting in improved ability to perform functional fwd bending while standing and sitting. (approp and ongoing)  2. Pt will demonstrate increased MedX average isometric strength value  by 20% from initial test resulting in improved ability to perform bending, lifting, and carrying activities safely, confidently.  (approp and ongoing)  3. Pt to demonstrate ability to independently control and reduce their pain through posture positioning and mechanical movements throughout a typical day.  (approp and ongoing)  4.  Pt will demonstrate reduced pain and improved functional outcomes as reported on the FOTO by reaching a limitation score of < or = 20% or less in order to demonstrate subjective improvement in pt's condition.    (approp and ongoing)  5. Pt will demonstrate independence with the HEP at discharge  (approp and ongoing)  6.  Return to gym, feel stronger, get off the floor with ease  (approp and ongoing)  Plan   Continue with established Plan of Care towards established PT goals.     Aden Peterson, PTA  10/05/2022

## 2022-10-17 ENCOUNTER — OFFICE VISIT (OUTPATIENT)
Dept: OPHTHALMOLOGY | Facility: CLINIC | Age: 67
End: 2022-10-17
Payer: MEDICARE

## 2022-10-17 DIAGNOSIS — H40.1131 PRIMARY OPEN-ANGLE GLAUCOMA, BILATERAL, MILD STAGE: Primary | ICD-10-CM

## 2022-10-17 DIAGNOSIS — H02.054 TRICHIASIS OF LEFT UPPER EYELID: ICD-10-CM

## 2022-10-17 DIAGNOSIS — H25.13 NUCLEAR SCLEROTIC CATARACT, BILATERAL: ICD-10-CM

## 2022-10-17 PROCEDURE — 92133 POSTERIOR SEGMENT OCT OPTIC NERVE(OCULAR COHERENCE TOMOGRAPHY) - OU - BOTH EYES: ICD-10-PCS | Mod: S$GLB,,, | Performed by: OPHTHALMOLOGY

## 2022-10-17 PROCEDURE — 3288F PR FALLS RISK ASSESSMENT DOCUMENTED: ICD-10-PCS | Mod: CPTII,S$GLB,, | Performed by: OPHTHALMOLOGY

## 2022-10-17 PROCEDURE — 99999 PR PBB SHADOW E&M-EST. PATIENT-LVL II: ICD-10-PCS | Mod: PBBFAC,,, | Performed by: OPHTHALMOLOGY

## 2022-10-17 PROCEDURE — 4010F PR ACE/ARB THEARPY RXD/TAKEN: ICD-10-PCS | Mod: CPTII,S$GLB,, | Performed by: OPHTHALMOLOGY

## 2022-10-17 PROCEDURE — 67820 REVISE EYELASHES: CPT | Mod: LT,S$GLB,, | Performed by: OPHTHALMOLOGY

## 2022-10-17 PROCEDURE — 1101F PT FALLS ASSESS-DOCD LE1/YR: CPT | Mod: CPTII,S$GLB,, | Performed by: OPHTHALMOLOGY

## 2022-10-17 PROCEDURE — 67820 PR REVISE EYELASHES,FORCEPS: ICD-10-PCS | Mod: LT,S$GLB,, | Performed by: OPHTHALMOLOGY

## 2022-10-17 PROCEDURE — 1160F RVW MEDS BY RX/DR IN RCRD: CPT | Mod: CPTII,S$GLB,, | Performed by: OPHTHALMOLOGY

## 2022-10-17 PROCEDURE — 4010F ACE/ARB THERAPY RXD/TAKEN: CPT | Mod: CPTII,S$GLB,, | Performed by: OPHTHALMOLOGY

## 2022-10-17 PROCEDURE — 92014 COMPRE OPH EXAM EST PT 1/>: CPT | Mod: 25,S$GLB,, | Performed by: OPHTHALMOLOGY

## 2022-10-17 PROCEDURE — 1160F PR REVIEW ALL MEDS BY PRESCRIBER/CLIN PHARMACIST DOCUMENTED: ICD-10-PCS | Mod: CPTII,S$GLB,, | Performed by: OPHTHALMOLOGY

## 2022-10-17 PROCEDURE — 92133 CPTRZD OPH DX IMG PST SGM ON: CPT | Mod: S$GLB,,, | Performed by: OPHTHALMOLOGY

## 2022-10-17 PROCEDURE — 1159F PR MEDICATION LIST DOCUMENTED IN MEDICAL RECORD: ICD-10-PCS | Mod: CPTII,S$GLB,, | Performed by: OPHTHALMOLOGY

## 2022-10-17 PROCEDURE — 1126F AMNT PAIN NOTED NONE PRSNT: CPT | Mod: CPTII,S$GLB,, | Performed by: OPHTHALMOLOGY

## 2022-10-17 PROCEDURE — 1159F MED LIST DOCD IN RCRD: CPT | Mod: CPTII,S$GLB,, | Performed by: OPHTHALMOLOGY

## 2022-10-17 PROCEDURE — 99999 PR PBB SHADOW E&M-EST. PATIENT-LVL II: CPT | Mod: PBBFAC,,, | Performed by: OPHTHALMOLOGY

## 2022-10-17 PROCEDURE — 92014 PR EYE EXAM, EST PATIENT,COMPREHESV: ICD-10-PCS | Mod: 25,S$GLB,, | Performed by: OPHTHALMOLOGY

## 2022-10-17 PROCEDURE — 1126F PR PAIN SEVERITY QUANTIFIED, NO PAIN PRESENT: ICD-10-PCS | Mod: CPTII,S$GLB,, | Performed by: OPHTHALMOLOGY

## 2022-10-17 PROCEDURE — 3288F FALL RISK ASSESSMENT DOCD: CPT | Mod: CPTII,S$GLB,, | Performed by: OPHTHALMOLOGY

## 2022-10-17 PROCEDURE — 1101F PR PT FALLS ASSESS DOC 0-1 FALLS W/OUT INJ PAST YR: ICD-10-PCS | Mod: CPTII,S$GLB,, | Performed by: OPHTHALMOLOGY
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
## 2022-10-19 ENCOUNTER — CLINICAL SUPPORT (OUTPATIENT)
Dept: REHABILITATION | Facility: HOSPITAL | Age: 67
End: 2022-10-19
Attending: FAMILY MEDICINE
Payer: MEDICARE

## 2022-10-19 DIAGNOSIS — R29.898 DECREASED STRENGTH OF TRUNK AND BACK: ICD-10-CM

## 2022-10-19 DIAGNOSIS — M25.69 DECREASED ROM OF TRUNK AND BACK: Primary | ICD-10-CM

## 2022-10-19 PROCEDURE — 97110 THERAPEUTIC EXERCISES: CPT | Mod: PN,CQ

## 2022-10-19 NOTE — PROGRESS NOTES
[facility]      Name: Radha Fraga  Clinic Number: [id number]    Therapy Diagnosis:   Encounter Diagnoses   Name Primary?    Decreased ROM of trunk and back Yes    Decreased strength of trunk and back            Physician: Wilber Eduardo MD    Visit Date: 10/19/2022    Physician Orders: PT Eval and Treat    Medical Diagnosis from Referral: M54.50,G89.29 (ICD-10-CM) - Chronic midline low back pain without sciatica  Evaluation Date: 2022  Authorization Period Expiration: 24  Plan of Care Expiration: 22  Reassessment Due: 10/30/22  Visit # / Visits authorized:     Time In: 1230PM  Time Out: 126PM  Total Billable Time: 46 minutes  Insurance type:  Fee for service Insurance Patient    Precautions: standard     Pattern of pain determined: 1 PEP  Subjective   Radha reports she is feeling really good actually. She hasn't had any pain really and has even been painting her base boards and house and no issues to report.     Patient reports tolerating previous visit fine.  Patient reports their pain to be 0/10 on a 0-10 scale with 0 being no pain and 10 being the worst pain imaginable.  Pain Location: low back     Occupation: retired  Leisure: read      Pts goals: get stronger and stop aching     Objective     Baseline Isometric Testing on Med X equipment: Testing administered by PT  Date of testin22  ROM 0-42 deg   Max Peak Torque 133    Min Peak Torque 76    Flex/Ext Ratio 2/1   % below normative data Normative strength 0-24 degrees with 16 %  below  below normative data at 36-42 degrees and she has pain in and out of flexion, which is indicative of strength issues in this range            Limitation/Restriction for FOTO 22 Survey     Therapist reviewed FOTO scores for Radha Fraga on 2022.   FOTO documents entered into [employer] - see Media section.     Limitation Score: 28% limited  Goal < 20 % limited           Treatment    Pt was instructed in and  "performed the following:     Radha received therapeutic exercises to develop/improved posture, cardiovascular endurance, muscular endurance, lumbar/cervical ROM, strength and muscular endurance for 56 minutes including the following exercises:       Prone press up x 10  Bridges 2 x 15  LTR x 10 ea  Flexion in lie 10 reps  Ext rot stretch 10 reps 3 sec  SKTC 5"x10  Seated flexion ways x 5 ea    [facility] 10/19/2022   Visit Number 6   VAS Pain Rating 0   Treadmill Time (in min.) 10   Speed 3   Extension in Lying 10   Extension in Standing 10   Flexion in Lying 10   Flexion in Sitting 15   Lumbar Extension Seat Pad -   Femur Restraint -   Top Dead Center -   Counterweight -   Lumbar Flexion -   Lumbar Extension -   Test Percent Below Normative Data -   Lumbar Weight 52   Repetitions 20   Rating of Perceived Exertion 2   Ice - Z Lie (in min.) 10         Peripheral muscle strengthening which included 1 set of 15-20 repetitions at a slow, controlled 10-13 second per rep pace focused on strengthening supporting musculature for improved body mechanics and functional mobility.  Pt and therapist focused on proper form during treatment to ensure optimal strengthening of each targeted muscle group.  Machines were utilized including torso rotation, rowing, triceps,leg extension, leg curl, hip abd, hip add, and leg press.       Radha received the following manual therapy techniques: Null      Home Exercises Provided and Patient Education Provided   Stretching: LAM, SIE, Prone press up, flexion in lie  Strengthening: Bridges  Cardio program (V5): encouraged and provided handout 10/5/22. Pt states she can do 20 mins a day 3 times a week.  Lifting education (V11): V11  Posture/ Using Lumbar Roll: Encouraged 9/2/22    Education provided:   - Program progression  - DOMS    Written Home Exercises Provided: Patient instructed to cont prior HEP.  Exercises were reviewed and Radha was able to demonstrate them prior to the end of " the session.  Radha demonstrated good  understanding of the education provided.     See EMR under Patient Instructions for exercises provided 9/1/2022.     Assessment   Radha tolerated session well and reports to session without c/o pain. Continued previously prescribed as pt has not been to therapy in 2 weeks. Pt does report improvements in symptoms with the ability to perform more ADLs without pain. MedX was performed at 52ft lbs with 20 reps performed at an exertion rating of 2/10. No issues with Matrix peripheral strengthening.       Patient is making good progress towards established goals.  Pt will continue to benefit from skilled outpatient physical therapy to address the deficits stated in the impairment chart, provide pt/family education and to maximize pt's level of independence in the home and community environment.     Anticipated Barriers for therapy: our schedule for scheduling  Pt's spiritual, cultural and educational needs considered and pt agreeable to plan of care and goals as stated below:       GOALS: Pt is in agreement with the following goals.     Short term goals:  6 weeks or 10 visits   1.  Pt will demonstrate increased lumbar ROM by at least 3 degrees from the initial ROM value with improvements noted in functional ROM and ability to perform ADLs.  (approp and ongoing)  2.  Pt will demonstrate increased MedX average isometric strength value  by 10% from initial test resulting in improved ability to perform bending, lifting, and carrying activities safely, confidently.  (approp and ongoing)  3.  Patient report a reduction in worst pain score by 1-2 points for improved tolerance for 3/10 at worst.  (approp and ongoing)  4.  Pt able to perform HEP correctly with minimal cueing or supervision from therapist to encourage independent management of symptoms. (approp and ongoing)        Long term goals: 10 weeks or 20 visits   1. Pt will demonstrate increased lumbar ROM by at least 6 degrees from  initial ROM value, resulting in improved ability to perform functional fwd bending while standing and sitting. (approp and ongoing)  2. Pt will demonstrate increased MedX average isometric strength value  by 20% from initial test resulting in improved ability to perform bending, lifting, and carrying activities safely, confidently.  (approp and ongoing)  3. Pt to demonstrate ability to independently control and reduce their pain through posture positioning and mechanical movements throughout a typical day.  (approp and ongoing)  4.  Pt will demonstrate reduced pain and improved functional outcomes as reported on the FOTO by reaching a limitation score of < or = 20% or less in order to demonstrate subjective improvement in pt's condition.    (approp and ongoing)  5. Pt will demonstrate independence with the HEP at discharge  (approp and ongoing)  6.  Return to gym, feel stronger, get off the floor with ease  (approp and ongoing)  Plan   Continue with established Plan of Care towards established PT goals.     Aden Peterson, PTA  10/19/2022

## 2022-10-21 ENCOUNTER — CLINICAL SUPPORT (OUTPATIENT)
Dept: REHABILITATION | Facility: HOSPITAL | Age: 67
End: 2022-10-21
Attending: FAMILY MEDICINE
Payer: MEDICARE

## 2022-10-21 DIAGNOSIS — M25.69 DECREASED ROM OF TRUNK AND BACK: Primary | ICD-10-CM

## 2022-10-21 DIAGNOSIS — R29.898 DECREASED STRENGTH OF TRUNK AND BACK: ICD-10-CM

## 2022-10-21 PROCEDURE — 97110 THERAPEUTIC EXERCISES: CPT | Mod: PN

## 2022-10-21 NOTE — PROGRESS NOTES
[facility]      Name: Radha Fraga  Clinic Number: [id number]    Therapy Diagnosis:   Encounter Diagnoses   Name Primary?    Decreased ROM of trunk and back Yes    Decreased strength of trunk and back            Physician: Wilber Eduardo MD    Visit Date: 10/21/2022    Physician Orders: PT Eval and Treat    Medical Diagnosis from Referral: M54.50,G89.29 (ICD-10-CM) - Chronic midline low back pain without sciatica  Evaluation Date: 2022  Authorization Period Expiration: 24  Plan of Care Expiration: 22  Reassessment Due: 10/30/22  Visit # / Visits authorized:     Time In: 1100  Time Out: 12:10  Total Billable Time: 60 minutes  Insurance type:  Fee for service Insurance Patient    Precautions: standard     Pattern of pain determined: 1 PEP  Subjective   Radha reports she is a little sore this morning but overall she has had more energy.    Patient reports tolerating previous visit fine.  Patient reports their pain to be 0/10 on a 0-10 scale with 0 being no pain and 10 being the worst pain imaginable.  Pain Location: low back     Occupation: retired  Leisure: read      Pts goals: get stronger and stop aching     Objective     Baseline Isometric Testing on Med X equipment: Testing administered by PT  Date of testin22  ROM 0-42 deg   Max Peak Torque 133    Min Peak Torque 76    Flex/Ext Ratio 2/1   % below normative data Normative strength 0-24 degrees with 16 %  below  below normative data at 36-42 degrees and she has pain in and out of flexion, which is indicative of strength issues in this range            Limitation/Restriction for FOTO 22 Survey     Therapist reviewed FOTO scores for Radha Fraga on 2022.   FOTO documents entered into [employer] - see Media section.     Limitation Score: 28% limited  Goal < 20 % limited           Treatment    Pt was instructed in and performed the following:     Radha received therapeutic exercises to  "develop/improved posture, cardiovascular endurance, muscular endurance, lumbar/cervical ROM, strength and muscular endurance for 56 minutes including the following exercises:       Prone press up x 10  Prone press up with extension sag 1x10  +Quadruped hip extension 10x  +Cat Camel  +Clam shell 15x  Bridges 2 x 15  LTR x 10 ea  Flexion in lie 10 reps  +piriformis stretch 3x20"  Ext rot stretch 10 reps 3 sec  SKTC 5"x10      [facility] 10/21/2022   Visit Number 7   VAS Pain Rating 0   Treadmill Time (in min.) 10   Speed -   Extension in Lying 10   Extension in Standing 10   Flexion in Lying -   Flexion in Sitting -   Lumbar Extension Seat Pad -   Femur Restraint -   Top Dead Center -   Counterweight -   Lumbar Flexion -   Lumbar Extension -   Test Percent Below Normative Data -   Lumbar Weight 56   Repetitions 15   Rating of Perceived Exertion 3   Ice - Z Lie (in min.) 10               Peripheral muscle strengthening which included 1 set of 15-20 repetitions at a slow, controlled 10-13 second per rep pace focused on strengthening supporting musculature for improved body mechanics and functional mobility.  Pt and therapist focused on proper form during treatment to ensure optimal strengthening of each targeted muscle group.  Machines were utilized including torso rotation, rowing, triceps,leg extension, leg curl, hip abd, hip add, and leg press.       Radha received the following manual therapy techniques: Null      Home Exercises Provided and Patient Education Provided   Stretching: LAM, SIE, Prone press up, flexion in lie  Strengthening: Bridges  Cardio program (V5): encouraged and provided handout 10/5/22. Pt states she can do 20 mins a day 3 times a week.  Lifting education (V11): V11  Posture/ Using Lumbar Roll: Encouraged 9/2/22    Education provided:   - Program progression  - DOMS    Written Home Exercises Provided: Patient instructed to cont prior HEP.  Exercises were reviewed and Radha was able to " demonstrate them prior to the end of the session.  Radha demonstrated good  understanding of the education provided.     See EMR under Patient Instructions for exercises provided 10/21/2022     Assessment   Radha presents to therapy without complaints of pain but some soreness, she was able to complete therapy without increased in low back pain. Resumed stretches and added quadruped glute and multifidus strength exercises, she was able to complete with some challenge and exercises were given for home. Increased resistance on the lumbar medx by 10% and she was able to compete 15 repetitions at 3/10 and able to complete the peripheral strengthening with some challenge. Plan to progress as tolerated.       Patient is making good progress towards established goals.  Pt will continue to benefit from skilled outpatient physical therapy to address the deficits stated in the impairment chart, provide pt/family education and to maximize pt's level of independence in the home and community environment.     Anticipated Barriers for therapy: our schedule for scheduling  Pt's spiritual, cultural and educational needs considered and pt agreeable to plan of care and goals as stated below:       GOALS: Pt is in agreement with the following goals.     Short term goals:  6 weeks or 10 visits   1.  Pt will demonstrate increased lumbar ROM by at least 3 degrees from the initial ROM value with improvements noted in functional ROM and ability to perform ADLs.  (approp and ongoing)  2.  Pt will demonstrate increased MedX average isometric strength value  by 10% from initial test resulting in improved ability to perform bending, lifting, and carrying activities safely, confidently.  (approp and ongoing)  3.  Patient report a reduction in worst pain score by 1-2 points for improved tolerance for 3/10 at worst.  (approp and ongoing)  4.  Pt able to perform HEP correctly with minimal cueing or supervision from therapist to encourage  independent management of symptoms. (approp and ongoing)        Long term goals: 10 weeks or 20 visits   1. Pt will demonstrate increased lumbar ROM by at least 6 degrees from initial ROM value, resulting in improved ability to perform functional fwd bending while standing and sitting. (approp and ongoing)  2. Pt will demonstrate increased MedX average isometric strength value  by 20% from initial test resulting in improved ability to perform bending, lifting, and carrying activities safely, confidently.  (approp and ongoing)  3. Pt to demonstrate ability to independently control and reduce their pain through posture positioning and mechanical movements throughout a typical day.  (approp and ongoing)  4.  Pt will demonstrate reduced pain and improved functional outcomes as reported on the FOTO by reaching a limitation score of < or = 20% or less in order to demonstrate subjective improvement in pt's condition.    (approp and ongoing)  5. Pt will demonstrate independence with the HEP at discharge  (approp and ongoing)  6.  Return to gym, feel stronger, get off the floor with ease  (approp and ongoing)  Plan   Continue with established Plan of Care towards established PT goals.     Remi Palencia, PT  10/21/2022

## 2022-10-25 NOTE — PROGRESS NOTES
[facility] Treatment      Name: Radha Fraga  Clinic Number: [id number]    Therapy Diagnosis:   Encounter Diagnoses   Name Primary?    Decreased ROM of trunk and back Yes    Decreased strength of trunk and back          Physician: Wilber Eduardo MD    Visit Date: 10/26/2022    Physician Orders: PT Eval and Treat    Medical Diagnosis from Referral: M54.50,G89.29 (ICD-10-CM) - Chronic midline low back pain without sciatica  Evaluation Date: 2022  Authorization Period Expiration: 24  Plan of Care Expiration: 22  Reassessment Due: 10/30/22  Visit # / Visits authorized:     Time In: 1030AM  Time Out: 1118AM  Total Billable Time: 48 minutes  Insurance type:  Fee for service Insurance Patient    Precautions: standard     Pattern of pain determined: 1 PEP  Subjective   Radha reports she is a little stiff, no pain. She finds herself waking up at 2 am and does her HEP then.    Patient reports tolerating previous visit with fatigue.  Patient reports their pain to be 0/10 on a 0-10 scale with 0 being no pain and 10 being the worst pain imaginable.  Pain Location: low back     Occupation: retired  Leisure: read      Pts goals: get stronger and stop aching     Objective     Baseline Isometric Testing on Med X equipment: Testing administered by PT  Date of testin22  ROM 0-42 deg   Max Peak Torque 133    Min Peak Torque 76    Flex/Ext Ratio 2/1   % below normative data Normative strength 0-24 degrees with 16 %  below  below normative data at 36-42 degrees and she has pain in and out of flexion, which is indicative of strength issues in this range            Limitation/Restriction for FOTO 22 Survey     Therapist reviewed FOTO scores for Radha Fraga on 2022.   FOTO documents entered into [email or web address] - see Media section.     Limitation Score: 28% limited  Goal < 20 % limited           Treatment    Pt was instructed in and performed the following:     Radha received therapeutic  "exercises to develop/improved posture, cardiovascular endurance, muscular endurance, lumbar/cervical ROM, strength and muscular endurance for 48 minutes including the following exercises:       Prone press up x 10  Prone press up with extension sag 1x10  Quadruped hip extension 10x  Cat Camel x 10   Clam shell 15x +GTB  Bridges 2 x 15 +pullover GTB   LTR x 10 ea  piriformis stretch 3x20"    [facility] 10/26/2022   Visit Number 8   VAS Pain Rating 0   Treadmill Time (in min.) 10   Speed 3   Extension in Lying 10   Extension in Standing 10   Flexion in Lying -   Flexion in Sitting -   Lumbar Extension Seat Pad -   Femur Restraint -   Top Dead Center -   Counterweight -   Lumbar Flexion -   Lumbar Extension -   Test Percent Below Normative Data -   Lumbar Weight 56   Repetitions 20   Rating of Perceived Exertion 2   Ice - Z Lie (in min.) 10         Peripheral muscle strengthening which included 1 set of 15-20 repetitions at a slow, controlled 10-13 second per rep pace focused on strengthening supporting musculature for improved body mechanics and functional mobility.  Pt and therapist focused on proper form during treatment to ensure optimal strengthening of each targeted muscle group.  Machines were utilized including torso rotation, rowing, triceps,leg extension, leg curl, hip abd, hip add, and leg press.       Radha received the following manual therapy techniques: Null    Not performed 10/26/2022 :  Ext rot stretch 10 reps 3 sec  SKTC 5"x10  Flexion in lie 10 reps      Home Exercises Provided and Patient Education Provided   Stretching: LAM, SIE, Prone press up, flexion in lie  Strengthening: Bridges  Cardio program (V5): encouraged and provided handout 10/5/22. Pt states she can do 20 mins a day 3 times a week.  Lifting education (V11): V11  Posture/ Using Lumbar Roll: Encouraged 9/2/22    Education provided:   - Program progression  - DOMS    Written Home Exercises Provided: Patient instructed to cont " prior HEP.  Exercises were reviewed and Radha was able to demonstrate them prior to the end of the session.  Radha demonstrated good  understanding of the education provided.     See EMR under Patient Instructions for exercises provided 10/21/2022     Assessment   Radha tolerated session well. Pt reports to session w/o complaints of pain. Continued previous progressions and added further core strengthening with good tolerance. Better performance with quadriped kickbacks though pt requires verbal and tactile cues for core stability. MedX was performed at 56ft lbs with 20 reps performed at an exertion rating of 2/10. Pt tolerated Matrix strengthening without issue.       Patient is making good progress towards established goals.  Pt will continue to benefit from skilled outpatient physical therapy to address the deficits stated in the impairment chart, provide pt/family education and to maximize pt's level of independence in the home and community environment.     Anticipated Barriers for therapy: our schedule for scheduling  Pt's spiritual, cultural and educational needs considered and pt agreeable to plan of care and goals as stated below:       GOALS: Pt is in agreement with the following goals.     Short term goals:  6 weeks or 10 visits   1.  Pt will demonstrate increased lumbar ROM by at least 3 degrees from the initial ROM value with improvements noted in functional ROM and ability to perform ADLs.  (approp and ongoing)  2.  Pt will demonstrate increased MedX average isometric strength value  by 10% from initial test resulting in improved ability to perform bending, lifting, and carrying activities safely, confidently.  (approp and ongoing)  3.  Patient report a reduction in worst pain score by 1-2 points for improved tolerance for 3/10 at worst.  (approp and ongoing)  4.  Pt able to perform HEP correctly with minimal cueing or supervision from therapist to encourage independent management of symptoms. (approp  and ongoing)        Long term goals: 10 weeks or 20 visits   1. Pt will demonstrate increased lumbar ROM by at least 6 degrees from initial ROM value, resulting in improved ability to perform functional fwd bending while standing and sitting. (approp and ongoing)  2. Pt will demonstrate increased MedX average isometric strength value  by 20% from initial test resulting in improved ability to perform bending, lifting, and carrying activities safely, confidently.  (approp and ongoing)  3. Pt to demonstrate ability to independently control and reduce their pain through posture positioning and mechanical movements throughout a typical day.  (approp and ongoing)  4.  Pt will demonstrate reduced pain and improved functional outcomes as reported on the FOTO by reaching a limitation score of < or = 20% or less in order to demonstrate subjective improvement in pt's condition.    (approp and ongoing)  5. Pt will demonstrate independence with the HEP at discharge  (approp and ongoing)  6.  Return to gym, feel stronger, get off the floor with ease  (approp and ongoing)  Plan   Continue with established Plan of Care towards established PT goals.     Aden Peterson, PTA  10/26/2022

## 2022-10-26 ENCOUNTER — CLINICAL SUPPORT (OUTPATIENT)
Dept: REHABILITATION | Facility: HOSPITAL | Age: 67
End: 2022-10-26
Attending: FAMILY MEDICINE
Payer: MEDICARE

## 2022-10-26 DIAGNOSIS — M25.69 DECREASED ROM OF TRUNK AND BACK: Primary | ICD-10-CM

## 2022-10-26 DIAGNOSIS — R29.898 DECREASED STRENGTH OF TRUNK AND BACK: ICD-10-CM

## 2022-10-26 PROCEDURE — 97110 THERAPEUTIC EXERCISES: CPT | Mod: PN,CQ

## 2022-10-28 ENCOUNTER — CLINICAL SUPPORT (OUTPATIENT)
Dept: REHABILITATION | Facility: HOSPITAL | Age: 67
End: 2022-10-28
Attending: FAMILY MEDICINE
Payer: MEDICARE

## 2022-10-28 DIAGNOSIS — M25.69 DECREASED ROM OF TRUNK AND BACK: Primary | ICD-10-CM

## 2022-10-28 DIAGNOSIS — R29.898 DECREASED STRENGTH OF TRUNK AND BACK: ICD-10-CM

## 2022-10-28 PROCEDURE — 97110 THERAPEUTIC EXERCISES: CPT | Mod: PN

## 2022-10-28 NOTE — PROGRESS NOTES
[facility]      Name: Radha Fraga  Clinic Number: [id number]    Therapy Diagnosis:   Encounter Diagnoses   Name Primary?    Decreased ROM of trunk and back Yes    Decreased strength of trunk and back          Physician: Wilber Eduardo MD    Visit Date: 10/28/2022    Physician Orders: PT Eval and Treat    Medical Diagnosis from Referral: M54.50,G89.29 (ICD-10-CM) - Chronic midline low back pain without sciatica  Evaluation Date: 2022  Authorization Period Expiration: 2022 (sent 10/28/2022)  Plan of Care Expiration: 22  Reassessment Due: 22  Visit # / Visits authorized:     Time In: 10:05 AM  Time Out: 11:15 AM  Total Billable Time: 65 minutes  Insurance type:  Fee for service Insurance Patient    Precautions: standard     Pattern of pain determined: 1 PEP  Subjective   Radha reports she is having some soreness but feeling better overall    Patient reports tolerating previous visit with fatigue.  Patient reports their pain to be 0/10 on a 0-10 scale with 0 being no pain and 10 being the worst pain imaginable.  Pain Location: low back     Occupation: retired  Leisure: read      Pts goals: get stronger and stop aching     Objective     Baseline Isometric Testing on Med X equipment: Testing administered by PT  Date of testin22  ROM 0-42 deg   Max Peak Torque 133    Min Peak Torque 76    Flex/Ext Ratio 2/1   % below normative data Normative strength 0-24 degrees with 16 %  below  below normative data at 36-42 degrees and she has pain in and out of flexion, which is indicative of strength issues in this range      Midpoint Isometric Testing on Med X equipment: Testing administered by PT  Date of testing: 10/28/2022  ROM 0-54 deg   Max Peak Torque 172   Min Peak Torque 117   Flex/Ext Ratio 1.4:1   % above normative data 52%   % increase in strength 32%         Limitation/Restriction for FOTO Lumbar Survey     Therapist reviewed FOTO scores for Radha CARRENO  "[place] on 9/1/2022.   FOTO documents entered into [employer] - see Media section.     Limitation Score: 28% limited  Goal < 20 % limited           Treatment    Pt was instructed in and performed the following:     Radha received therapeutic exercises to develop/improved posture, cardiovascular endurance, muscular endurance, lumbar/cervical ROM, strength and muscular endurance for 48 minutes including the following exercises:       Prone press up x 10  Prone press up with extension sag x10  +Bird dog 10x  Cat Camel x 10   Clam shell 15x +GTB  Bridges 2 x 15 +pullover GTB   +Bridge with kick 5x  LTR x 10 ea  piriformis stretch 3x20"  +Pallof press 10# free motion x10    [facility] 10/28/2022   Visit Number 9   VAS Pain Rating 0   Treadmill Time (in min.) 10   Speed -   Extension in Lying 10   Extension in Standing 10   Flexion in Lying -   Flexion in Sitting -   Lumbar Extension Seat Pad -   Femur Restraint -   Top Dead Center -   Counterweight -   Lumbar Flexion 54   Lumbar Extension 0   Lumbar Peak Torque 172   Min Torque 117   Test Percent Below Normative Data -   Test Percent Gain in Strength from Initial  32   Lumbar Weight -   Repetitions -   Rating of Perceived Exertion -   Ice - Z Lie (in min.) 10           Peripheral muscle strengthening which included 1 set of 15-20 repetitions at a slow, controlled 10-13 second per rep pace focused on strengthening supporting musculature for improved body mechanics and functional mobility.  Pt and therapist focused on proper form during treatment to ensure optimal strengthening of each targeted muscle group.  Machines were utilized including torso rotation, rowing, triceps,leg extension, leg curl, hip abd, hip add, and leg press.       Radha received the following manual therapy techniques: Null    Not performed 10/28/2022 :  Ext rot stretch 10 reps 3 sec  SKTC 5"x10  Flexion in lie 10 reps      Home Exercises Provided and Patient Education Provided   Stretching: LAM " SIE, Prone press up, flexion in lie  Strengthening: Bridges  Cardio program (V5): encouraged and provided handout 10/5/22. Pt states she can do 20 mins a day 3 times a week.  Lifting education (V11): V11  Posture/ Using Lumbar Roll: Encouraged 9/2/22    Education provided:   - Program progression  - DOMS    Written Home Exercises Provided: Patient instructed to cont prior HEP.  Exercises were reviewed and Radha was able to demonstrate them prior to the end of the session.  Radha demonstrated good  understanding of the education provided.     See EMR under Patient Instructions for exercises provided 10/28/2022     Assessment     Patient has attended 10 visits at [facility] which included MD evaluation, PT evaluation with isometric testing, and physical therapy treatment including HEP instruction, education, aerobic work, dynamic strengthening on med ex equipment for the spine, and whole body strengthening on med ex equipment with increasing weight loads.  Patient  is demonstrating increased ability to reduce symptoms, improved posture, increased  ROM, and increased strength on med ex test by 32%  average.        Patient is making good progress towards established goals.  Pt will continue to benefit from skilled outpatient physical therapy to address the deficits stated in the impairment chart, provide pt/family education and to maximize pt's level of independence in the home and community environment.     Anticipated Barriers for therapy: our schedule for scheduling  Pt's spiritual, cultural and educational needs considered and pt agreeable to plan of care and goals as stated below:       GOALS: Pt is in agreement with the following goals.     Short term goals:  6 weeks or 10 visits   1.  Pt will demonstrate increased lumbar ROM by at least 3 degrees from the initial ROM value with improvements noted in functional ROM and ability to perform ADLs.  (MET 10/28/2022)  2.  Pt will demonstrate increased MedX  average isometric strength value  by 10% from initial test resulting in improved ability to perform bending, lifting, and carrying activities safely, confidently.  (MET 10/28/2022)  3.  Patient report a reduction in worst pain score by 1-2 points for improved tolerance for 3/10 at worst. (MET 10/28/2022)  4.  Pt able to perform HEP correctly with minimal cueing or supervision from therapist to encourage independent management of symptoms. (MET 10/28/2022)        Long term goals: 10 weeks or 20 visits   1. Pt will demonstrate increased lumbar ROM by at least 6 degrees from initial ROM value, resulting in improved ability to perform functional fwd bending while standing and sitting. (MET 10/28/2022)  2. Pt will demonstrate increased MedX average isometric strength value  by 20% from initial test resulting in improved ability to perform bending, lifting, and carrying activities safely, confidently. (MET 10/28/2022)  2 UPDATED. Pt will demonstrate increased MedX average isometric strength value  by 40% from initial test resulting in improved ability to perform bending, lifting, and carrying activities safely, confidently. (approp and ongoing)  3. Pt to demonstrate ability to independently control and reduce their pain through posture positioning and mechanical movements throughout a typical day.  (approp and ongoing)  4.  Pt will demonstrate reduced pain and improved functional outcomes as reported on the FOTO by reaching a limitation score of < or = 20% or less in order to demonstrate subjective improvement in pt's condition.    (partially met)  5. Pt will demonstrate independence with the HEP at discharge  (approp and ongoing)  6.  Return to gym, feel stronger, get off the floor with ease  (partially met)  Plan   Continue with established Plan of Care towards established PT goals.     Remi Palencia, PT  10/28/2022

## 2022-10-28 NOTE — PLAN OF CARE
[facility]      Name: Radha Fraga  Clinic Number: [id number]    Therapy Diagnosis:   Encounter Diagnoses   Name Primary?    Decreased ROM of trunk and back Yes    Decreased strength of trunk and back          Physician: Wilber Eduardo MD    Visit Date: 10/28/2022    Physician Orders: PT Eval and Treat    Medical Diagnosis from Referral: M54.50,G89.29 (ICD-10-CM) - Chronic midline low back pain without sciatica  Evaluation Date: 2022  Authorization Period Expiration: 2022 (sent 10/28/2022)  Plan of Care Expiration: 22  Reassessment Due: 22  Visit # / Visits authorized:     Time In: 10:05 AM  Time Out: 11:15 AM  Total Billable Time: 65 minutes  Insurance type:  Fee for service Insurance Patient    Precautions: standard     Pattern of pain determined: 1 PEP  Subjective   Radha reports she is having some soreness but feeling better overall    Patient reports tolerating previous visit with fatigue.  Patient reports their pain to be 0/10 on a 0-10 scale with 0 being no pain and 10 being the worst pain imaginable.  Pain Location: low back     Occupation: retired  Leisure: read      Pts goals: get stronger and stop aching     Objective     Baseline Isometric Testing on Med X equipment: Testing administered by PT  Date of testin22  ROM 0-42 deg   Max Peak Torque 133    Min Peak Torque 76    Flex/Ext Ratio 2/1   % below normative data Normative strength 0-24 degrees with 16 %  below  below normative data at 36-42 degrees and she has pain in and out of flexion, which is indicative of strength issues in this range      Midpoint Isometric Testing on Med X equipment: Testing administered by PT  Date of testing: 10/28/2022  ROM 0-54 deg   Max Peak Torque 172   Min Peak Torque 117   Flex/Ext Ratio 1.4:1   % above normative data 52%   % increase in strength 32%         Limitation/Restriction for FOTO Lumbar Survey     Therapist reviewed FOTO scores for Radha CARRENO  [place] on 9/1/2022.   FOTO documents entered into [employer] - see Media section.     Visit 7: 25%    Limitation Score: 28% limited  Goal < 20 % limited         Assessment     Patient has attended 10 visits at [facility] which included MD evaluation, PT evaluation with isometric testing, and physical therapy treatment including HEP instruction, education, aerobic work, dynamic strengthening on med ex equipment for the spine, and whole body strengthening on med ex equipment with increasing weight loads.  Patient  is demonstrating increased ability to reduce symptoms, improved posture, increased  ROM, and increased strength on med ex test by 32%  average.        Patient is making good progress towards established goals.  Pt will continue to benefit from skilled outpatient physical therapy to address the deficits stated in the impairment chart, provide pt/family education and to maximize pt's level of independence in the home and community environment.     Anticipated Barriers for therapy: our schedule for scheduling  Pt's spiritual, cultural and educational needs considered and pt agreeable to plan of care and goals as stated below:       GOALS: Pt is in agreement with the following goals.     Short term goals:  6 weeks or 10 visits   1.  Pt will demonstrate increased lumbar ROM by at least 3 degrees from the initial ROM value with improvements noted in functional ROM and ability to perform ADLs.  (MET 10/28/2022)  2.  Pt will demonstrate increased MedX average isometric strength value  by 10% from initial test resulting in improved ability to perform bending, lifting, and carrying activities safely, confidently.  (MET 10/28/2022)  3.  Patient report a reduction in worst pain score by 1-2 points for improved tolerance for 3/10 at worst. (MET 10/28/2022)  4.  Pt able to perform HEP correctly with minimal cueing or supervision from therapist to encourage independent management of symptoms. (MET 10/28/2022)         Long term goals: 10 weeks or 20 visits   1. Pt will demonstrate increased lumbar ROM by at least 6 degrees from initial ROM value, resulting in improved ability to perform functional fwd bending while standing and sitting. (MET 10/28/2022)  2. Pt will demonstrate increased MedX average isometric strength value  by 20% from initial test resulting in improved ability to perform bending, lifting, and carrying activities safely, confidently. (MET 10/28/2022)  2 UPDATED. Pt will demonstrate increased MedX average isometric strength value  by 40% from initial test resulting in improved ability to perform bending, lifting, and carrying activities safely, confidently. (approp and ongoing)  3. Pt to demonstrate ability to independently control and reduce their pain through posture positioning and mechanical movements throughout a typical day.  (approp and ongoing)  4.  Pt will demonstrate reduced pain and improved functional outcomes as reported on the FOTO by reaching a limitation score of < or = 20% or less in order to demonstrate subjective improvement in pt's condition.    (partially met)  5. Pt will demonstrate independence with the HEP at discharge  (approp and ongoing)  6.  Return to gym, feel stronger, get off the floor with ease  (partially met)  Plan   Cont per POC for 8 weeks    Remi Palencia, PT  10/28/2022

## 2022-11-07 ENCOUNTER — TELEPHONE (OUTPATIENT)
Dept: FAMILY MEDICINE | Facility: CLINIC | Age: 67
End: 2022-11-07
Payer: MEDICARE

## 2022-11-07 DIAGNOSIS — U07.1 COVID-19 VIRUS INFECTION: Primary | ICD-10-CM

## 2022-11-07 NOTE — TELEPHONE ENCOUNTER
Patient reports that she test positive for covid via home test this morning at 8am. States that she has been having a runny nose, scratchy throat, chills and coughing. Stated that she has been only taking nightquil.

## 2022-11-07 NOTE — TELEPHONE ENCOUNTER
----- Message from Jon Bryan sent at 11/7/2022  9:08 AM CST -----  Type: Patient Call Back    Who called: Self     What is the request in detail: PT stated she just took a rapid test and was tested + for Covid and want to know what is her steps from there     Can the clinic reply by [facility]? No     Would the patient rather a call back or a response via [facility]? Call     Best call back number: [phone]

## 2022-11-14 ENCOUNTER — CLINICAL SUPPORT (OUTPATIENT)
Dept: REHABILITATION | Facility: HOSPITAL | Age: 67
End: 2022-11-14
Payer: MEDICARE

## 2022-11-14 DIAGNOSIS — R29.898 DECREASED STRENGTH OF TRUNK AND BACK: ICD-10-CM

## 2022-11-14 DIAGNOSIS — M25.69 DECREASED ROM OF TRUNK AND BACK: Primary | ICD-10-CM

## 2022-11-14 PROCEDURE — 97110 THERAPEUTIC EXERCISES: CPT | Mod: PN | Performed by: PHYSICAL MEDICINE & REHABILITATION

## 2022-11-14 NOTE — PROGRESS NOTES
[facility]      Name: Radha Fraga  Clinic Number: [id number]    Therapy Diagnosis:   Encounter Diagnoses   Name Primary?    Decreased ROM of trunk and back Yes    Decreased strength of trunk and back        Physician: Wilber Eduardo MD    Visit Date: 2022    Physician Orders: PT Eval and Treat    Medical Diagnosis from Referral: M54.50,G89.29 (ICD-10-CM) - Chronic midline low back pain without sciatica  Evaluation Date: 2022  Authorization Period Expiration: 2022 (sent 10/28/2022)  Plan of Care Expiration: 22  Reassessment Due: 22  Visit # / Visits authorized: 10/ 20    Time In: 10:00 AM  Time Out: 11:15 AM  Total Billable Time: 65 minutes  Insurance type:  Fee for service Insurance Patient    Precautions: standard     Pattern of pain determined: 1 PEP  Subjective   Radha reports she is feeling good.  She had no pain today.  She wakes up without symptoms.  She is doing her stretches one time/day, in the morning.  She is using a lumbar roll.  She does want to do wellness when she is done therapy to learn how to set up machines and maybe join a gym.    Patient reports tolerating previous visit with fatigue.  Patient reports their pain to be 0/10 on a 0-10 scale with 0 being no pain and 10 being the worst pain imaginable.  Pain Location: low back     Occupation: retired  Leisure: read      Pts goals: get stronger and stop aching     Objective     Baseline Isometric Testing on Med X equipment: Testing administered by PT  Date of testin22  ROM 0-42 deg   Max Peak Torque 133    Min Peak Torque 76    Flex/Ext Ratio 2/1   % below normative data Normative strength 0-24 degrees with 16 %  below  below normative data at 36-42 degrees and she has pain in and out of flexion, which is indicative of strength issues in this range      Midpoint Isometric Testing on Med X equipment: Testing administered by PT  Date of testing: 10/28/2022  ROM 0-54 deg   Max Peak  "Torque 172   Min Peak Torque 117   Flex/Ext Ratio 1.4:1   % above normative data 52%   % increase in strength 32%         Limitation/Restriction for FOTO Lumbar Survey     Therapist reviewed FOTO scores for Radha Fraga on 9/1/2022.   FOTO documents entered into [employer] - see Media section.     Limitation Score: 28% limited initial  FOTO 6% limited 11/14/22  Goal < 20 % limited           Treatment    Pt was instructed in and performed the following:     Radha received therapeutic exercises to develop/improved posture, cardiovascular endurance, muscular endurance, lumbar ROM, strength and muscular endurance for 65 minutes including the following exercises:       Prone press up x 10  Prone press up with extension sag x10  +Bird dog 10x with green theraband UE  Cat Camel x 10   Clam shell 15x +GTB followed by hip int rot 10 reps each side  Bridges 2 x 15 +pullover GTB   +Bridge with kick 10, holding ball in hands towards ceiling  LTR x 10 ea  piriformis stretch 3x20"  +Pallof press 10# free motion x10    Lifting information, handout given.  Taught various lifts and when they would be used.  Patient able to demonstrate all lifts, including floor to waist, hip hinge, golfer's lift, and over head lift.  -practiced with 15 lbs floor to waist  -practiced hip hinge at sink for dishes  -practiced golfer's lift at counter for emptying        Next visit: consider dead lifts and single leg dead lifts    [facility] 11/14/2022   Visit Number 10   VAS Pain Rating 0   Treadmill Time (in min.) 10   Speed -   Extension in Lying 10   Extension in Standing 10   Flexion in Lying -   Flexion in Sitting -   Lumbar Extension Seat Pad -   Femur Restraint -   Top Dead Center -   Counterweight -   Lumbar Flexion -   Lumbar Extension -   Lumbar Peak Torque -   Min Torque -   Test Percent Below Normative Data -   Test Percent Gain in Strength from Initial  -   Lumbar Weight 60   Repetitions 20   Rating of Perceived Exertion 2 "   Ice - Z Lie (in min.) 10        Peripheral muscle strengthening which included 1 set of 15-20 repetitions at a slow, controlled 10-13 second per rep pace focused on strengthening supporting musculature for improved body mechanics and functional mobility.  Pt and therapist focused on proper form during treatment to ensure optimal strengthening of each targeted muscle group.  Machines were utilized including torso rotation, rowing, triceps,leg extension, leg curl, hip abd, hip add, and leg press.       Radha received the following manual therapy techniques: Null      Home Exercises Provided and Patient Education Provided   Stretching: LAM, SIE, Prone press up, flexion in lie  Strengthening: Bridges  Cardio program (V5): encouraged and provided handout 10/5/22. Pt states she can do 20 mins a day 3 times a week.  Lifting education (V11): V11  Posture/ Using Lumbar Roll: using    Education provided:   - Program progression  - wellness program at discharge  -lifting information    Written Home Exercises Provided: Patient instructed to cont prior HEP.  Exercises were reviewed and Radha was able to demonstrate them prior to the end of the session.  Radha demonstrated good  understanding of the education provided.     See EMR under Patient Instructions for exercises provided 10/28/2022     Assessment     Patient has attended 10 visits at [facility] which included MD evaluation, PT evaluation with isometric testing, and physical therapy treatment including HEP instruction, education, aerobic work, dynamic strengthening on med ex equipment for the spine, and whole body strengthening on med ex equipment with increasing weight loads.  Patient  is demonstrating increased ability to reduce symptoms, improved posture, increased  ROM, and increased strength on med ex test by 32%  average.  FOTO score only 6% impaired.   Lifting information began and tolerated well, encourage progression of exercise to standing core work,  dead lifts and single leg dead lifts        Patient is making good progress towards established goals.  Pt will continue to benefit from skilled outpatient physical therapy to address the deficits stated in the impairment chart, provide pt/family education and to maximize pt's level of independence in the home and community environment.     Anticipated Barriers for therapy: our schedule for scheduling  Pt's spiritual, cultural and educational needs considered and pt agreeable to plan of care and goals as stated below:       GOALS: Pt is in agreement with the following goals.     Short term goals:  6 weeks or 10 visits   1.  Pt will demonstrate increased lumbar ROM by at least 3 degrees from the initial ROM value with improvements noted in functional ROM and ability to perform ADLs.  (MET 10/28/2022)  2.  Pt will demonstrate increased MedX average isometric strength value  by 10% from initial test resulting in improved ability to perform bending, lifting, and carrying activities safely, confidently.  (MET 10/28/2022)  3.  Patient report a reduction in worst pain score by 1-2 points for improved tolerance for 3/10 at worst. (MET 10/28/2022)  4.  Pt able to perform HEP correctly with minimal cueing or supervision from therapist to encourage independent management of symptoms. (MET 10/28/2022)        Long term goals: 10 weeks or 20 visits   1. Pt will demonstrate increased lumbar ROM by at least 6 degrees from initial ROM value, resulting in improved ability to perform functional fwd bending while standing and sitting. (MET 10/28/2022)  2. Pt will demonstrate increased MedX average isometric strength value  by 20% from initial test resulting in improved ability to perform bending, lifting, and carrying activities safely, confidently. (MET 10/28/2022)  2 UPDATED. Pt will demonstrate increased MedX average isometric strength value  by 40% from initial test resulting in improved ability to perform bending, lifting, and  carrying activities safely, confidently. (approp and ongoing)  3. Pt to demonstrate ability to independently control and reduce their pain through posture positioning and mechanical movements throughout a typical day.  (approp and ongoing)  4.  Pt will demonstrate reduced pain and improved functional outcomes as reported on the FOTO by reaching a limitation score of < or = 20% or less in order to demonstrate subjective improvement in pt's condition.    (MET 11/14/22)  5. Pt will demonstrate independence with the HEP at discharge  (approp and ongoing)  6.  Return to gym, feel stronger, get off the floor with ease  (partially met)  Plan   Continue with established Plan of Care towards established PT goals.     Sherron Cesar, PT  11/14/2022

## 2022-11-19 ENCOUNTER — CLINICAL SUPPORT (OUTPATIENT)
Dept: REHABILITATION | Facility: HOSPITAL | Age: 67
End: 2022-11-19
Payer: MEDICARE

## 2022-11-19 DIAGNOSIS — M25.69 DECREASED ROM OF TRUNK AND BACK: Primary | ICD-10-CM

## 2022-11-19 DIAGNOSIS — R29.898 DECREASED STRENGTH OF TRUNK AND BACK: ICD-10-CM

## 2022-11-19 PROCEDURE — 97110 THERAPEUTIC EXERCISES: CPT | Mod: PN,CQ

## 2022-11-19 NOTE — PROGRESS NOTES
[facility]      Name: Radha Fraga  Clinic Number: [id number]    Therapy Diagnosis:   Encounter Diagnoses   Name Primary?    Decreased ROM of trunk and back Yes    Decreased strength of trunk and back        Physician: Wilber Eduardo MD    Visit Date: 2022    Physician Orders: PT Eval and Treat    Medical Diagnosis from Referral: M54.50,G89.29 (ICD-10-CM) - Chronic midline low back pain without sciatica  Evaluation Date: 2022  Authorization Period Expiration: 2022 (sent 10/28/2022)  Plan of Care Expiration: 22  Reassessment Due: 22  Visit # / Visits authorized:     Time In: 10:15AM  Time Out: 11:15 AM  Total Billable Time: 60 minutes  Insurance type:  Fee for service Insurance Patient    Precautions: standard     Pattern of pain determined: 1 PEP  Subjective   Radha reports she woke up a little achy today. Since she's been coming she hasn't been experiencing this much. She's been working on exercises at home.     Patient reports tolerating previous visit with fatigue.  Patient reports their pain to be 0/10 on a 0-10 scale with 0 being no pain and 10 being the worst pain imaginable.  Pain Location: low back     Occupation: retired  Leisure: read      Pts goals: get stronger and stop aching     Objective     Baseline Isometric Testing on Med X equipment: Testing administered by PT  Date of testin22  ROM 0-42 deg   Max Peak Torque 133    Min Peak Torque 76    Flex/Ext Ratio 2/1   % below normative data Normative strength 0-24 degrees with 16 %  below  below normative data at 36-42 degrees and she has pain in and out of flexion, which is indicative of strength issues in this range      Midpoint Isometric Testing on Med X equipment: Testing administered by PT  Date of testing: 10/28/2022  ROM 0-54 deg   Max Peak Torque 172   Min Peak Torque 117   Flex/Ext Ratio 1.4:1   % above normative data 52%   % increase in strength 32%        "  Limitation/Restriction for FOTO Lumbar Survey     Therapist reviewed FOTO scores for Radha Fraga on 9/1/2022.   FOTO documents entered into [employer] - see Media section.     Limitation Score: 28% limited initial  FOTO 6% limited 11/14/22  Goal < 20 % limited           Treatment    Pt was instructed in and performed the following:     Radha received therapeutic exercises to develop/improved posture, cardiovascular endurance, muscular endurance, lumbar ROM, strength and muscular endurance for 65 minutes including the following exercises:   PPT 20x3"  LTR x 10 ea  Cat Camel x 10   +Bridge with kick 10, holding ball in hands towards ceiling  +Deadlifts 10lb KB 2x5  +SL RDL 5# 2x5 ea.  +STS 18 in plyo 2x5  Pallof press 10# freemotion x10  Bird dog 10x with green theraband UE    [facility] 11/19/2022   Visit Number 11   VAS Pain Rating 0   Treadmill Time (in min.) 10   Speed -   Extension in Lying -   Extension in Standing -   Flexion in Lying -   Flexion in Sitting -   Lumbar Extension - Seat Pad -   Femur Restraint -   Top Dead Center -   Counterweight -   Lumbar Flexion -   Lumbar Extension -   Lumbar Peak Torque -   Lumbar Weight 64   Repetitions 15   Rating of Perceived Exertion 2         Peripheral muscle strengthening which included 1 set of 15-20 repetitions at a slow, controlled 10-13 second per rep pace focused on strengthening supporting musculature for improved body mechanics and functional mobility.  Pt and therapist focused on proper form during treatment to ensure optimal strengthening of each targeted muscle group.  Machines were utilized including torso rotation, rowing, triceps,leg extension, leg curl, hip abd, hip add, and leg press.       Radha received the following manual therapy techniques: Null      Home Exercises Provided and Patient Education Provided   Stretching: LAM, SIE, Prone press up, flexion in lie  Strengthening: Bridges  Cardio program (V5): encouraged and provided " handout 10/5/22. Pt states she can do 20 mins a day 3 times a week.  Lifting education (V11): V11  Posture/ Using Lumbar Roll: using    Education provided:   - Program progression  - wellness program at discharge  -lifting information    Written Home Exercises Provided: Patient instructed to cont prior HEP.  Exercises were reviewed and Radha was able to demonstrate them prior to the end of the session.  Radha demonstrated good  understanding of the education provided.     See EMR under Patient Instructions for exercises provided 10/28/2022     Assessment     Mrs. Garcia tolerated the above tx session well without exacerbation of symptoms. Pt arrived feeling a little achy. Emphasis of core engagement throughout session especially with the addition of deadlifts and sts. Reiterated lifting mechanics from previous session. Due to software issue MedX lumbar ext was performed on core machine and pt reported no difficulty. Peripheral machines progressed with appropriate level of muscle fatigue achieved post tx.       Patient is making good progress towards established goals.  Pt will continue to benefit from skilled outpatient physical therapy to address the deficits stated in the impairment chart, provide pt/family education and to maximize pt's level of independence in the home and community environment.     Anticipated Barriers for therapy: our schedule for scheduling  Pt's spiritual, cultural and educational needs considered and pt agreeable to plan of care and goals as stated below:       GOALS: Pt is in agreement with the following goals.     Short term goals:  6 weeks or 10 visits   1.  Pt will demonstrate increased lumbar ROM by at least 3 degrees from the initial ROM value with improvements noted in functional ROM and ability to perform ADLs.  (MET 10/28/2022)  2.  Pt will demonstrate increased MedX average isometric strength value  by 10% from initial test resulting in improved ability to perform bending, lifting,  "and carrying activities safely, confidently.  (MET 10/28/2022)  3.  Patient report a reduction in worst pain score by 1-2 points for improved tolerance for 3/10 at worst. (MET 10/28/2022)  4.  Pt able to perform HEP correctly with minimal cueing or supervision from therapist to encourage independent management of symptoms. (MET 10/28/2022)        Long term goals: 10 weeks or 20 visits   1. Pt will demonstrate increased lumbar ROM by at least 6 degrees from initial ROM value, resulting in improved ability to perform functional fwd bending while standing and sitting. (MET 10/28/2022)  2. Pt will demonstrate increased MedX average isometric strength value  by 20% from initial test resulting in improved ability to perform bending, lifting, and carrying activities safely, confidently. (MET 10/28/2022)  2 UPDATED. Pt will demonstrate increased MedX average isometric strength value  by 40% from initial test resulting in improved ability to perform bending, lifting, and carrying activities safely, confidently. (approp and ongoing)  3. Pt to demonstrate ability to independently control and reduce their pain through posture positioning and mechanical movements throughout a typical day.  (approp and ongoing)  4.  Pt will demonstrate reduced pain and improved functional outcomes as reported on the FOTO by reaching a limitation score of < or = 20% or less in order to demonstrate subjective improvement in pt's condition.    (MET 11/14/22)  5. Pt will demonstrate independence with the HEP at discharge  (approp and ongoing)  6.  Return to gym, feel stronger, get off the floor with ease  (partially met)    Plan   Resume NV:    Prone press up x 10  Prone press up with extension sag x10  Clam shell 15x +GTB followed by hip int rot 10 reps each side  Bridges 2 x 15 +pullover GTB   Piriformis stretch 3x20"    Jania Mcleod, PTA  11/19/2022                            "

## 2022-12-02 ENCOUNTER — CLINICAL SUPPORT (OUTPATIENT)
Dept: REHABILITATION | Facility: HOSPITAL | Age: 67
End: 2022-12-02
Payer: MEDICARE

## 2022-12-02 DIAGNOSIS — R29.898 DECREASED STRENGTH OF TRUNK AND BACK: ICD-10-CM

## 2022-12-02 DIAGNOSIS — M25.69 DECREASED ROM OF TRUNK AND BACK: Primary | ICD-10-CM

## 2022-12-02 PROCEDURE — 97110 THERAPEUTIC EXERCISES: CPT | Mod: PN,CQ

## 2022-12-02 NOTE — PROGRESS NOTES
[facility]      Name: Radha Fraga  Clinic Number: [id number]    Therapy Diagnosis:   Encounter Diagnoses   Name Primary?    Decreased ROM of trunk and back Yes    Decreased strength of trunk and back          Physician: Wilber Eduardo MD    Visit Date: 2022    Physician Orders: PT Eval and Treat    Medical Diagnosis from Referral: M54.50,G89.29 (ICD-10-CM) - Chronic midline low back pain without sciatica  Evaluation Date: 2022  Authorization Period Expiration: 2022 (sent 10/28/2022)  Plan of Care Expiration: 22  Reassessment Due: 22  Visit # / Visits authorized:     Time In: 1100am  Time Out: 1150am  Total Billable Time: 50 minutes  Insurance type:  Fee for service Insurance Patient    Precautions: standard     Pattern of pain determined: 1 PEP  Subjective   Radha reports she is feeling really good. No pain to report. She might be interested in D/C soon and wellness following.     Patient reports tolerating previous visit with fatigue.  Patient reports their pain to be 0/10 on a 0-10 scale with 0 being no pain and 10 being the worst pain imaginable.  Pain Location: low back     Occupation: retired  Leisure: read      Pts goals: get stronger and stop aching     Objective     Baseline Isometric Testing on Med X equipment: Testing administered by PT  Date of testin22  ROM 0-42 deg   Max Peak Torque 133    Min Peak Torque 76    Flex/Ext Ratio 2/1   % below normative data Normative strength 0-24 degrees with 16 %  below  below normative data at 36-42 degrees and she has pain in and out of flexion, which is indicative of strength issues in this range      Midpoint Isometric Testing on Med X equipment: Testing administered by PT  Date of testing: 10/28/2022  ROM 0-54 deg   Max Peak Torque 172   Min Peak Torque 117   Flex/Ext Ratio 1.4:1   % above normative data 52%   % increase in strength 32%         Limitation/Restriction for FOTO Lumbar  "Survey     Therapist reviewed FOTO scores for Radha Fraga on 9/1/2022.   FOTO documents entered into [employer] - see Media section.     Limitation Score: 28% limited initial  FOTO 6% limited 11/14/22  Goal < 20 % limited           Treatment    Pt was instructed in and performed the following:     Radha received therapeutic exercises to develop/improved posture, cardiovascular endurance, muscular endurance, lumbar ROM, strength and muscular endurance for 50 minutes including the following exercises:     PPT 20x3"  LTR x 10 ea  Cat Camel x 10   Deadlifts 10lb KB 2 x 10  SL RDL 5# x 10 ea.  STS 18 in plyo 10 x 10#  Pallof press 10# freemotion x10    [facility] 12/2/2022   Visit Number 12   VAS Pain Rating 0   Treadmill Time (in min.) 10   Speed -   Extension in Lying -   Extension in Standing -   Flexion in Lying -   Flexion in Sitting -   Lumbar Extension Seat Pad -   Femur Restraint -   Top Dead Center -   Counterweight -   Lumbar Flexion -   Lumbar Extension -   Lumbar Peak Torque -   Min Torque -   Test Percent Below Normative Data -   Test Percent Gain in Strength from Initial  -   Lumbar Weight 64   Repetitions 18   Rating of Perceived Exertion 3   Ice - Z Lie (in min.) -         Peripheral muscle strengthening which included 1 set of 15-20 repetitions at a slow, controlled 10-13 second per rep pace focused on strengthening supporting musculature for improved body mechanics and functional mobility.  Pt and therapist focused on proper form during treatment to ensure optimal strengthening of each targeted muscle group.  Machines were utilized including torso rotation, rowing, triceps,leg extension, leg curl, hip abd, hip add, and leg press.       Radha received the following manual therapy techniques: Null      Home Exercises Provided and Patient Education Provided   Stretching: LAM, SIE, Prone press up, flexion in lie  Strengthening: Bridges  Cardio program (V5): encouraged and provided handout 10/5/22. Pt " states she can do 20 mins a day 3 times a week.  Lifting education (V11): V11  Posture/ Using Lumbar Roll: using    Education provided:   - Program progression  - wellness program at discharge  -lifting information    Written Home Exercises Provided: Patient instructed to cont prior HEP.  Exercises were reviewed and Radha was able to demonstrate them prior to the end of the session.  Radha demonstrated good  understanding of the education provided.     See EMR under Patient Instructions for exercises provided 10/28/2022     Assessment     Radha tolerated session well. No issues to report. Pt feels like she is ready to be discharged next visit and continue with wellness. Pt demonstrated good lifting mechanics with single and double leg dead lift. Pt also tolerated sit to stand with 10 pound kettlebell without issue. MedX was performed at 64ft lbs with 18 reps performed at an exertion rating of 3/10.        Patient is making good progress towards established goals.  Pt will continue to benefit from skilled outpatient physical therapy to address the deficits stated in the impairment chart, provide pt/family education and to maximize pt's level of independence in the home and community environment.     Anticipated Barriers for therapy: our schedule for scheduling  Pt's spiritual, cultural and educational needs considered and pt agreeable to plan of care and goals as stated below:       GOALS: Pt is in agreement with the following goals.     Short term goals:  6 weeks or 10 visits   1.  Pt will demonstrate increased lumbar ROM by at least 3 degrees from the initial ROM value with improvements noted in functional ROM and ability to perform ADLs.  (MET 10/28/2022)  2.  Pt will demonstrate increased MedX average isometric strength value  by 10% from initial test resulting in improved ability to perform bending, lifting, and carrying activities safely, confidently.  (MET 10/28/2022)  3.  Patient report a reduction in worst  pain score by 1-2 points for improved tolerance for 3/10 at worst. (MET 10/28/2022)  4.  Pt able to perform HEP correctly with minimal cueing or supervision from therapist to encourage independent management of symptoms. (MET 10/28/2022)        Long term goals: 10 weeks or 20 visits   1. Pt will demonstrate increased lumbar ROM by at least 6 degrees from initial ROM value, resulting in improved ability to perform functional fwd bending while standing and sitting. (MET 10/28/2022)  2. Pt will demonstrate increased MedX average isometric strength value  by 20% from initial test resulting in improved ability to perform bending, lifting, and carrying activities safely, confidently. (MET 10/28/2022)  2 UPDATED. Pt will demonstrate increased MedX average isometric strength value  by 40% from initial test resulting in improved ability to perform bending, lifting, and carrying activities safely, confidently. (approp and ongoing)  3. Pt to demonstrate ability to independently control and reduce their pain through posture positioning and mechanical movements throughout a typical day.  (approp and ongoing)  4.  Pt will demonstrate reduced pain and improved functional outcomes as reported on the FOTO by reaching a limitation score of < or = 20% or less in order to demonstrate subjective improvement in pt's condition.    (MET 11/14/22)  5. Pt will demonstrate independence with the HEP at discharge  (approp and ongoing)  6.  Return to gym, feel stronger, get off the floor with ease  (partially met)    Plan   D/C next visit    Aden Peterson, PTA  12/02/2022

## 2022-12-08 ENCOUNTER — CLINICAL SUPPORT (OUTPATIENT)
Dept: REHABILITATION | Facility: HOSPITAL | Age: 67
End: 2022-12-08
Payer: MEDICARE

## 2022-12-08 DIAGNOSIS — M25.69 DECREASED ROM OF TRUNK AND BACK: Primary | ICD-10-CM

## 2022-12-08 DIAGNOSIS — R29.898 DECREASED STRENGTH OF TRUNK AND BACK: ICD-10-CM

## 2022-12-08 PROCEDURE — 97110 THERAPEUTIC EXERCISES: CPT | Mod: PN | Performed by: PHYSICAL MEDICINE & REHABILITATION

## 2022-12-08 NOTE — PATIENT INSTRUCTIONS
"HEALTHY BACK TOOLS        KEEP YOUR SPINE FEELING FINE   HEALTHY HABITS   Do your "GO TO" stretches 2/day   Get a good night's REST   Watch your POSTURE in sitting/standing Drink PLENTY of water   Use a lumbar roll Eat LOTS of fruits & vegetables   GET UP often (walk and/or stretch) Manage your STRESS   Make your workplace IDEAL FOR YOU  Don't smoke   Lift correctly EXERCISE                           WHAT TO DO WHEN SYMPTOMS FLARE UP  Back and neck pain may occasionally flare up.  If you experience a flare   up, remember your tools. Be encouraged, by remembering that flare-ups will   usually pass.   My Tools:    ~Use your "Go To" Stretches/Positions   ~Keep Moving-pain usually gets better if you move  ~Z lie (with or without ice)  10 min several times a day until symptoms reduce  ~Slowly resume normal activities   ~Practice Deep Breathing and Relaxation techniques                                                 MY EXERCISE PLAN  GO TO STRETCHES  2/day (like brushing your teeth) STRENGTHENING  2-3 times/week CARDIO PROGRAM  3/week   Stand and stretch backward  Bridge with arm press into mat walk   Cat camel on hands and knees Superman's on bed bike   Lower trunk rotation lying down Superman's on ball     Pelvic tilts       Pull knees to chest/child's pose          "

## 2022-12-08 NOTE — PROGRESS NOTES
[facility] -discharge note     Name: Radha Fraga  Clinic Number: [id number]    Therapy Diagnosis:   Encounter Diagnoses   Name Primary?    Decreased ROM of trunk and back Yes    Decreased strength of trunk and back        Physician: Wilber Eduardo MD    Visit Date: 2022    Physician Orders: PT Eval and Treat    Medical Diagnosis from Referral: M54.50,G89.29 (ICD-10-CM) - Chronic midline low back pain without sciatica  Evaluation Date: 2022  Visit # / Visits authorized:  -discharge    Time In: 900 am  Time Out: 1005 am  Total Billable Time: 55 minutes  Insurance type:  Fee for service Insurance Patient    Precautions: standard     Pattern of pain determined: 1 PEP  Subjective   Radha reports she is feeling really good. No pain to report. She is interested in D/C  and wellness.    She feels ready and is functioning well.      Patient reports tolerating previous visit with fatigue.  Patient reports their pain to be 0/10 on a 0-10 scale with 0 being no pain and 10 being the worst pain imaginable.  Pain Location: low back     Occupation: retired  Leisure: read      Pts goals: get stronger and stop aching met 22    Objective     Baseline Isometric Testing on Med X equipment: Testing administered by PT  Date of testin22  ROM 0-42 deg   Max Peak Torque 133    Min Peak Torque 76    Flex/Ext Ratio 2/1   % below normative data Normative strength 0-24 degrees with 16 %  below  below normative data at 36-42 degrees and she has pain in and out of flexion, which is indicative of strength issues in this range      Midpoint Isometric Testing on Med X equipment: Testing administered by PT  Date of testing: 10/28/2022  ROM 0-54 deg   Max Peak Torque 172   Min Peak Torque 117   Flex/Ext Ratio 1.4:1   % above normative data 52%   % increase in strength 32%         Limitation/Restriction for FOTO Lumbar Survey     Therapist reviewed FOTO scores for Rdaha Fraga on  9/1/2022.   FOTO documents entered into EPIC - see Media section.     Limitation Score: 28% limited initial  FOTO 6% limited 11/14/22  MET  Goal < 20 % limited       Range of motion of back WFL    Treatment    Pt was instructed in and performed the following:     Radha received therapeutic exercises to develop/improved posture, cardiovascular endurance, muscular endurance, lumbar ROM, strength and muscular endurance for 55 minutes including the following exercises:     Reviewed and performed HEP and discharge handout given         MY EXERCISE PLAN  GO TO STRETCHES  2/day (like brushing your teeth) STRENGTHENING  2-3 times/week CARDIO PROGRAM  3/week   Stand and stretch backward  Bridge with arm press into mat walk   Cat camel on hands and knees Superman's on bed bike   Lower trunk rotation lying down Superman's on ball     Pelvic tilts       Pull knees to chest/child's pose          [facility] 12/8/2022   Visit Number 13   VAS Pain Rating 0   Treadmill Time (in min.) 10   Speed -   Extension in Lying 10   Extension in Standing 10   Flexion in Lying -   Flexion in Sitting -   Lumbar Extension Seat Pad -   Femur Restraint -   Top Dead Center -   Counterweight -   Lumbar Flexion -   Lumbar Extension -   Lumbar Peak Torque -   Min Torque -   Test Percent Below Normative Data -   Test Percent Gain in Strength from Initial  -   Lumbar Weight 64   Repetitions 20   Rating of Perceived Exertion 3   Ice - Z Lie (in min.) -      Peripheral muscle strengthening which included 1 set of 15-20 repetitions at a slow, controlled 10-13 second per rep pace focused on strengthening supporting musculature for improved body mechanics and functional mobility.  Pt and therapist focused on proper form during treatment to ensure optimal strengthening of each targeted muscle group.  Machines were utilized including torso rotation, rowing, triceps,leg extension, leg curl, hip abd, hip add, and leg press.       Radha received the  following manual therapy techniques: Null      Home Exercises Provided and Patient Education Provided          MY EXERCISE PLAN  GO TO STRETCHES  2/day (like brushing your teeth) STRENGTHENING  2-3 times/week CARDIO PROGRAM  3/week   Stand and stretch backward  Bridge with arm press into mat walk   Cat camel on hands and knees Superman's on bed bike   Lower trunk rotation lying down Superman's on ball     Pelvic tilts       Pull knees to chest/child's pose        Posture/ Using Lumbar Roll: using    Education provided:   - Program progression  - wellness program at discharge  -lifting information    Written Home Exercises Provided: Patient instructed to cont prior HEP.  Exercises were reviewed and Radha was able to demonstrate them prior to the end of the session.  Radha demonstrated good  understanding of the education provided.     See EMR under Patient Instructions for exercises provided 12/8/22     Assessment   Patient has attended 13 visits of the [facility] program for aerobic work, med ex isometric testing with dynamic strengthening on med ex equipment for spine, whole body strengthening on med ex equipment, HEP, education.  Patient has completed [facility] Program and is ready to be transitioned into wellness program.  Importance of wellness program, and attending 1/week to maintain strength stressed.  Importance of continuing there ex and body mech and ergonomics stressed.   Patient demonstrates improvement in ability to reduce symptoms, improved posture, improved ROM and improved strength.   Reviewed HEP, and importance of maintaining a healthy spine through continued stretching and performance of HEP, good posture, good ergonomics, good body mech with ADL, leisure, and work.  Discharge handout with HEP given, and discussed aspects of exercise program, cardio, strengthening, and stretching.    Patient expressed understanding information given.  Patient plans to attend wellness and is ready to transition  to wellness.      -Improved posture,   she is lumbar using lumbar roll  -Improved 0-45 ROM,  initially on med ex test 0-45 and   currently 0-54  -Improved strength at each test point on lumbar med ex IM test with   32%  average improvement noted with Reduced pain  Noted by patient  -Initial outcome tool score 28% limited  and current outcome tool score  6% limited indicating reduced pain and improved function           Patient is making good progress towards established goals.  Pt will continue to benefit from skilled outpatient physical therapy to address the deficits stated in the impairment chart, provide pt/family education and to maximize pt's level of independence in the home and community environment.     Anticipated Barriers for therapy: our schedule for scheduling  Pt's spiritual, cultural and educational needs considered and pt agreeable to plan of care and goals as stated below:       GOALS: Pt is in agreement with the following goals.     Short term goals:  6 weeks or 10 visits   1.  Pt will demonstrate increased lumbar ROM by at least 3 degrees from the initial ROM value with improvements noted in functional ROM and ability to perform ADLs.  (MET 10/28/2022)  2.  Pt will demonstrate increased MedX average isometric strength value  by 10% from initial test resulting in improved ability to perform bending, lifting, and carrying activities safely, confidently.  (MET 10/28/2022)  3.  Patient report a reduction in worst pain score by 1-2 points for improved tolerance for 3/10 at worst. (MET 10/28/2022)  4.  Pt able to perform HEP correctly with minimal cueing or supervision from therapist to encourage independent management of symptoms. (MET 10/28/2022)        Long term goals: 10 weeks or 20 visits   1. Pt will demonstrate increased lumbar ROM by at least 6 degrees from initial ROM value, resulting in improved ability to perform functional fwd bending while standing and sitting. (MET 10/28/2022)  2. Pt will  demonstrate increased MedX average isometric strength value  by 20% from initial test resulting in improved ability to perform bending, lifting, and carrying activities safely, confidently. (MET 10/28/2022)  2 UPDATED. Pt will demonstrate increased MedX average isometric strength value  by 40% from initial test resulting in improved ability to perform bending, lifting, and carrying activities safely, confidently. (32% gain at midpoint and discharge at visit 13, this would be met by visit 20  3. Patient to demonstrate ability to independently control and reduce their pain through posture positioning and mechanical movements throughout a typical day.  (12/8/22  MET)  4.  Pt will demonstrate reduced pain and improved functional outcomes as reported on the FOTO by reaching a limitation score of < or = 20% or less in order to demonstrate subjective improvement in pt's condition.    (MET 11/14/22)  5. Pt will demonstrate independence with the HEP at discharge  12/8/22  MET  6.  Return to gym, feel stronger, get off the floor with ease  12/8/22  MET    Plan   D/C to wellness.  Patient agreeable.    Sherron Cesar, PT  12/08/2022

## 2022-12-15 ENCOUNTER — DOCUMENTATION ONLY (OUTPATIENT)
Dept: REHABILITATION | Facility: HOSPITAL | Age: 67
End: 2022-12-15
Payer: MEDICARE

## 2022-12-15 NOTE — PROGRESS NOTES
Health  Wellness Visit Note    Name: Radha Fraga  Clinic Number: [id number]  Physician: Wilber Eduardo MD  Diagnosis: No diagnosis found.  Past Medical History:   Diagnosis Date    Asymptomatic cholelithiasis     Hypothyroidism     Lichen planus     Scoliosis     Unspecified essential hypertension 3/25/2014     Visit Number: 14  Precautions: left shoulder pain      1st PT visit: 9/1/2022  Year of care end date:  9/1/2023  6 Month test scheduled: March 2023 performed:  12 Month test scheduled Sept 2023 performed:  Mind body plan: 2F  Patient level: NP    Time In: 10:00 AM  Time Out: 11:05 AM  Total Treatment Time: 65 minutes    Wellness [facility] 2022  Handout on this week's wellness topic coping provided  along with a discussion on what it means, the benefits, and suggestions for practice.  Reviewed last week's topic of NA-first wellness visit.    Subjective:   Patient reports with no back pain or discomfort. She feels much better since attending [facility].  The stretches have made a big difference.  She stretches every other day.  She would like to join a gym, but looking to gain confidence with .  She signed the liability waiver and interested in paying $25/month for wellness.    Objective:   Radha completed therapeutic stretches (EIL, SLICK) and the following MedX exercise machines: core lumbar, torso rotation l/r, leg extension, leg curl, upright row, chest press, biceps curl, triceps extension, leg press    See exercise log in patient folder for rate of exertion and repetitions completed.       Fitness Machine Education Key:  E=education on equipment initiated and further follow up and education needed  I=independent with  and exercise.  The patient:  Adjusts machines to his/her settings  Uses equipment levers, pins, weights safely  Maintains safe and correct posture while exercising  Moves through exercise with correct pace and control  Gets on and off equipment  safely      Lumbar/Cervical Ext.  Torso Rotation  Leg Press    Leg Extension  Seated Leg Curl  Chest Press    Seated Row  Hip ADD  Hip ABD    Triceps Extension  Bicep Curl  Other:        Assessment: Patient tolerated MedX Core Lumbar Strength and all other peripheral exercises without an increase in symptoms. Patient warmed up on the recumbent elliptical for 7 minutes, stretched, and iced low back for 10 minutes after the workout.     Plan:  Continue with established plan of care towards wellness goals.     Health  : Raiza Pires  12/15/2022

## 2022-12-29 ENCOUNTER — PES CALL (OUTPATIENT)
Dept: ADMINISTRATIVE | Facility: CLINIC | Age: 67
End: 2022-12-29
Payer: MEDICARE

## 2022-12-29 ENCOUNTER — DOCUMENTATION ONLY (OUTPATIENT)
Dept: REHABILITATION | Facility: HOSPITAL | Age: 67
End: 2022-12-29
Payer: MEDICARE

## 2022-12-29 NOTE — PROGRESS NOTES
Health  Wellness Visit Note    Name: Radha Fraga  Clinic Number: [id number]  Physician: Wilber Eduardo MD  Diagnosis: No diagnosis found.  Past Medical History:   Diagnosis Date    Asymptomatic cholelithiasis     Hypothyroidism     Lichen planus     Scoliosis     Unspecified essential hypertension 3/25/2014     Visit Number: 15  Precautions: left shoulder pain      1st PT visit: 9/1/2022  Year of care end date:  9/1/2023  6 Month test scheduled: March 2023 performed:  12 Month test scheduled Sept 2023 performed:  Mind body plan: 2F  Patient level: NP    Time In: 9:00 AM  Time Out: 9:53 AM  Total Treatment Time: 53 minutes    Wellness Vision 2022  Handout on this week's wellness topic [employer]- Organize and Prioritize provided  along with a discussion on what it means, the benefits, and suggestions for practice.  Reviewed last week's topic of NA (patient did not come to Wellness the week before).     Subjective:   Patient reports she hasn't had any back pain. Within the last week she did a lot of traveling, went out of [facility] and to [place] the [place]. She did not get to exercise, do her HEP or stretch much. She played a lot with her grandchildren, who kept her busy. Patient did not ice or apply heat since last Wellness visit.   Patient will reflect on what her 2023 Wellness goal will be and plan of action to achieve those goals.     Objective:   Radha completed therapeutic stretches (EIL, SLICK) and the following MedX exercise machines: core lumbar, torso rotation l/r, leg extension, leg curl, upright row, chest press, biceps curl, triceps extension, leg press    See exercise log in patient folder for rate of exertion and repetitions completed.       Fitness Machine Education Key:  E=education on equipment initiated and further follow up and education needed  I=independent with  and exercise.  The patient:  Adjusts machines to his/her settings  Uses equipment levers, pins,  weights safely  Maintains safe and correct posture while exercising  Moves through exercise with correct pace and control  Gets on and off equipment safely      Lumbar/Cervical Ext.  Torso Rotation  Leg Press    Leg Extension  Seated Leg Curl  Chest Press    Seated Row  Hip ADD  Hip ABD    Triceps Extension  Bicep Curl  Other:        Assessment: Patient tolerated MedX Core Lumbar Strength and all other peripheral exercises without an increase in symptoms. Patient warmed up on the treadmill for 5 minutes, stretched, and iced low back for 10 minutes after the workout.     Plan:  Continue with established plan of care towards wellness goals.     Health  : Radha Xie  12/29/2022

## 2023-01-04 ENCOUNTER — DOCUMENTATION ONLY (OUTPATIENT)
Dept: REHABILITATION | Facility: HOSPITAL | Age: 68
End: 2023-01-04
Payer: MEDICARE

## 2023-01-04 NOTE — PROGRESS NOTES
Health  Wellness Visit Note    Name: Radha Fraga  Clinic Number: [id number]  Physician: Wilber Eduardo MD  Diagnosis: No diagnosis found.  Past Medical History:   Diagnosis Date    Asymptomatic cholelithiasis     Hypothyroidism     Lichen planus     Scoliosis     Unspecified essential hypertension 3/25/2014     Visit Number: 16  Precautions: left shoulder pain      1st PT visit: 9/1/2022  Year of care end date:  9/1/2023  6 Month test scheduled: March 2023 performed:  12 Month test scheduled Sept 2023 performed:  Mind body plan: Plan A- 9 Months  Patient level: B    Time In: 9:15 AM  Time Out: 10:15 AM  Total Treatment Time: 60 minutes    Wellness Vision 2022  Handout on this week's wellness topic Gratitude provided  along with a discussion on what it means, the benefits, and suggestions for practice.  Reviewed last week's topic of [employer]- Organize and Prioritize.    Subjective:   Patient reports she is back pain free today. Over the holidays she hasn't been walking or working out at home but plans to get back to it soon. She helped her granddaughter move over the weekend which kept her moving.Patient does not ice outside of Wellness.     Objective:   Radha completed therapeutic stretches (EIL, SLICK) and the following MedX exercise machines: core lumbar, torso rotation l/r, leg extension, leg curl, upright row, chest press, biceps curl, triceps extension, leg press    See exercise log in patient folder for rate of exertion and repetitions completed.       Fitness Machine Education Key:  E=education on equipment initiated and further follow up and education needed  I=independent with  and exercise.  The patient:  Adjusts machines to his/her settings  Uses equipment levers, pins, weights safely  Maintains safe and correct posture while exercising  Moves through exercise with correct pace and control  Gets on and off equipment safely      Lumbar/Cervical Ext. E Torso Rotation E Leg Press     Leg Extension  Seated Leg Curl  Chest Press    Seated Row  Hip ADD  Hip ABD    Triceps Extension  Bicep Curl  Other:        Assessment: Patient tolerated MedX Core Lumbar Strength and all other peripheral exercises without an increase in symptoms. Patient warmed up on the treadmill for 5 minutes, stretched, and iced low back for 5 minutes after the workout.     Plan:  Continue with established plan of care towards wellness goals.     Health  : Radha Xie  1/4/2023

## 2023-01-18 ENCOUNTER — DOCUMENTATION ONLY (OUTPATIENT)
Dept: REHABILITATION | Facility: HOSPITAL | Age: 68
End: 2023-01-18
Payer: MEDICARE

## 2023-01-18 NOTE — PROGRESS NOTES
Health  Wellness Visit Note    Name: Radha Fraga  Clinic Number: [id number]  Physician: Wilber Eduardo MD  Diagnosis: No diagnosis found.  Past Medical History:   Diagnosis Date    Asymptomatic cholelithiasis     Hypothyroidism     Lichen planus     Scoliosis     Unspecified essential hypertension 3/25/2014     Visit Number: 17  Precautions: left shoulder pain      1st PT visit: 9/1/2022  Year of care end date:  9/1/2023  6 Month test scheduled: March 2023 performed:  12 Month test scheduled Sept 2023 performed:  Mind body plan: Plan A- 9 Months  Patient level: B    Time In: 9:15 AM  Time Out: 9:55 AM  Total Treatment Time: 40 minutes    Wellness Vision 2022  Handout on this week's wellness topic Meditation provided  along with a discussion on what it means, the benefits, and suggestions for practice.  Reviewed last week's topic of n/a (patient did not attend Wellness last week).     Subjective:   Patient reports she has no back pain today. Over the last week she did have some neck and shoulder pain from sleeping. She recently got a custom pillow to help with her sleeping positions. Patient has been very busy with personal commitments so she hasn't been doing her stretches. She did tour a few gyms recently and plans to decide which one would be best for her to get a membership. She is excited to get back moving and potentially do a few group workout classes. Patient iced her back a few times since her last Wellness session.       Objective:   Radha completed therapeutic stretches (EIL, SLICK) and the following MedX exercise machines: core lumbar, torso rotation l/r, leg extension, leg curl, upright row, chest press, biceps curl, triceps extension, leg press    See exercise log in patient folder for rate of exertion and repetitions completed.       Fitness Machine Education Key:  E=education on equipment initiated and further follow up and education needed  I=independent with  and exercise.  The  patient:  Adjusts machines to his/her settings  Uses equipment levers, pins, weights safely  Maintains safe and correct posture while exercising  Moves through exercise with correct pace and control  Gets on and off equipment safely      Lumbar/Cervical Ext. E Torso Rotation E Leg Press    Leg Extension  Seated Leg Curl  Chest Press E   Seated Row E Hip ADD  Hip ABD    Triceps Extension  Bicep Curl  Other:        Assessment: Patient tolerated MedX Core Lumbar Strength and all other peripheral exercises without an increase in symptoms. Patient warmed up on the recumbent elliptical for 5 minutes, stretched, but skipped ice after the workout.   Plan:  Continue with established plan of care towards wellness goals.     Health  : Radha Xie  1/18/2023

## 2023-01-19 ENCOUNTER — LAB VISIT (OUTPATIENT)
Dept: LAB | Facility: HOSPITAL | Age: 68
End: 2023-01-19
Attending: INTERNAL MEDICINE
Payer: MEDICARE

## 2023-01-19 DIAGNOSIS — E06.3 HYPOTHYROIDISM DUE TO HASHIMOTO'S THYROIDITIS: ICD-10-CM

## 2023-01-19 DIAGNOSIS — E03.8 HYPOTHYROIDISM DUE TO HASHIMOTO'S THYROIDITIS: ICD-10-CM

## 2023-01-19 DIAGNOSIS — I10 ESSENTIAL HYPERTENSION: ICD-10-CM

## 2023-01-19 DIAGNOSIS — Z00.00 NORMAL PHYSICAL EXAM: ICD-10-CM

## 2023-01-19 LAB
ALBUMIN SERPL BCP-MCNC: 3.9 G/DL (ref 3.5–5.2)
ALP SERPL-CCNC: 66 U/L (ref 55–135)
ALT SERPL W/O P-5'-P-CCNC: 14 U/L (ref 10–44)
ANION GAP SERPL CALC-SCNC: 9 MMOL/L (ref 8–16)
AST SERPL-CCNC: 16 U/L (ref 10–40)
BASOPHILS # BLD AUTO: 0.02 K/UL (ref 0–0.2)
BASOPHILS NFR BLD: 0.4 % (ref 0–1.9)
BILIRUB SERPL-MCNC: 0.7 MG/DL (ref 0.1–1)
BUN SERPL-MCNC: 18 MG/DL (ref 8–23)
CALCIUM SERPL-MCNC: 9.5 MG/DL (ref 8.7–10.5)
CHLORIDE SERPL-SCNC: 108 MMOL/L (ref 95–110)
CHOLEST SERPL-MCNC: 196 MG/DL (ref 120–199)
CHOLEST/HDLC SERPL: 3 {RATIO} (ref 2–5)
CO2 SERPL-SCNC: 25 MMOL/L (ref 23–29)
CREAT SERPL-MCNC: 0.9 MG/DL (ref 0.5–1.4)
DIFFERENTIAL METHOD: ABNORMAL
EOSINOPHIL # BLD AUTO: 0.1 K/UL (ref 0–0.5)
EOSINOPHIL NFR BLD: 2.3 % (ref 0–8)
ERYTHROCYTE [DISTWIDTH] IN BLOOD BY AUTOMATED COUNT: 13.1 % (ref 11.5–14.5)
EST. GFR  (NO RACE VARIABLE): >60 ML/MIN/1.73 M^2
GLUCOSE SERPL-MCNC: 73 MG/DL (ref 70–110)
HCT VFR BLD AUTO: 44.4 % (ref 37–48.5)
HDLC SERPL-MCNC: 66 MG/DL (ref 40–75)
HDLC SERPL: 33.7 % (ref 20–50)
HGB BLD-MCNC: 13.8 G/DL (ref 12–16)
IMM GRANULOCYTES # BLD AUTO: 0.02 K/UL (ref 0–0.04)
IMM GRANULOCYTES NFR BLD AUTO: 0.4 % (ref 0–0.5)
LDLC SERPL CALC-MCNC: 114.4 MG/DL (ref 63–159)
LYMPHOCYTES # BLD AUTO: 1.8 K/UL (ref 1–4.8)
LYMPHOCYTES NFR BLD: 34.7 % (ref 18–48)
MCH RBC QN AUTO: 30.5 PG (ref 27–31)
MCHC RBC AUTO-ENTMCNC: 31.1 G/DL (ref 32–36)
MCV RBC AUTO: 98 FL (ref 82–98)
MONOCYTES # BLD AUTO: 0.4 K/UL (ref 0.3–1)
MONOCYTES NFR BLD: 8.5 % (ref 4–15)
NEUTROPHILS # BLD AUTO: 2.8 K/UL (ref 1.8–7.7)
NEUTROPHILS NFR BLD: 53.7 % (ref 38–73)
NONHDLC SERPL-MCNC: 130 MG/DL
NRBC BLD-RTO: 0 /100 WBC
PLATELET # BLD AUTO: 269 K/UL (ref 150–450)
PMV BLD AUTO: 9.9 FL (ref 9.2–12.9)
POTASSIUM SERPL-SCNC: 4.7 MMOL/L (ref 3.5–5.1)
PROT SERPL-MCNC: 7 G/DL (ref 6–8.4)
RBC # BLD AUTO: 4.52 M/UL (ref 4–5.4)
SODIUM SERPL-SCNC: 142 MMOL/L (ref 136–145)
TRIGL SERPL-MCNC: 78 MG/DL (ref 30–150)
TSH SERPL DL<=0.005 MIU/L-ACNC: 3.49 UIU/ML (ref 0.4–4)
WBC # BLD AUTO: 5.19 K/UL (ref 3.9–12.7)

## 2023-01-19 PROCEDURE — 80061 LIPID PANEL: CPT | Performed by: INTERNAL MEDICINE

## 2023-01-19 PROCEDURE — 84443 ASSAY THYROID STIM HORMONE: CPT | Performed by: INTERNAL MEDICINE

## 2023-01-19 PROCEDURE — 36415 COLL VENOUS BLD VENIPUNCTURE: CPT | Mod: PO | Performed by: INTERNAL MEDICINE

## 2023-01-19 PROCEDURE — 80053 COMPREHEN METABOLIC PANEL: CPT | Performed by: INTERNAL MEDICINE

## 2023-01-19 PROCEDURE — 85025 COMPLETE CBC W/AUTO DIFF WBC: CPT | Performed by: INTERNAL MEDICINE

## 2023-01-26 ENCOUNTER — OFFICE VISIT (OUTPATIENT)
Dept: FAMILY MEDICINE | Facility: CLINIC | Age: 68
End: 2023-01-26
Payer: MEDICARE

## 2023-01-26 VITALS
OXYGEN SATURATION: 96 % | HEIGHT: 69 IN | WEIGHT: 176.06 LBS | SYSTOLIC BLOOD PRESSURE: 134 MMHG | DIASTOLIC BLOOD PRESSURE: 84 MMHG | HEART RATE: 81 BPM | TEMPERATURE: 98 F | BODY MASS INDEX: 26.08 KG/M2

## 2023-01-26 DIAGNOSIS — E03.8 HYPOTHYROIDISM DUE TO HASHIMOTO'S THYROIDITIS: Chronic | ICD-10-CM

## 2023-01-26 DIAGNOSIS — Z12.31 ENCOUNTER FOR SCREENING MAMMOGRAM FOR MALIGNANT NEOPLASM OF BREAST: ICD-10-CM

## 2023-01-26 DIAGNOSIS — E06.3 HYPOTHYROIDISM DUE TO HASHIMOTO'S THYROIDITIS: Chronic | ICD-10-CM

## 2023-01-26 DIAGNOSIS — Z23 NEEDS FLU SHOT: ICD-10-CM

## 2023-01-26 DIAGNOSIS — D12.6 TUBULAR ADENOMA OF COLON: ICD-10-CM

## 2023-01-26 DIAGNOSIS — E78.5 DYSLIPIDEMIA: ICD-10-CM

## 2023-01-26 DIAGNOSIS — I10 ESSENTIAL HYPERTENSION: ICD-10-CM

## 2023-01-26 DIAGNOSIS — G89.29 CHRONIC MIDLINE LOW BACK PAIN WITHOUT SCIATICA: ICD-10-CM

## 2023-01-26 DIAGNOSIS — M54.50 CHRONIC MIDLINE LOW BACK PAIN WITHOUT SCIATICA: ICD-10-CM

## 2023-01-26 DIAGNOSIS — G43.909 MIGRAINE SYNDROME: Chronic | ICD-10-CM

## 2023-01-26 DIAGNOSIS — Z00.00 NORMAL PHYSICAL EXAM: Primary | ICD-10-CM

## 2023-01-26 DIAGNOSIS — M85.89 OSTEOPENIA OF MULTIPLE SITES: ICD-10-CM

## 2023-01-26 PROCEDURE — G0008 ADMIN INFLUENZA VIRUS VAC: HCPCS | Mod: S$GLB,,, | Performed by: INTERNAL MEDICINE

## 2023-01-26 PROCEDURE — 1126F PR PAIN SEVERITY QUANTIFIED, NO PAIN PRESENT: ICD-10-PCS | Mod: CPTII,S$GLB,, | Performed by: INTERNAL MEDICINE

## 2023-01-26 PROCEDURE — 90694 VACC AIIV4 NO PRSRV 0.5ML IM: CPT | Mod: S$GLB,,, | Performed by: INTERNAL MEDICINE

## 2023-01-26 PROCEDURE — 3288F PR FALLS RISK ASSESSMENT DOCUMENTED: ICD-10-PCS | Mod: CPTII,S$GLB,, | Performed by: INTERNAL MEDICINE

## 2023-01-26 PROCEDURE — 3008F BODY MASS INDEX DOCD: CPT | Mod: CPTII,S$GLB,, | Performed by: INTERNAL MEDICINE

## 2023-01-26 PROCEDURE — 99999 PR PBB SHADOW E&M-EST. PATIENT-LVL III: CPT | Mod: PBBFAC,,, | Performed by: INTERNAL MEDICINE

## 2023-01-26 PROCEDURE — 3079F DIAST BP 80-89 MM HG: CPT | Mod: CPTII,S$GLB,, | Performed by: INTERNAL MEDICINE

## 2023-01-26 PROCEDURE — 4010F PR ACE/ARB THEARPY RXD/TAKEN: ICD-10-PCS | Mod: CPTII,S$GLB,, | Performed by: INTERNAL MEDICINE

## 2023-01-26 PROCEDURE — 1159F MED LIST DOCD IN RCRD: CPT | Mod: CPTII,S$GLB,, | Performed by: INTERNAL MEDICINE

## 2023-01-26 PROCEDURE — G0008 FLU VACCINE - QUADRIVALENT - ADJUVANTED: ICD-10-PCS | Mod: S$GLB,,, | Performed by: INTERNAL MEDICINE

## 2023-01-26 PROCEDURE — 99397 PR PREVENTIVE VISIT,EST,65 & OVER: ICD-10-PCS | Mod: S$GLB,,, | Performed by: INTERNAL MEDICINE

## 2023-01-26 PROCEDURE — 1160F RVW MEDS BY RX/DR IN RCRD: CPT | Mod: CPTII,S$GLB,, | Performed by: INTERNAL MEDICINE

## 2023-01-26 PROCEDURE — 99999 PR PBB SHADOW E&M-EST. PATIENT-LVL III: ICD-10-PCS | Mod: PBBFAC,,, | Performed by: INTERNAL MEDICINE

## 2023-01-26 PROCEDURE — 3008F PR BODY MASS INDEX (BMI) DOCUMENTED: ICD-10-PCS | Mod: CPTII,S$GLB,, | Performed by: INTERNAL MEDICINE

## 2023-01-26 PROCEDURE — 1101F PT FALLS ASSESS-DOCD LE1/YR: CPT | Mod: CPTII,S$GLB,, | Performed by: INTERNAL MEDICINE

## 2023-01-26 PROCEDURE — 90694 FLU VACCINE - QUADRIVALENT - ADJUVANTED: ICD-10-PCS | Mod: S$GLB,,, | Performed by: INTERNAL MEDICINE

## 2023-01-26 PROCEDURE — 3075F SYST BP GE 130 - 139MM HG: CPT | Mod: CPTII,S$GLB,, | Performed by: INTERNAL MEDICINE

## 2023-01-26 PROCEDURE — 1101F PR PT FALLS ASSESS DOC 0-1 FALLS W/OUT INJ PAST YR: ICD-10-PCS | Mod: CPTII,S$GLB,, | Performed by: INTERNAL MEDICINE

## 2023-01-26 PROCEDURE — 1160F PR REVIEW ALL MEDS BY PRESCRIBER/CLIN PHARMACIST DOCUMENTED: ICD-10-PCS | Mod: CPTII,S$GLB,, | Performed by: INTERNAL MEDICINE

## 2023-01-26 PROCEDURE — 4010F ACE/ARB THERAPY RXD/TAKEN: CPT | Mod: CPTII,S$GLB,, | Performed by: INTERNAL MEDICINE

## 2023-01-26 PROCEDURE — 1159F PR MEDICATION LIST DOCUMENTED IN MEDICAL RECORD: ICD-10-PCS | Mod: CPTII,S$GLB,, | Performed by: INTERNAL MEDICINE

## 2023-01-26 PROCEDURE — 3079F PR MOST RECENT DIASTOLIC BLOOD PRESSURE 80-89 MM HG: ICD-10-PCS | Mod: CPTII,S$GLB,, | Performed by: INTERNAL MEDICINE

## 2023-01-26 PROCEDURE — 1126F AMNT PAIN NOTED NONE PRSNT: CPT | Mod: CPTII,S$GLB,, | Performed by: INTERNAL MEDICINE

## 2023-01-26 PROCEDURE — 3075F PR MOST RECENT SYSTOLIC BLOOD PRESS GE 130-139MM HG: ICD-10-PCS | Mod: CPTII,S$GLB,, | Performed by: INTERNAL MEDICINE

## 2023-01-26 PROCEDURE — 99397 PER PM REEVAL EST PAT 65+ YR: CPT | Mod: S$GLB,,, | Performed by: INTERNAL MEDICINE

## 2023-01-26 PROCEDURE — 3288F FALL RISK ASSESSMENT DOCD: CPT | Mod: CPTII,S$GLB,, | Performed by: INTERNAL MEDICINE

## 2023-01-26 RX ORDER — ATORVASTATIN CALCIUM 10 MG/1
10 TABLET, FILM COATED ORAL DAILY
Qty: 90 TABLET | Refills: 3 | Status: SHIPPED | OUTPATIENT
Start: 2023-01-26 | End: 2023-11-30

## 2023-01-26 RX ORDER — LOSARTAN POTASSIUM 25 MG/1
25 TABLET ORAL DAILY
Qty: 90 TABLET | Refills: 3 | Status: SHIPPED | OUTPATIENT
Start: 2023-01-26 | End: 2023-03-20

## 2023-01-26 RX ORDER — LEVOTHYROXINE SODIUM 75 UG/1
75 TABLET ORAL
Qty: 90 TABLET | Refills: 3 | Status: SHIPPED | OUTPATIENT
Start: 2023-01-26 | End: 2023-03-20

## 2023-01-26 NOTE — PROGRESS NOTES
This note was created by combination of typed  and M-Modal dictation.  Transcription errors may be present.  If there are any questions, please contact me.    Assessment and Plan:   Normal physical exam  Encounter for screening mammogram for malignant neoplasm of breast  Tubular adenoma of colon 2013 and 2018  -pre visit labs stable.    Mammogram ordered  -     CBC Auto Differential; Future; Expected date: 01/25/2024  -     Comprehensive Metabolic Panel; Future; Expected date: 01/25/2024  -     Lipid Panel; Future; Expected date: 01/25/2024  -     TSH; Future; Expected date: 01/25/2024  -     Comprehensive Metabolic Panel; Future; Expected date: 07/26/2023  -     Lipid Panel; Future; Expected date: 07/26/2023  -     Mammo Digital Screening Bilat w/ Raul; Future; Expected date: 01/26/2023      Essential hypertension  -enrolled in digital monitoring but has not been monitoring.  Encouraged to restart   Refilled losartan to mail order  -     CBC Auto Differential; Future; Expected date: 01/25/2024  -     Comprehensive Metabolic Panel; Future; Expected date: 01/25/2024  -     TSH; Future; Expected date: 01/25/2024  -     losartan (COZAAR) 25 MG tablet; Take 1 tablet (25 mg total) by mouth once daily.  Dispense: 90 tablet; Refill: 3  -     Comprehensive Metabolic Panel; Future; Expected date: 07/26/2023    Hypothyroidism due to Hashimoto's thyroiditis  -pre visit labs good, no changes refilled to pharmacy  -     TSH; Future; Expected date: 01/25/2024  -     levothyroxine (SYNTHROID) 75 MCG tablet; Take 1 tablet (75 mcg total) by mouth before breakfast.  Dispense: 90 tablet; Refill: 3    Dyslipidemia  -10 year risk score greater than 7.5%.  Long discussion with the patient.  After discussion she would be agreeable to start low-dose statin.  Side effect profile discussed.  Lipitor 10.  Check labs in 6 months.  -     Lipid Panel; Future; Expected date: 01/25/2024  -     atorvastatin (LIPITOR) 10 MG tablet; Take 1  tablet (10 mg total) by mouth once daily.  Dispense: 90 tablet; Refill: 3  -     Comprehensive Metabolic Panel; Future; Expected date: 2023  -     Lipid Panel; Future; Expected date: 2023    Migraine syndrome  -stable    Chronic midline low back pain without sciatica  -stable    Osteopenia of multiple sites  -stable, DEXA scan done     Needs flu shot  -     Influenza - Quadrivalent (Adjuvanted)          Medications Discontinued During This Encounter   Medication Reason    acetaminophen tablet 650 mg     levothyroxine (SYNTHROID) 75 MCG tablet Reorder    losartan (COZAAR) 25 MG tablet Reorder       meds sent this encounter:  Medications Ordered This Encounter   Medications    atorvastatin (LIPITOR) 10 MG tablet     Sig: Take 1 tablet (10 mg total) by mouth once daily.     Dispense:  90 tablet     Refill:  3    levothyroxine (SYNTHROID) 75 MCG tablet     Sig: Take 1 tablet (75 mcg total) by mouth before breakfast.     Dispense:  90 tablet     Refill:  3     Pharmacy update refills, keep on file, not requesting Rx to be filled today.    losartan (COZAAR) 25 MG tablet     Sig: Take 1 tablet (25 mg total) by mouth once daily.     Dispense:  90 tablet     Refill:  3     Pharmacy update refills, keep on file, not requesting Rx to be filled today.       Follow Up: No follow-ups on file.  Labs six-month CMP lipid. PEx 1 year     Subjective:     Chief Complaint   Patient presents with    Annual Exam       HPI  Radha is a 67 y.o. female.    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 Patient is postmenopausal.    Last  appointment with this clinic was Visit date not found. Last visit with me 1/25/2022   To summarize last visit and events leading up to today:  Hypertension I wanted her to enroll in digital monitoring   Hypothyroid on replacement   Constipation, stool softeners and if persisting move up colonoscopy   Migraines    Pre visit labs  CBC normal   CMP normal   Lipid profile okay non    TSH normal     Last 5 Patient Entered Readings                Current 30 Day Average:   Recent Readings 7/20/2022 2/4/2022 2/3/2022 2/3/2022 2/3/2022   SBP (mmHg) 139 131 130 141 157   DBP (mmHg) 88 87 84 86 93   Pulse 72 69 53 55 55               Today's visit:  Has been walking more  Joined the gym  No digital monitoring x 7/2022    Had been limiting carbs  And went to a friend's house and high carb meal and felt like BP was high but did not take it.      The 10-year ASCVD risk score (Caitlin NOLASCO, et al., 2019) is: 11.8%    Values used to calculate the score:      Age: 67 years      Sex: Female      Is Non- : No      Diabetic: No      Tobacco smoker: No      Systolic Blood Pressure: 134 mmHg      Is BP treated: Yes      HDL Cholesterol: 66 mg/dL      Total Cholesterol: 196 mg/dL    Is familiar with atorva -  takes it  Long discussion about pt's own risk; after discussion would be agreeable to take.     working as a  at .  Long hours.     Patient Care Team:  Chicho Meléndez MD as PCP - General (Internal Medicine)  Edgard Multani II, MD as Consulting Physician (Endocrinology)  Tatianna Latham MD as Consulting Physician (Physical Medicine and Rehabilitation)  Josh Daily MD (Inactive) as Obstetrician (Obstetrics)  Noé Joyce MA as Care Coordinator  Marissa Warner as Digital Medicine Health   Olive Joe PharmD as Hypertension Digital Medicine Clinician  Chicho Meléndez MD as Hypertension Digital Medicine Responsible Provider (Internal Medicine)    Patient  Active Problem List    Diagnosis Date Noted    Decreased ROM of trunk and back 09/01/2022    Decreased strength of trunk and back 09/01/2022    Osteopenia of multiple sites 02/10/2022     02/08/2022 DEXA scan L-spine-1.4, total hip-0.2, femoral neck 0.1.  FRAX score 0.5/6.9%.  Osteopenia.  Compared to previous, 5% decline total hip.      Palpitations 02/03/2021    COVID-19 virus detected 02/03/2021    Chronic midline low back pain without sciatica 08/10/2016     8/10/2016 MRI L spine: 1. Significant degenerative disc disease at L5-S1 resulting in mild bilateral neuroforaminal narrowing.  Otherwise mild multilevel lumbar spondylosis. 2. Cholelithiasis.  MRI of T spine showed: 1. Mild degenerative changes of the thoracic spine. No spinal canal stenosis or neuroforaminal narrowing.  Spinal cord signal and morphology are maintained.2.  Cholelithiasis. 3.  Subcentimeter T2 hyperintense lesion at the liver dome incompletely characterized.      Hypothyroidism due to Hashimoto's thyroiditis 06/16/2015    Migraine syndrome 05/19/2015     Symptoms - tightness in the back of the neck and scotomas - really more opthalmic migraine.  5/18/2015 MRI brain neg  5/18/2015 carotid US negative.      Essential hypertension 03/25/2014     2/10/2021 TTE LV normal size with eccentric hypertrophy and normal systolic function LVEF 60%.  Indeterminate diastolic function.  Normal RV size and systolic function.  No pulmonary hypertension.        Tubular adenoma of colon 2013 and 2018 03/25/2014 1/11/2013 colonoscopy with sigmoid hyperplastic polyp with suggestion of focal early adenomatous change; melanosis coli.  3/23/2018 colonoscopy rectal sessile tubular adenoma      Alopecia areata 03/25/2014    Asymptomatic cholelithiasis 10/13/2012    Lichen planus 10/13/2012    Scoliosis 10/13/2012       PAST MEDICAL PROBLEMS, PAST SURGICAL HISTORY: please see relevant portions of the electronic medical record    ALLERGIES AND MEDICATIONS: updated  "and reviewed.  Review of patient's allergies indicates:   Allergen Reactions    Iodinated contrast media        Medication List with Changes/Refills   Current Medications    LEVOTHYROXINE (SYNTHROID) 75 MCG TABLET    Take 1 tablet (75 mcg total) by mouth before breakfast.    LOSARTAN (COZAAR) 25 MG TABLET    Take 1 tablet (25 mg total) by mouth once daily.      Review of Systems   Constitutional:  Negative for fever, malaise/fatigue and weight loss.   HENT:  Negative for congestion.    Eyes:  Negative for blurred vision and pain.   Respiratory:  Negative for shortness of breath and wheezing.    Cardiovascular:  Negative for chest pain, palpitations and leg swelling.   Gastrointestinal:  Negative for abdominal pain, blood in stool, constipation, diarrhea and heartburn.   Genitourinary:  Negative for dysuria, hematuria and urgency.   Neurological:  Negative for tingling, focal weakness, weakness and headaches.     Objective:   Physical Exam   Vitals:    01/26/23 1425   BP: 134/84   BP Location: Left arm   Patient Position: Sitting   BP Method: Large (Manual)   Pulse: 81   Temp: 98 °F (36.7 °C)   TempSrc: Oral   SpO2: 96%   Weight: 79.9 kg (176 lb 0.6 oz)   Height: 5' 9" (1.753 m)    Body mass index is 26 kg/m².            Physical Exam  Constitutional:       Appearance: She is well-developed.   HENT:      Right Ear: Tympanic membrane, ear canal and external ear normal.      Left Ear: Tympanic membrane, ear canal and external ear normal.   Eyes:      General: No scleral icterus.     Extraocular Movements: Extraocular movements intact.      Pupils: Pupils are equal, round, and reactive to light.   Neck:      Thyroid: No thyromegaly.   Cardiovascular:      Rate and Rhythm: Normal rate and regular rhythm.      Heart sounds: Normal heart sounds. No murmur heard.  Pulmonary:      Effort: Pulmonary effort is normal.      Breath sounds: Normal breath sounds. No wheezing.   Abdominal:      Palpations: Abdomen is soft. There is " no hepatomegaly, splenomegaly or mass.      Tenderness: There is no abdominal tenderness.   Musculoskeletal:         General: No deformity. Normal range of motion.      Cervical back: Neck supple.      Right lower leg: No edema.      Left lower leg: No edema.   Lymphadenopathy:      Cervical: No cervical adenopathy.   Skin:     General: Skin is warm and dry.      Findings: No rash.      Comments: On exposed skin   Neurological:      Mental Status: She is alert and oriented to person, place, and time.      Deep Tendon Reflexes: Reflexes are normal and symmetric.   Psychiatric:         Behavior: Behavior normal.         Thought Content: Thought content normal.         Judgment: Judgment normal.       Component      Latest Ref Rng & Units 1/19/2023 1/26/2022   WBC      3.90 - 12.70 K/uL 5.19 6.15   RBC      4.00 - 5.40 M/uL 4.52 4.54   Hemoglobin      12.0 - 16.0 g/dL 13.8 14.1   Hematocrit      37.0 - 48.5 % 44.4 43.8   MCV      82 - 98 fL 98 97   MCH      27.0 - 31.0 pg 30.5 31.1 (H)   MCHC      32.0 - 36.0 g/dL 31.1 (L) 32.2   RDW      11.5 - 14.5 % 13.1 12.7   Platelets      150 - 450 K/uL 269 230   MPV      9.2 - 12.9 fL 9.9 10.0   Immature Granulocytes      0.0 - 0.5 % 0.4 0.2   Gran # (ANC)      1.8 - 7.7 K/uL 2.8 3.9   Immature Grans (Abs)      0.00 - 0.04 K/uL 0.02 0.01   Lymph #      1.0 - 4.8 K/uL 1.8 1.7   Mono #      0.3 - 1.0 K/uL 0.4 0.5   Eos #      0.0 - 0.5 K/uL 0.1 0.1   Baso #      0.00 - 0.20 K/uL 0.02 0.02   nRBC      0 /100 WBC 0 0   Gran %      38.0 - 73.0 % 53.7 63.8   Lymph %      18.0 - 48.0 % 34.7 26.8   Mono %      4.0 - 15.0 % 8.5 7.6   Eosinophil %      0.0 - 8.0 % 2.3 1.3   Basophil %      0.0 - 1.9 % 0.4 0.3   Differential Method       Automated Automated   Sodium      136 - 145 mmol/L 142 142   Potassium      3.5 - 5.1 mmol/L 4.7 3.7   Chloride      95 - 110 mmol/L 108 108   CO2      23 - 29 mmol/L 25 27   Glucose      70 - 110 mg/dL 73 78   BUN      8 - 23 mg/dL 18 18   Creatinine       0.5 - 1.4 mg/dL 0.9 0.8   Calcium      8.7 - 10.5 mg/dL 9.5 9.2   PROTEIN TOTAL      6.0 - 8.4 g/dL 7.0 7.4   Albumin      3.5 - 5.2 g/dL 3.9 4.0   BILIRUBIN TOTAL      0.1 - 1.0 mg/dL 0.7 0.7   Alkaline Phosphatase      55 - 135 U/L 66 73   AST      10 - 40 U/L 16 18   ALT      10 - 44 U/L 14 13   Anion Gap      8 - 16 mmol/L 9 7 (L)   eGFR if African American      >60 mL/min/1.73 m:2  >60.0   eGFR if non African American      >60 mL/min/1.73 m:2  >60.0   eGFR      >60 mL/min/1.73 m:2 >60.0    Cholesterol      120 - 199 mg/dL 196 201 (H)   Triglycerides      30 - 150 mg/dL 78 121   HDL      40 - 75 mg/dL 66 61   LDL Cholesterol External      63.0 - 159.0 mg/dL 114.4 115.8   HDL/Cholesterol Ratio      20.0 - 50.0 % 33.7 30.3   Total Cholesterol/HDL Ratio      2.0 - 5.0 3.0 3.3   Non-HDL Cholesterol      mg/dL 130 140   TSH      0.400 - 4.000 uIU/mL 3.492 3.894

## 2023-01-26 NOTE — PROGRESS NOTES
Patient given flu vaccine via injection. 0 complaints of, tolerated well. Patient advised to wait in lobby 15mins for adverse reaction. Verbalized understanding.

## 2023-01-26 NOTE — PATIENT INSTRUCTIONS
The 10-year ASCVD risk score (Caitlin NOLASCO, et al., 2019) is: 11.8%    Values used to calculate the score:      Age: 67 years      Sex: Female      Is Non- : No      Diabetic: No      Tobacco smoker: No      Systolic Blood Pressure: 134 mmHg      Is BP treated: Yes      HDL Cholesterol: 66 mg/dL      Total Cholesterol: 196 mg/dL

## 2023-02-09 ENCOUNTER — HOSPITAL ENCOUNTER (OUTPATIENT)
Dept: RADIOLOGY | Facility: HOSPITAL | Age: 68
Discharge: HOME | End: 2023-02-09
Attending: INTERNAL MEDICINE
Payer: MEDICARE

## 2023-02-09 DIAGNOSIS — Z12.31 ENCOUNTER FOR SCREENING MAMMOGRAM FOR MALIGNANT NEOPLASM OF BREAST: ICD-10-CM

## 2023-02-09 PROCEDURE — 77067 SCR MAMMO BI INCL CAD: CPT | Mod: TC,PO

## 2023-02-09 PROCEDURE — 77063 MAMMO DIGITAL SCREENING BILAT WITH TOMO: ICD-10-PCS | Mod: 26,,, | Performed by: RADIOLOGY

## 2023-02-09 PROCEDURE — 77067 SCR MAMMO BI INCL CAD: CPT | Mod: 26,,, | Performed by: RADIOLOGY

## 2023-02-09 PROCEDURE — 77067 MAMMO DIGITAL SCREENING BILAT WITH TOMO: ICD-10-PCS | Mod: 26,,, | Performed by: RADIOLOGY

## 2023-02-09 PROCEDURE — 77063 BREAST TOMOSYNTHESIS BI: CPT | Mod: 26,,, | Performed by: RADIOLOGY

## 2023-02-18 ENCOUNTER — PATIENT MESSAGE (OUTPATIENT)
Dept: FAMILY MEDICINE | Facility: CLINIC | Age: 68
End: 2023-02-18
Payer: MEDICARE

## 2023-02-18 DIAGNOSIS — D12.6 TUBULAR ADENOMA OF COLON: Primary | ICD-10-CM

## 2023-03-06 ENCOUNTER — OFFICE VISIT (OUTPATIENT)
Dept: OPHTHALMOLOGY | Facility: CLINIC | Age: 68
End: 2023-03-06
Payer: MEDICARE

## 2023-03-06 ENCOUNTER — CLINICAL SUPPORT (OUTPATIENT)
Dept: OPHTHALMOLOGY | Facility: CLINIC | Age: 68
End: 2023-03-06
Payer: MEDICARE

## 2023-03-06 DIAGNOSIS — H40.1131 PRIMARY OPEN-ANGLE GLAUCOMA, BILATERAL, MILD STAGE: ICD-10-CM

## 2023-03-06 DIAGNOSIS — H40.1131 PRIMARY OPEN-ANGLE GLAUCOMA, BILATERAL, MILD STAGE: Primary | ICD-10-CM

## 2023-03-06 PROCEDURE — 1160F RVW MEDS BY RX/DR IN RCRD: CPT | Mod: CPTII,S$GLB,, | Performed by: OPHTHALMOLOGY

## 2023-03-06 PROCEDURE — 1126F PR PAIN SEVERITY QUANTIFIED, NO PAIN PRESENT: ICD-10-PCS | Mod: CPTII,S$GLB,, | Performed by: OPHTHALMOLOGY

## 2023-03-06 PROCEDURE — 99999 PR PBB SHADOW E&M-EST. PATIENT-LVL II: CPT | Mod: PBBFAC,,, | Performed by: OPHTHALMOLOGY

## 2023-03-06 PROCEDURE — 3288F FALL RISK ASSESSMENT DOCD: CPT | Mod: CPTII,S$GLB,, | Performed by: OPHTHALMOLOGY

## 2023-03-06 PROCEDURE — 99213 OFFICE O/P EST LOW 20 MIN: CPT | Mod: S$GLB,,, | Performed by: OPHTHALMOLOGY

## 2023-03-06 PROCEDURE — 1101F PT FALLS ASSESS-DOCD LE1/YR: CPT | Mod: CPTII,S$GLB,, | Performed by: OPHTHALMOLOGY

## 2023-03-06 PROCEDURE — 99999 PR PBB SHADOW E&M-EST. PATIENT-LVL II: ICD-10-PCS | Mod: PBBFAC,,, | Performed by: OPHTHALMOLOGY

## 2023-03-06 PROCEDURE — 1126F AMNT PAIN NOTED NONE PRSNT: CPT | Mod: CPTII,S$GLB,, | Performed by: OPHTHALMOLOGY

## 2023-03-06 PROCEDURE — 4010F PR ACE/ARB THEARPY RXD/TAKEN: ICD-10-PCS | Mod: CPTII,S$GLB,, | Performed by: OPHTHALMOLOGY

## 2023-03-06 PROCEDURE — 4010F ACE/ARB THERAPY RXD/TAKEN: CPT | Mod: CPTII,S$GLB,, | Performed by: OPHTHALMOLOGY

## 2023-03-06 PROCEDURE — 99213 PR OFFICE/OUTPT VISIT, EST, LEVL III, 20-29 MIN: ICD-10-PCS | Mod: S$GLB,,, | Performed by: OPHTHALMOLOGY

## 2023-03-06 PROCEDURE — 1159F PR MEDICATION LIST DOCUMENTED IN MEDICAL RECORD: ICD-10-PCS | Mod: CPTII,S$GLB,, | Performed by: OPHTHALMOLOGY

## 2023-03-06 PROCEDURE — 1159F MED LIST DOCD IN RCRD: CPT | Mod: CPTII,S$GLB,, | Performed by: OPHTHALMOLOGY

## 2023-03-06 PROCEDURE — 1160F PR REVIEW ALL MEDS BY PRESCRIBER/CLIN PHARMACIST DOCUMENTED: ICD-10-PCS | Mod: CPTII,S$GLB,, | Performed by: OPHTHALMOLOGY

## 2023-03-06 PROCEDURE — 1101F PR PT FALLS ASSESS DOC 0-1 FALLS W/OUT INJ PAST YR: ICD-10-PCS | Mod: CPTII,S$GLB,, | Performed by: OPHTHALMOLOGY

## 2023-03-06 PROCEDURE — 3288F PR FALLS RISK ASSESSMENT DOCUMENTED: ICD-10-PCS | Mod: CPTII,S$GLB,, | Performed by: OPHTHALMOLOGY

## 2023-03-06 NOTE — PROGRESS NOTES
HPI    DLS: 10/17/2022- rev HVF/ Iop     No gtts.     Denies pain/ FOL/ floaters. States no new complaints.     Last edited by Alisa Cooper on 3/6/2023  9:57 AM.            Assessment /Plan     For exam results, see Encounter Report.    Primary open-angle glaucoma, bilateral, mild stage      1. Primary open-angle glaucoma, bilateral, mild stage  Unknown famhx  Thin pachy    Very high IOP OD>OS tmax 38/28  Optic nerves asymmetry OD>OS  Substantial OCT progression OD>OS, continued progression OD  HVF normal, stable and repeat  S/p SLT 2022 OU, excellent response  Target IOP low 20s    IOP excellent  Continue to monitor off meds    However, low threshold to add drops in the face of OCT progression    F/u 6 months, dilate, OCT NFL

## 2023-03-06 NOTE — PROGRESS NOTES
24-2 VF done.        Assessment /Plan     For exam results, see Encounter Report.    There are no diagnoses linked to this encounter.

## 2023-03-20 DIAGNOSIS — I10 ESSENTIAL HYPERTENSION: ICD-10-CM

## 2023-03-20 DIAGNOSIS — E03.8 HYPOTHYROIDISM DUE TO HASHIMOTO'S THYROIDITIS: Chronic | ICD-10-CM

## 2023-03-20 DIAGNOSIS — E06.3 HYPOTHYROIDISM DUE TO HASHIMOTO'S THYROIDITIS: Chronic | ICD-10-CM

## 2023-03-20 RX ORDER — LEVOTHYROXINE SODIUM 75 UG/1
TABLET ORAL
Qty: 90 TABLET | Refills: 3 | Status: SHIPPED | OUTPATIENT
Start: 2023-03-20 | End: 2024-01-26 | Stop reason: SDUPTHER

## 2023-03-20 RX ORDER — LOSARTAN POTASSIUM 25 MG/1
TABLET ORAL
Qty: 90 TABLET | Refills: 3 | Status: SHIPPED | OUTPATIENT
Start: 2023-03-20 | End: 2024-01-26 | Stop reason: SDUPTHER

## 2023-03-20 NOTE — TELEPHONE ENCOUNTER
No new care gaps identified.  [facility] Embedded Care Gaps. Reference number: [id number]. 3/20/2023   12:47:43 AM SHANNAT

## 2023-03-20 NOTE — TELEPHONE ENCOUNTER
Refill Decision Note   Radha Fraga  is requesting a refill authorization.  Brief Assessment and Rationale for Refill:  Approve     Medication Therapy Plan:  TSH WNL 1/19/23; DIFFERENT PHARMACY REQUESTING REFILLS    Medication Reconciliation Completed: No   Comments:     No Care Gaps recommended.     Note composed:6:26 AM 03/20/2023

## 2023-03-21 ENCOUNTER — TELEPHONE (OUTPATIENT)
Dept: FAMILY MEDICINE | Facility: CLINIC | Age: 68
End: 2023-03-21
Payer: MEDICARE

## 2023-03-21 ENCOUNTER — E-VISIT (OUTPATIENT)
Dept: FAMILY MEDICINE | Facility: CLINIC | Age: 68
End: 2023-03-21
Payer: MEDICARE

## 2023-03-21 DIAGNOSIS — J06.9 VIRAL URI: Primary | ICD-10-CM

## 2023-03-21 PROCEDURE — 99421 PR E&M, ONLINE DIGIT, EST, < 7 DAYS, 5-10 MINS: ICD-10-PCS | Mod: ,,, | Performed by: INTERNAL MEDICINE

## 2023-03-21 PROCEDURE — 99421 OL DIG E/M SVC 5-10 MIN: CPT | Mod: ,,, | Performed by: INTERNAL MEDICINE

## 2023-03-21 RX ORDER — BENZONATATE 100 MG/1
100 CAPSULE ORAL 3 TIMES DAILY PRN
Qty: 30 CAPSULE | Refills: 0 | Status: SHIPPED | OUTPATIENT
Start: 2023-03-21 | End: 2023-03-31

## 2023-03-21 RX ORDER — IPRATROPIUM BROMIDE 42 UG/1
2 SPRAY, METERED NASAL 3 TIMES DAILY
Qty: 15 ML | Refills: 0 | Status: SHIPPED | OUTPATIENT
Start: 2023-03-21 | End: 2023-04-12

## 2023-03-21 NOTE — PROGRESS NOTES
Patient ID: Radha Fraga is a 67 y.o. female.    This note was created by combination of typed  and M-Modal dictation.  Transcription errors may be present.  If there are any questions, please contact me.    Assessment and Plan:   1. Viral URI  Reportedly home COVID negative.  Treat for viral.  Supportive care.  Recommend repeating home COVID test again in about 2 days.  - ipratropium (ATROVENT) 42 mcg (0.06 %) nasal spray; 2 sprays by Each Nostril route 3 (three) times daily. AS NEEDED FOR NASAL CONGESTION AND DRIP for 7 days  Dispense: 15 mL; Refill: 0  - benzonatate (TESSALON) 100 MG capsule; Take 1 capsule (100 mg total) by mouth 3 (three) times daily as needed for Cough.  Dispense: 30 capsule; Refill: 0        No orders of the defined types were placed in this encounter.     Medications Ordered This Encounter   Medications    benzonatate (TESSALON) 100 MG capsule     Sig: Take 1 capsule (100 mg total) by mouth 3 (three) times daily as needed for Cough.     Dispense:  30 capsule     Refill:  0    ipratropium (ATROVENT) 42 mcg (0.06 %) nasal spray     Si sprays by Each Nostril route 3 (three) times daily. AS NEEDED FOR NASAL CONGESTION AND DRIP for 7 days     Dispense:  15 mL     Refill:  0        No follow-ups on file. or sooner as needed.        E-Visit Time Tracking:  Day 1 Time (in minutes): 9                274}    Follow Up: No follow-ups on file.    Subjective:   Patient ID: Radha Fraga is a 67 y.o. female.    Chief Complaint: URI                    274}    History of Present Illness:  The patient initiated a request through [facility] on 3/21/2023 for evaluation and management with a chief complaint of URI     I evaluated the questionnaire submission on 2023 .    E-visit Questionnaire:  [facility] Peq Evisit Upper Respitatory/Cough Questionnaire    3/21/2023 10:48 AM CDT - Filed by Patient   Do you agree to participate in an E-Visit? Yes   If you have any of the following symptoms, please  present to your local ER or call 911:  I acknowledge   What is the main issue that you would like for your doctor to address today? Headache, coughing, sinus congestion   Are you able to take your vital signs? No   Are you currently pregnant, could you be pregnant, or are you breast feeding? None of the above   What symptoms do you currently have?  Cough;  Headache;  Nasal Congestion;  Muscle or body aches;  Runny nose;  Sore throat   Have you had a fever? No   When did your symptoms first appear? 3/20/2023   In the last two weeks, have you been in close contact with someone who has COVID-19 or the Flu? No   In the last two weeks, have you worked or volunteered in a healthcare facility or as a ? Healthcare facilities include a hospital, medical or dental clinic, long-term care facility, or nursing home No   Do you live in a long-term care facility, nursing home, group home, or homeless shelter? No   List what you have done or taken to help your symptoms. Dayquil   How severe are your symptoms? Moderate   Have you taken an at home Covid test? Yes   What were the results? Negative   Have you taken a Flu test? No   Have you been fully vaccinated for COVID? (2 Pfizer, 2 Moderna or 1 Maico & Maico vaccine injections) Yes   Have you received a booster? Yes   Have you recieved a Flu shot? Yes   When did you recieve your Flu shot? 1/26/2023   Do you have transportation to get tested for COVID if it is indicated and ordered for you at an [facility] location? Yes   Provide any information you feel is important to your history not asked above    Please attach any relevant images or files      Respiratory infection type symptoms going on for about a day.  Home COVID test negative.  At this point would treat for viral illness with supportive care.    Problem List:  Active Problem List with Overview Notes    Diagnosis Date Noted    Dyslipidemia 01/26/2023    Decreased ROM of trunk and back 09/01/2022    Decreased  strength of trunk and back 09/01/2022    Osteopenia of multiple sites 02/10/2022     02/08/2022 DEXA scan L-spine-1.4, total hip-0.2, femoral neck 0.1.  FRAX score 0.5/6.9%.  Osteopenia.  Compared to previous, 5% decline total hip.      Palpitations 02/03/2021    COVID-19 virus detected 02/03/2021    Chronic midline low back pain without sciatica 08/10/2016     8/10/2016 MRI L spine: 1. Significant degenerative disc disease at L5-S1 resulting in mild bilateral neuroforaminal narrowing.  Otherwise mild multilevel lumbar spondylosis. 2. Cholelithiasis.  MRI of T spine showed: 1. Mild degenerative changes of the thoracic spine. No spinal canal stenosis or neuroforaminal narrowing.  Spinal cord signal and morphology are maintained.2.  Cholelithiasis. 3.  Subcentimeter T2 hyperintense lesion at the liver dome incompletely characterized.      Hypothyroidism due to Hashimoto's thyroiditis 06/16/2015    Migraine syndrome 05/19/2015     Symptoms - tightness in the back of the neck and scotomas - really more opthalmic migraine.  5/18/2015 MRI brain neg  5/18/2015 carotid US negative.      Essential hypertension 03/25/2014     2/10/2021 TTE LV normal size with eccentric hypertrophy and normal systolic function LVEF 60%.  Indeterminate diastolic function.  Normal RV size and systolic function.  No pulmonary hypertension.        Tubular adenoma of colon 2013 and 2018 03/25/2014 1/11/2013 colonoscopy with sigmoid hyperplastic polyp with suggestion of focal early adenomatous change; melanosis coli.  3/23/2018 colonoscopy rectal sessile tubular adenoma      Alopecia areata 03/25/2014    Asymptomatic cholelithiasis 10/13/2012    Lichen planus 10/13/2012    Scoliosis 10/13/2012        Recent Labs Obtained:  No visits with results within 7 Day(s) from this visit.   Latest known visit with results is:   Lab Visit on 01/19/2023   Component Date Value Ref Range Status    WBC 01/19/2023 5.19  3.90 - 12.70 K/uL Final    RBC 01/19/2023  4.52  4.00 - 5.40 M/uL Final    Hemoglobin 01/19/2023 13.8  12.0 - 16.0 g/dL Final    Hematocrit 01/19/2023 44.4  37.0 - 48.5 % Final    MCV 01/19/2023 98  82 - 98 fL Final    MCH 01/19/2023 30.5  27.0 - 31.0 pg Final    MCHC 01/19/2023 31.1 (L)  32.0 - 36.0 g/dL Final    RDW 01/19/2023 13.1  11.5 - 14.5 % Final    Platelets 01/19/2023 269  150 - 450 K/uL Final    MPV 01/19/2023 9.9  9.2 - 12.9 fL Final    Immature Granulocytes 01/19/2023 0.4  0.0 - 0.5 % Final    Gran # (ANC) 01/19/2023 2.8  1.8 - 7.7 K/uL Final    Immature Grans (Abs) 01/19/2023 0.02  0.00 - 0.04 K/uL Final    Comment: Mild elevation in immature granulocytes is non specific and   can be seen in a variety of conditions including stress response,   acute inflammation, trauma and pregnancy. Correlation with other   laboratory and clinical findings is essential.      Lymph # 01/19/2023 1.8  1.0 - 4.8 K/uL Final    Mono # 01/19/2023 0.4  0.3 - 1.0 K/uL Final    Eos # 01/19/2023 0.1  0.0 - 0.5 K/uL Final    Baso # 01/19/2023 0.02  0.00 - 0.20 K/uL Final    nRBC 01/19/2023 0  0 /100 WBC Final    Gran % 01/19/2023 53.7  38.0 - 73.0 % Final    Lymph % 01/19/2023 34.7  18.0 - 48.0 % Final    Mono % 01/19/2023 8.5  4.0 - 15.0 % Final    Eosinophil % 01/19/2023 2.3  0.0 - 8.0 % Final    Basophil % 01/19/2023 0.4  0.0 - 1.9 % Final    Differential Method 01/19/2023 Automated   Final    Sodium 01/19/2023 142  136 - 145 mmol/L Final    Potassium 01/19/2023 4.7  3.5 - 5.1 mmol/L Final    Chloride 01/19/2023 108  95 - 110 mmol/L Final    CO2 01/19/2023 25  23 - 29 mmol/L Final    Glucose 01/19/2023 73  70 - 110 mg/dL Final    BUN 01/19/2023 18  8 - 23 mg/dL Final    Creatinine 01/19/2023 0.9  0.5 - 1.4 mg/dL Final    Calcium 01/19/2023 9.5  8.7 - 10.5 mg/dL Final    Total Protein 01/19/2023 7.0  6.0 - 8.4 g/dL Final    Albumin 01/19/2023 3.9  3.5 - 5.2 g/dL Final    Total Bilirubin 01/19/2023 0.7  0.1 - 1.0 mg/dL Final    Comment: For infants and newborns,  interpretation of results should be based  on gestational age, weight and in agreement with clinical  observations.    Premature Infant recommended reference ranges:  Up to 24 hours.............<8.0 mg/dL  Up to 48 hours............<12.0 mg/dL  3-5 days..................<15.0 mg/dL  6-29 days.................<15.0 mg/dL      Alkaline Phosphatase 01/19/2023 66  55 - 135 U/L Final    AST 01/19/2023 16  10 - 40 U/L Final    ALT 01/19/2023 14  10 - 44 U/L Final    Anion Gap 01/19/2023 9  8 - 16 mmol/L Final    eGFR 01/19/2023 >60.0  >60 mL/min/1.73 m^2 Final    Cholesterol 01/19/2023 196  120 - 199 mg/dL Final    Comment: The National Cholesterol Education Program (NCEP) has set the  following guidelines (reference ranges) for Cholesterol:  Optimal.....................<200 mg/dL  Borderline High.............200-239 mg/dL  High........................> or = 240 mg/dL      Triglycerides 01/19/2023 78  30 - 150 mg/dL Final    Comment: The National Cholesterol Education Program (NCEP) has set the  following guidelines (reference values) for triglycerides:  Normal......................<150 mg/dL  Borderline High.............150-199 mg/dL  High........................200-499 mg/dL      HDL 01/19/2023 66  40 - 75 mg/dL Final    Comment: The National Cholesterol Education Program (NCEP) has set the  following guidelines (reference values) for HDL Cholesterol:  Low...............<40 mg/dL  Optimal...........>60 mg/dL      LDL Cholesterol 01/19/2023 114.4  63.0 - 159.0 mg/dL Final    Comment: The National Cholesterol Education Program (NCEP) has set the  following guidelines (reference values) for LDL Cholesterol:  Optimal.......................<130 mg/dL  Borderline High...............130-159 mg/dL  High..........................160-189 mg/dL  Very High.....................>190 mg/dL      HDL/Cholesterol Ratio 01/19/2023 33.7  20.0 - 50.0 % Final    Total Cholesterol/HDL Ratio 01/19/2023 3.0  2.0 - 5.0 Final    Non-HDL  Cholesterol 01/19/2023 130  mg/dL Final    Comment: Risk category and Non-HDL cholesterol goals:  Coronary heart disease (CHD)or equivalent (10-year risk of CHD >20%):  Non-HDL cholesterol goal     <130 mg/dL  Two or more CHD risk factors and 10-year risk of CHD <= 20%:  Non-HDL cholesterol goal     <160 mg/dL  0 to 1 CHD risk factor:  Non-HDL cholesterol goal     <190 mg/dL      TSH 01/19/2023 3.492  0.400 - 4.000 uIU/mL Final         ALLERGIES AND MEDICATIONS: updated and reviewed.  Review of patient's allergies indicates:   Allergen Reactions    Iodinated contrast media        Medication List with Changes/Refills   Current Medications    ATORVASTATIN (LIPITOR) 10 MG TABLET    Take 1 tablet (10 mg total) by mouth once daily.    LEVOTHYROXINE (SYNTHROID) 75 MCG TABLET    TAKE 1 TABLET BY MOUTH BEFORE BREAKFAST.    LOSARTAN (COZAAR) 25 MG TABLET    TAKE 1 TABLET BY MOUTH EVERY DAY

## 2023-03-21 NOTE — TELEPHONE ENCOUNTER
Spoke with patient experiencing congestion, cough and headaches. Patient stated she took a home Covid test and it came back negative.Patient requesting medication for cold symptoms. Patient scheduled with evisit at this time.

## 2023-06-14 ENCOUNTER — OFFICE VISIT (OUTPATIENT)
Dept: INTERNAL MEDICINE | Facility: CLINIC | Age: 68
End: 2023-06-14
Payer: MEDICARE

## 2023-06-14 DIAGNOSIS — M54.2 NECK PAIN: ICD-10-CM

## 2023-06-14 DIAGNOSIS — M25.511 ACUTE PAIN OF RIGHT SHOULDER: Primary | ICD-10-CM

## 2023-06-14 PROCEDURE — 1160F RVW MEDS BY RX/DR IN RCRD: CPT | Mod: CPTII,95,, | Performed by: HOSPITALIST

## 2023-06-14 PROCEDURE — 1159F PR MEDICATION LIST DOCUMENTED IN MEDICAL RECORD: ICD-10-PCS | Mod: CPTII,95,, | Performed by: HOSPITALIST

## 2023-06-14 PROCEDURE — 4010F ACE/ARB THERAPY RXD/TAKEN: CPT | Mod: CPTII,95,, | Performed by: HOSPITALIST

## 2023-06-14 PROCEDURE — 1159F MED LIST DOCD IN RCRD: CPT | Mod: CPTII,95,, | Performed by: HOSPITALIST

## 2023-06-14 PROCEDURE — 4010F PR ACE/ARB THEARPY RXD/TAKEN: ICD-10-PCS | Mod: CPTII,95,, | Performed by: HOSPITALIST

## 2023-06-14 PROCEDURE — 1160F PR REVIEW ALL MEDS BY PRESCRIBER/CLIN PHARMACIST DOCUMENTED: ICD-10-PCS | Mod: CPTII,95,, | Performed by: HOSPITALIST

## 2023-06-14 PROCEDURE — 99214 OFFICE O/P EST MOD 30 MIN: CPT | Mod: 95,,, | Performed by: HOSPITALIST

## 2023-06-14 PROCEDURE — 99214 PR OFFICE/OUTPT VISIT, EST, LEVL IV, 30-39 MIN: ICD-10-PCS | Mod: 95,,, | Performed by: HOSPITALIST

## 2023-06-14 RX ORDER — METHOCARBAMOL 500 MG/1
500 TABLET, FILM COATED ORAL NIGHTLY PRN
Qty: 30 TABLET | Refills: 0 | Status: SHIPPED | OUTPATIENT
Start: 2023-06-14 | End: 2023-10-23

## 2023-06-14 NOTE — PROGRESS NOTES
Virtual Visit  The patient location is: [place]   The chief complaint leading to consultation is:  Shoulder pain, neck pain  Visit type: Virtual visit with synchronous audio and video  Total time spent with patient: 10 min  Each patient to whom he or she provides medical services by telemedicine is:  (1) informed of the relationship between the physician and patient and the respective role of any other health care provider with respect to management of the patient; and (2) notified that he or she may decline to receive medical services by telemedicine and may withdraw from such care at any time.    Subjective:         @Patient ID: Radha Fraga is a 67 y.o. female.    Chief Complaint: Shoulder Pain    HPI  68 yo F presents for urgent visit with c/o R shoulder pain and neck pain. Started in past 1-2 days. Reports outdoors gardening when symptoms flared  No numbness or tingling. Sometimes also has pain at night when sleeping on her R side  Endorses pain of anterior R shoulder/deltoid area with palpation and also pain of muscle of R trapezius area   Reports ibuprofen 400 mg has helped her symptoms.         Review of Systems   Constitutional:  Negative for activity change and unexpected weight change.   HENT:  Negative for hearing loss, rhinorrhea and trouble swallowing.    Eyes:  Negative for discharge and visual disturbance.   Respiratory:  Negative for chest tightness and wheezing.    Cardiovascular:  Negative for chest pain and palpitations.   Gastrointestinal:  Negative for blood in stool, constipation, diarrhea and vomiting.   Endocrine: Negative for polydipsia and polyuria.   Genitourinary:  Negative for difficulty urinating, dysuria, hematuria and menstrual problem.   Musculoskeletal:  Positive for arthralgias and neck pain. Negative for joint swelling.   Neurological:  Positive for headaches. Negative for weakness.   Psychiatric/Behavioral:  Negative for confusion and dysphoric mood.    Past medical history,  surgical history, and family medical history reviewed and updated as appropriate.    Medications and allergies reviewed.     Objective:     There were no vitals filed for this visit.  There is no height or weight on file to calculate BMI.  Physical Exam  Constitutional:       Appearance: Normal appearance.   HENT:      Head: Normocephalic and atraumatic.   Pulmonary:      Effort: Pulmonary effort is normal.   Neurological:      Mental Status: She is alert and oriented to person, place, and time.   Psychiatric:         Mood and Affect: Mood normal.         Behavior: Behavior normal.       Lab Results   Component Value Date    WBC 5.19 01/19/2023    HGB 13.8 01/19/2023    HCT 44.4 01/19/2023     01/19/2023    CHOL 196 01/19/2023    TRIG 78 01/19/2023    HDL 66 01/19/2023    ALT 14 01/19/2023    AST 16 01/19/2023     01/19/2023    K 4.7 01/19/2023     01/19/2023    CREATININE 0.9 01/19/2023    BUN 18 01/19/2023    CO2 25 01/19/2023    TSH 3.492 01/19/2023    HGBA1C 5.3 03/02/2018       Assessment:     1. Acute pain of right shoulder    2. Neck pain      Plan:   Radha was seen today for shoulder pain.    Diagnoses and all orders for this visit:    Acute pain of right shoulder  - Possibly bursitis?  Ok to continue NSAIDs prn. Will also check shoulder xray and refer to PT   -     X-Ray Shoulder Trauma 3 view Right; Future  -     Ambulatory referral/consult to Physical/Occupational Therapy; Future    Neck pain  - Likely muscular. Start muscle relaxant. Ok to use heat. Will refer to PT  -     Ambulatory referral/consult to Physical/Occupational Therapy; Future    Other orders  -     methocarbamoL (ROBAXIN) 500 MG Tab; Take 1 tablet (500 mg total) by mouth nightly as needed (muscle spasm). May cause sedation       Bev Valenzuela MD  Internal Medicine    6/14/2023

## 2023-06-15 ENCOUNTER — HOSPITAL ENCOUNTER (OUTPATIENT)
Dept: RADIOLOGY | Facility: HOSPITAL | Age: 68
Discharge: HOME | End: 2023-06-15
Attending: HOSPITALIST
Payer: MEDICARE

## 2023-06-15 DIAGNOSIS — M25.511 ACUTE PAIN OF RIGHT SHOULDER: ICD-10-CM

## 2023-06-15 PROCEDURE — 73030 XR SHOULDER TRAUMA 3 VIEW RIGHT: ICD-10-PCS | Mod: 26,RT,, | Performed by: RADIOLOGY

## 2023-06-15 PROCEDURE — 73030 X-RAY EXAM OF SHOULDER: CPT | Mod: 26,RT,, | Performed by: RADIOLOGY

## 2023-06-15 PROCEDURE — 73030 X-RAY EXAM OF SHOULDER: CPT | Mod: TC,FY,PO,RT

## 2023-07-17 ENCOUNTER — CLINICAL SUPPORT (OUTPATIENT)
Dept: REHABILITATION | Facility: HOSPITAL | Age: 68
End: 2023-07-17
Attending: HOSPITALIST
Payer: MEDICARE

## 2023-07-17 DIAGNOSIS — M25.511 ACUTE PAIN OF RIGHT SHOULDER: ICD-10-CM

## 2023-07-17 DIAGNOSIS — M54.2 NECK PAIN: ICD-10-CM

## 2023-07-17 PROCEDURE — 97110 THERAPEUTIC EXERCISES: CPT

## 2023-07-17 PROCEDURE — 97140 MANUAL THERAPY 1/> REGIONS: CPT

## 2023-07-17 PROCEDURE — 97161 PT EVAL LOW COMPLEX 20 MIN: CPT

## 2023-07-17 PROCEDURE — 97112 NEUROMUSCULAR REEDUCATION: CPT

## 2023-07-17 NOTE — PLAN OF CARE
[facility]  Physical Therapy Initial Evaluation    Name: Radha Fraga  Clinic Number: [id number]    Therapy Diagnosis:   Encounter Diagnoses   Name Primary?    Acute pain of right shoulder     Neck pain      Physician: Bev Valenzuela MD    Physician Orders: PT Eval and Treat  Medical Diagnosis from Referral: M25.511 (ICD-10-CM) - Acute pain of right shoulder; M54.2 (ICD-10-CM) - Neck pain  Evaluation Date: 7/17/2023  Authorization Period Expiration: 12/31/2023  Plan of Care Expiration: 10/17/2023   Visit # / Visits authorized: 1/1    FOTO: 45  FOTO 1st Follow-up: NA  FOTO 2nd Follow-up: NA    Time In: 1100  Time Out: 1205  Total Billable Time: 65 minutes    Precautions: Standard    Subjective     Date of onset: 05/01/2023  History of current condition - Radha reports: progressive onset of gradual B (L>R) shoulder pain.  No true CLAUDIO; just feels like pain and weakness have slowly progressed to the point where she started to notice it limited her ADLs. Was always underlying, but manageable, and now wants to address the pain and weakness moving forward.  Denies any N/T  Used to enjoy riding bikes and wants to get back into the gym  Denies any N/T  R-hand dominant  Issues with carrying and lifting objects; prolonged use of BUE's in OH range  Stiffness in neck with B rotation     Imaging: X-ray R shoulder  FINDINGS:  Shoulder trauma three views right: There is mild DJD.  No fracture dislocation bone destruction seen.    Prior Therapy: None  Social History: Lives with family; retired  Occupation: retired  Prior Level of Function: No issues  Current Level of Function: Issues with carrying and lifting    Pain:  Current 5/10, worst 8/10, best 2/10   Location: bilateral shoulders  Description: Aching, Dull, Throbbing, and Sharp  Aggravating Factors: Flexing and Lifting  Easing Factors: rest    Pts Goals: Able to tolerate prolonged OH UE use    Medical History:   Past Medical History:   Diagnosis  Date    Asymptomatic cholelithiasis     Hypothyroidism     Lichen planus     Scoliosis     Unspecified essential hypertension 3/25/2014       Surgical History:   Radha Fraga  has a past surgical history that includes Tubal ligation; Tonsillectomy; Colonoscopy (2006); Nose surgery; and Colonoscopy (N/A, 3/23/2018).    Medications:   Radha has a current medication list which includes the following prescription(s): atorvastatin, ipratropium, levothyroxine, losartan, and methocarbamol.    Allergies:   Review of patient's allergies indicates:   Allergen Reactions    Iodinated contrast media         Objective     Observation: Mild increased t-sp kyphosis    Posture: c-sp held in slight R lateral flexion    Passive Range of Motion:   Shoulder Right Left   Flexion 165 165   Abduction 160 160   ER at 0 45 20   ER at 90 40 15   IR full full   * = pain      Strength:  Shoulder Right Left   Flexion 4+/5 4+/5   Abduction 4/5 4/5   ER 3+/5 3+/5   IR 5/5 5/5   Serratus Anterior 3+/5 3+/5   Middle Trap 3+/5 3+/5   Low Trap 3+/5 3+/5     Rotator Cuff Tear   Right Left   Hawkin's Serg + +   Drop Arm test - -   Resisted ER - -     Instability:   Right Left   Anterior Apprehension Test - -   Relocation test - -   Posterior Apprehension Test - -   Sulcus Sign - -     Glenohumeral Joint Mobility:  Glide  Right Left   Posterior hypo hypo   Inferior normal normal   Anterior hyper hyper   Grades of Movement 0-VI (0- fused/ankylosis, I- considerable decrease, II-slight decrease, III-normal, IV-slight increase, V- considerable increase, VI- complete instability)    Palpation: TTP R/L posterolateral acromion    Intake Outcome Measure for FOTO Shoulder Survey    Therapist reviewed FOTO scores for Radha Fraga on 7/17/2023.   FOTO documents entered into EPIC - see Media section.    Intake Score: 45%     Treatment     Treatment Time In: 1100  Treatment Time Out: 1205  Total Treatment time separate from Evaluation: 65 minutes    Radha  received the treatments listed below:      Therapeutic exercises to develop strength, endurance, ROM, posture, and core stabilization for 15 minutes including:  Cat Camel  Open Books  Table T-sp Flexion    Manual therapy techniques: Joint mobilizations, Manual traction, and Soft tissue Mobilization were applied to the: c-sp, B shoulder, and t-sp for 15 minutes, including:  Upper t-sp P/A Mobs  C-sp distraction and med/lat mobs  B shoulder GHJ A/P Mobs    Neuromuscular re-education activities to improve: Balance, Coordination, Kinesthetic, Proprioception, and Posture for 15 minutes. The following activities were included:  Rows  IR/ER RTB/GTB  No_money     Therapeutic activities to improve functional performance for 0 minutes, including:    Home Exercises and Patient Education Provided     Education provided:   - t-sp mobility and cuff strengthening  - Prognosis, activity modification, goals for therapy, role of therapy for care, exercises/HEP    Written Home Exercises Provided:  Exercises were reviewed and Radha was able to demonstrate them prior to the end of the session.   Pt received a written copy of exercises to perform at home. Radha demonstrated good understanding of the education provided.     See EMR under patient instructions for exercises given.     Assessment     Radha is a 67 y.o. female referred to outpatient Physical Therapy with B RC weakness and stiff upper back. No suspected RC tears nor labral issues; needs mobility in t-sp and CTJ. Will address weakness in periscapular musculature and humeral head control with overhead ADL performance.    Pt will benefit from skilled outpatient Physical Therapy to address the deficits stated above and in the chart below, provide pt/family education, and to maximize pt's level of independence. Pt prognosis is Good.     Plan of care discussed with patient: Yes  Pt's spiritual, cultural and educational needs considered and patient is agreeable to the plan of care  and goals as stated below:     Anticipated Barriers for therapy: Age    Medical Necessity is demonstrated by the following:    History  Co-morbidities and personal factors that may impact the plan of care [x] LOW: no personal factors / co-morbidities  [] MODERATE: 1-2 personal factors / co-morbidities  [] HIGH: 3+ personal factors / co-morbidities    Moderate / High Support Documentation:   Co-morbidities affecting plan of care: none    Personal Factors:   No deficits     Examination  Body Structures and Functions, activity limitations and participation restrictions that may impact the plan of care [x] LOW: addressing 1-2 elements  [] MODERATE: 3+ elements  [] HIGH: 4+ elements (please support below)    Moderate / High Support Documentation: none     Clinical Presentation [x] LOW: stable  [] MODERATE: Evolving  [] HIGH: Unstable     Decision Making/ Complexity Score: low       GOALS   Short Term Goals: 4 weeks  Pt will report decreased B shoulder pain  < / =  3/10  to increase tolerance for ADL performance and recreational routine.  Pt will improve B shoulder ROM to WFL in order to be able to perform ADLs without difficulty.  Pt will improve B RC strength by 1/3 MMT grade to increase tolerance for ADL and work activities  Pt will demonstrate tolerance to HEP to improve independence with ADL's    Long Term Goals: 8 weeks  Pt will report decreased B shoulder pain  < / =  1/10  to increase tolerance for ADL performance and recreational routine  Pt will improve B shoulder ROM to WNL in order to be able to perform ADLs without difficulty  Pt will improve B RC strength by 1/3 MMT grade to increase tolerance for ADL and work activities  Pt will report at CJ level (20-40% impaired) on FOTO Shoulder to demonstrate increase in LE function with every day tasks.     Plan   Plan of care Certification: 7/17/2023 to 10/17/2023.    Outpatient Physical Therapy 1-2 times weekly for 12 weeks to include the following interventions:  Gait Training, Manual Therapy, Moist Heat/ Ice, Neuromuscular Re-ed, Patient Education, Therapeutic Activites, Therapeutic Exercise, and Functional Dry Needling with/or without Electrical Stimulation as needed.     Doming[facility], PT , DPT, OCS  Board Certified in Orthopedic Physical Therapy

## 2023-07-24 ENCOUNTER — CLINICAL SUPPORT (OUTPATIENT)
Dept: ENDOSCOPY | Facility: HOSPITAL | Age: 68
End: 2023-07-24
Attending: INTERNAL MEDICINE
Payer: MEDICARE

## 2023-07-24 DIAGNOSIS — D12.6 TUBULAR ADENOMA OF COLON: ICD-10-CM

## 2023-07-24 RX ORDER — SODIUM, POTASSIUM,MAG SULFATES 17.5-3.13G
1 SOLUTION, RECONSTITUTED, ORAL ORAL DAILY
Qty: 1 KIT | Refills: 0 | Status: SHIPPED | OUTPATIENT
Start: 2023-07-24 | End: 2023-07-26

## 2023-07-24 NOTE — PLAN OF CARE
Spoke to pt to schedule procedure(s) Colonoscopy       Physician to perform procedure(s) Dr. RUBY Paredes  Date of Procedure (s) 10/12/23  Arrival Time 8:30 AM  Time of Procedure(s) 9:30 AM   Location of Procedure(s) [address]  Type of Rx Prep sent to patient: Suprep  Instructions provided to patient via [facility]    Patient was informed on the following information and verbalized understanding. Screening questionnaire reviewed with patient and complete. If procedure requires anesthesia, a responsible adult needs to be present to accompany the patient home, patient cannot drive after receiving anesthesia. Appointment details are tentative, especially check-in time. Patient will receive a prep-op call 4 days prior to confirm check-in time for procedure. If applicable the patient should contact their pharmacy to verify Rx for procedure prep is ready for pick-up. Patient was advised to call the scheduling department at [phone] if pharmacy states no Rx is available. Patient was advised to call the endoscopy scheduling department if any questions or concerns arise.      [facility] Scheduling Department

## 2023-07-26 ENCOUNTER — LAB VISIT (OUTPATIENT)
Dept: LAB | Facility: HOSPITAL | Age: 68
End: 2023-07-26
Attending: INTERNAL MEDICINE
Payer: MEDICARE

## 2023-07-26 ENCOUNTER — CLINICAL SUPPORT (OUTPATIENT)
Dept: REHABILITATION | Facility: HOSPITAL | Age: 68
End: 2023-07-26
Payer: MEDICARE

## 2023-07-26 DIAGNOSIS — E06.3 HYPOTHYROIDISM DUE TO HASHIMOTO'S THYROIDITIS: Chronic | ICD-10-CM

## 2023-07-26 DIAGNOSIS — E78.5 DYSLIPIDEMIA: ICD-10-CM

## 2023-07-26 DIAGNOSIS — I10 ESSENTIAL HYPERTENSION: ICD-10-CM

## 2023-07-26 DIAGNOSIS — R79.9 ABNORMAL FINDING OF BLOOD CHEMISTRY: Primary | ICD-10-CM

## 2023-07-26 DIAGNOSIS — Z00.00 NORMAL PHYSICAL EXAM: ICD-10-CM

## 2023-07-26 DIAGNOSIS — E03.8 HYPOTHYROIDISM DUE TO HASHIMOTO'S THYROIDITIS: Chronic | ICD-10-CM

## 2023-07-26 DIAGNOSIS — M25.511 ACUTE PAIN OF RIGHT SHOULDER: Primary | ICD-10-CM

## 2023-07-26 LAB
ALBUMIN SERPL BCP-MCNC: 3.9 G/DL (ref 3.5–5.2)
ALP SERPL-CCNC: 70 U/L (ref 55–135)
ALT SERPL W/O P-5'-P-CCNC: 16 U/L (ref 10–44)
ANION GAP SERPL CALC-SCNC: 10 MMOL/L (ref 8–16)
AST SERPL-CCNC: 21 U/L (ref 10–40)
BASOPHILS # BLD AUTO: 0.02 K/UL (ref 0–0.2)
BASOPHILS NFR BLD: 0.4 % (ref 0–1.9)
BILIRUB SERPL-MCNC: 0.7 MG/DL (ref 0.1–1)
BUN SERPL-MCNC: 17 MG/DL (ref 8–23)
CALCIUM SERPL-MCNC: 9.9 MG/DL (ref 8.7–10.5)
CHLORIDE SERPL-SCNC: 106 MMOL/L (ref 95–110)
CHOLEST SERPL-MCNC: 140 MG/DL (ref 120–199)
CHOLEST/HDLC SERPL: 2.1 {RATIO} (ref 2–5)
CO2 SERPL-SCNC: 27 MMOL/L (ref 23–29)
CREAT SERPL-MCNC: 0.9 MG/DL (ref 0.5–1.4)
DIFFERENTIAL METHOD: ABNORMAL
EOSINOPHIL # BLD AUTO: 0.1 K/UL (ref 0–0.5)
EOSINOPHIL NFR BLD: 1.9 % (ref 0–8)
ERYTHROCYTE [DISTWIDTH] IN BLOOD BY AUTOMATED COUNT: 12.9 % (ref 11.5–14.5)
EST. GFR  (NO RACE VARIABLE): >60 ML/MIN/1.73 M^2
GLUCOSE SERPL-MCNC: 87 MG/DL (ref 70–110)
HCT VFR BLD AUTO: 42.5 % (ref 37–48.5)
HDLC SERPL-MCNC: 67 MG/DL (ref 40–75)
HDLC SERPL: 47.9 % (ref 20–50)
HGB BLD-MCNC: 13.5 G/DL (ref 12–16)
IMM GRANULOCYTES # BLD AUTO: 0.01 K/UL (ref 0–0.04)
IMM GRANULOCYTES NFR BLD AUTO: 0.2 % (ref 0–0.5)
LDLC SERPL CALC-MCNC: 63.2 MG/DL (ref 63–159)
LYMPHOCYTES # BLD AUTO: 2 K/UL (ref 1–4.8)
LYMPHOCYTES NFR BLD: 41.6 % (ref 18–48)
MCH RBC QN AUTO: 31.2 PG (ref 27–31)
MCHC RBC AUTO-ENTMCNC: 31.8 G/DL (ref 32–36)
MCV RBC AUTO: 98 FL (ref 82–98)
MONOCYTES # BLD AUTO: 0.4 K/UL (ref 0.3–1)
MONOCYTES NFR BLD: 8 % (ref 4–15)
NEUTROPHILS # BLD AUTO: 2.3 K/UL (ref 1.8–7.7)
NEUTROPHILS NFR BLD: 47.9 % (ref 38–73)
NONHDLC SERPL-MCNC: 73 MG/DL
NRBC BLD-RTO: 0 /100 WBC
PLATELET # BLD AUTO: 222 K/UL (ref 150–450)
PMV BLD AUTO: 10.4 FL (ref 9.2–12.9)
POTASSIUM SERPL-SCNC: 4.2 MMOL/L (ref 3.5–5.1)
PROT SERPL-MCNC: 6.7 G/DL (ref 6–8.4)
RBC # BLD AUTO: 4.33 M/UL (ref 4–5.4)
SODIUM SERPL-SCNC: 143 MMOL/L (ref 136–145)
T4 FREE SERPL-MCNC: 0.91 NG/DL (ref 0.71–1.51)
TRIGL SERPL-MCNC: 49 MG/DL (ref 30–150)
TSH SERPL DL<=0.005 MIU/L-ACNC: 5.59 UIU/ML (ref 0.4–4)
WBC # BLD AUTO: 4.73 K/UL (ref 3.9–12.7)

## 2023-07-26 PROCEDURE — 84439 ASSAY OF FREE THYROXINE: CPT | Performed by: INTERNAL MEDICINE

## 2023-07-26 PROCEDURE — 84443 ASSAY THYROID STIM HORMONE: CPT | Performed by: INTERNAL MEDICINE

## 2023-07-26 PROCEDURE — 36415 COLL VENOUS BLD VENIPUNCTURE: CPT | Mod: PO | Performed by: INTERNAL MEDICINE

## 2023-07-26 PROCEDURE — 80061 LIPID PANEL: CPT | Performed by: INTERNAL MEDICINE

## 2023-07-26 PROCEDURE — 97112 NEUROMUSCULAR REEDUCATION: CPT

## 2023-07-26 PROCEDURE — 80053 COMPREHEN METABOLIC PANEL: CPT | Performed by: INTERNAL MEDICINE

## 2023-07-26 PROCEDURE — 85025 COMPLETE CBC W/AUTO DIFF WBC: CPT | Performed by: INTERNAL MEDICINE

## 2023-07-26 NOTE — PROGRESS NOTES
CBC, CMP WNL  Lipid good on new statin  TSH high on LT. Missing doses?   Results to pt. No changes. If TSH persists next check may need to increase

## 2023-07-26 NOTE — PROGRESS NOTES
Physical Therapy Daily Treatment Note     Name: Radha Fraga  Clinic Number: [id number]    Therapy Diagnosis:   Encounter Diagnosis   Name Primary?    Acute pain of right shoulder Yes     Physician: Bev Valenzuela MD    Visit Date: 7/26/2023    Physician Orders: PT Eval and Treat  Medical Diagnosis from Referral: M25.511 (ICD-10-CM) - Acute pain of right shoulder; M54.2 (ICD-10-CM) - Neck pain  Evaluation Date: 7/17/2023  Authorization Period Expiration: 12/31/2023  Plan of Care Expiration: 10/17/2023   Visit # / Visits authorized: 2/20     FOTO: 45  FOTO 1st Follow-up: NA  FOTO 2nd Follow-up: NA     Time In: 0900  Time Out: 1000  Total Billable Time: 60 minutes     Precautions: Standard    Subjective     Pt reports similar sx behavior since last session; perhaps more upper back mobility.  She was compliant with home exercise program.  Response to previous treatment: Good  Functional change: improved thoracic    Pain: 3/10  Location: left shoulder      Objective     Passive Range of Motion:   Shoulder Right Left   Flexion 165 165   Abduction 160 160   ER at 0 45 20   ER at 90 40 15   IR full full   * = pain         Treatment     Radha received the treatments listed below:       Therapeutic exercises to develop strength, endurance, ROM, posture, and core stabilization for 15 minutes including:  UBE completed for 8' to increase ROM, endurance, and decrease pain to improve tolerance to ADLs and age-related activities  Cat Camel  Open Books  Table T-sp Flexion     Manual therapy techniques: Joint mobilizations, Manual traction, and Soft tissue Mobilization were applied to the: c-sp, B shoulder, and t-sp for 0 minutes, including:  Upper t-sp P/A Mobs  C-sp distraction and med/lat mobs  B shoulder GHJ A/P Mobs     Neuromuscular re-education activities to improve: Balance, Coordination, Kinesthetic, Proprioception, and Posture for 30 minutes. The following activities were included:  Wall Walks/Clocks YTB  Rows 45  degree  IR/ER RTB/GTB  Slot Machines 3# 3 x 15    NP:  No_money      Therapeutic activities to improve functional performance for 0 minutes, including:     Home Exercises and Patient Education Provided      Education provided:   - t-sp mobility and cuff strengthening  - Prognosis, activity modification, goals for therapy, role of therapy for care, exercises/HEP     Written Home Exercises Provided:  Exercises were reviewed and Radha was able to demonstrate them prior to the end of the session.   Pt received a written copy of exercises to perform at home. Radha demonstrated good understanding of the education provided.      See EMR under patient instructions for exercises given.     Assessment     B RC weakness and stiff upper back  First return visit since original session. Able to tolerate more active loading of the RC without impingement nor default into an anterior position of GHJ. Good scap control; will progress moving forward.  Radha Is progressing well towards her goals.   Pt prognosis is Good.     Pt will continue to benefit from skilled outpatient physical therapy to address the deficits listed in the problem list box on initial evaluation, provide pt/family education and to maximize pt's level of independence in the home and community environment.     Pt's spiritual, cultural and educational needs considered and pt agreeable to plan of care and goals.    Anticipated barriers to physical therapy: Age    GOALS   Short Term Goals: 4 weeks  Pt will report decreased B shoulder pain  < / =  3/10  to increase tolerance for ADL performance and recreational routine.  Pt will improve B shoulder ROM to WFL in order to be able to perform ADLs without difficulty.  Pt will improve B RC strength by 1/3 MMT grade to increase tolerance for ADL and work activities  Pt will demonstrate tolerance to HEP to improve independence with ADL's     Long Term Goals: 8 weeks  Pt will report decreased B shoulder pain  < / =  1/10  to  increase tolerance for ADL performance and recreational routine  Pt will improve B shoulder ROM to WNL in order to be able to perform ADLs without difficulty  Pt will improve B RC strength by 1/3 MMT grade to increase tolerance for ADL and work activities  Pt will report at CJ level (20-40% impaired) on FOTO Shoulder to demonstrate increase in LE function with every day tasks.     Plan     Continue per ANJEL Heart, PT, DPT  Board Certified in Orthopedic Physical Therapy

## 2023-08-02 ENCOUNTER — CLINICAL SUPPORT (OUTPATIENT)
Dept: REHABILITATION | Facility: HOSPITAL | Age: 68
End: 2023-08-02
Payer: MEDICARE

## 2023-08-02 DIAGNOSIS — M25.511 ACUTE PAIN OF RIGHT SHOULDER: Primary | ICD-10-CM

## 2023-08-02 PROCEDURE — 97140 MANUAL THERAPY 1/> REGIONS: CPT

## 2023-08-02 PROCEDURE — 97110 THERAPEUTIC EXERCISES: CPT

## 2023-08-02 PROCEDURE — 97112 NEUROMUSCULAR REEDUCATION: CPT

## 2023-08-02 NOTE — PROGRESS NOTES
Physical Therapy Daily Treatment Note     Name: Radha Fraga  Clinic Number: [id number]    Therapy Diagnosis:   Encounter Diagnosis   Name Primary?    Acute pain of right shoulder Yes     Physician: Bev Valenzuela MD    Visit Date: 8/2/2023    Physician Orders: PT Eval and Treat  Medical Diagnosis from Referral: M25.511 (ICD-10-CM) - Acute pain of right shoulder; M54.2 (ICD-10-CM) - Neck pain  Evaluation Date: 7/17/2023  Authorization Period Expiration: 12/31/2023  Plan of Care Expiration: 10/17/2023   Visit # / Visits authorized: 2/20     FOTO: 45  FOTO 1st Follow-up: NA  FOTO 2nd Follow-up: NA     Time In: 0900  Time Out: 1000  Total Billable Time: 60 minutes     Precautions: Standard    Subjective     Pt reports some soreness in the outside part of L shoulder, but 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  She was compliant with home exercise program.  Response to previous treatment: Good  Functional change: improved thoracic    Pain: 3/10  Location: left shoulder      Objective     Passive Range of Motion:   Shoulder Right Left   Flexion 165 165   Abduction 160 160   ER at 0 45 20   ER at 90 40 15   IR full full   * = pain         Treatment     Radha received the treatments listed below:       Therapeutic exercises to develop strength, endurance, ROM, posture, and core stabilization for 15 minutes including:  UBE completed for 8' to increase ROM, endurance, and decrease pain to improve tolerance to ADLs and age-related activities  Cat Camel  Open Books  Table T-sp Flexion     Manual therapy techniques: Joint mobilizations, Manual traction, and Soft tissue Mobilization were applied to the: c-sp, B shoulder, and t-sp for 15 minutes, including:  Upper t-sp P/A Mobs  C-sp distraction and med/lat mobs  B shoulder GHJ A/P Mobs     Neuromuscular re-education activities to improve: Balance, Coordination, Kinesthetic, Proprioception, and Posture for 30 minutes. The following activities were included:  Prone T,  Y, W  Serratus pushes on table    Wall Walks/Clocks YTB  Rows 45 degree  IR/ER RTB/GTB  Slot Machines 3# 3 x 15    NP:  No_money      Therapeutic activities to improve functional performance for 0 minutes, including:     Home Exercises and Patient Education Provided      Education provided:   - t-sp mobility and cuff strengthening  - Prognosis, activity modification, goals for therapy, role of therapy for care, exercises/HEP     Written Home Exercises Provided:  Exercises were reviewed and Radha was able to demonstrate them prior to the end of the session.   Pt received a written copy of exercises to perform at home. Radha demonstrated good understanding of the education provided.      See EMR under patient instructions for exercises given.     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 Worked in more isolated strengthening for periscapular musculature with anti-gravity positioning. No sx limitations during exercise performance.  Radha Is progressing well towards her goals.   Pt prognosis is Good.     Pt will continue to benefit from skilled outpatient physical therapy to address the deficits listed in the problem list box on initial evaluation, provide pt/family education and to maximize pt's level of independence in the home and community environment.     Pt's spiritual, cultural and educational needs considered and pt agreeable to plan of care and goals.    Anticipated barriers to physical therapy: Age    GOALS   Short Term Goals: 4 weeks  Pt will report decreased B shoulder pain  < / =  3/10  to increase tolerance for ADL performance and recreational routine.  Pt will improve B shoulder ROM to WFL in order to be able to perform ADLs without difficulty.  Pt will improve B RC strength by 1/3 MMT grade to increase tolerance for ADL and work activities  Pt will demonstrate tolerance to HEP to  improve independence with ADL's     Long Term Goals: 8 weeks  Pt will report decreased B shoulder pain  < / =  1/10  to increase tolerance for ADL performance and recreational routine  Pt will improve B shoulder ROM to WNL in order to be able to perform ADLs without difficulty  Pt will improve B RC strength by 1/3 MMT grade to increase tolerance for ADL and work activities  Pt will report at CJ level (20-40% impaired) on FOTO Shoulder to demonstrate increase in LE function with every day tasks.     Plan     Continue per ANJEL Heart, PT, DPT  Board Certified in Orthopedic Physical Therapy

## 2023-08-24 NOTE — PROGRESS NOTES
This note was created by combination of typed  and M-Modal dictation.  Transcription errors may be present.  If there are any questions, please contact me.    Assessment and Plan:   Assessment and Plan    Traveler's diarrhea  -acute with Zithromax 1 g 1 dose.  If persisting, would re-dose it at 500 mg daily for 3 days.  And if still persisting, Cipro  -     azithromycin (ZITHROMAX) 1 gram Pack; Take 1 g by mouth once. for 1 dose  Dispense: 1 packet; Refill: 0      Medications Discontinued During This Encounter   Medication Reason    ipratropium (ATROVENT) 42 mcg (0.06 %) nasal spray        meds sent this encounter:  Medications Ordered This Encounter   Medications    azithromycin (ZITHROMAX) 1 gram Pack     Sig: Take 1 g by mouth once. for 1 dose     Dispense:  1 packet     Refill:  0         Follow Up:   Future Appointments   Date Time Provider Department Center   2023  1:00 PM Chicho Meléndez MD [facility] MED [place]   2023  9:00 AM Doming[facility] Heart, PT [facility] [place]   2023  9:00 AM [facility], PT [facility] [place]   2023  9:30 AM Josue Hurtado MD [address]   2023  9:00 AM [facility], PT [facility] [place]   2023  9:00 AM [facility], PT [facility] [place]   2024  8:00 AM [facility], [facility] [place]   2024 10:20 AM Chicho Meléndez MD [facility] [place]         Subjective:   Subjective   Chief Complaint   Patient presents with    Diarrhea       HPI  Radha is a 68 y.o. female.    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 Patient is postmenopausal.    The chief complaint leading to consultation is: diarrhea  Visit type: Virtual visit with synchronous audio and video  Total time spent with patient: 15 minutes  Each patient to whom he or she provides medical services by telemedicine is:  (1) informed of the relationship between the physician and patient and the respective role of any other health care provider with respect to management of the patient; and (2) notified that he or she may decline to receive medical services by telemedicine and may withdraw from such care at any time.    Notes:  Last appointment with this clinic was 3/21/2023. Last visit with me 3/21/2023   To summarize last visit and events leading up to today:  Hypertension needs to monitor with digital monitoring  Hyperlipidemia 01/2023 started on statin   Hypothyroid on levothyroxine  Migraines  History of TA  Chronic back pain  Osteopenia DEXA scan 2022 7/26 labs  CBC, CMP WNL  Lipid good on new statin  TSH high on LT. Missing doses?     Today's visit:  Please note: Chronic medical problems that are stable but are being addressed at this visit may not be listed/documented here, but may be addressed in more detail in the Assessment and Plan section.   Below are salient features of chronic medical conditions, or new/acute conditions and their details.    Went on a trip to [place] last week, the last day of her trip she developed diarrhea.  It was pretty severe and she took Imodium which got her through the airplane trip back.    But since then she has been having diarrhea about twice a day.  Very watery.  Has been taking Pepto-Bismol and Imodium.  Some abdominal pain/cramping, more like cramping.  No blood in the stool.  No mucus.    Her son who also went, also got symptoms but he was treated with Zithromax and he had improvement.    Answers submitted by the patient for this visit:  Review of Systems Questionnaire (Submitted on  8/24/2023)  activity change: No  unexpected weight change: No  neck pain: No  hearing loss: No  rhinorrhea: No  trouble swallowing: No  eye discharge: No  visual disturbance: No  chest tightness: No  wheezing: No  chest pain: No  palpitations: No  blood in stool: No  constipation: No  vomiting: No  diarrhea: Yes  polydipsia: No  polyuria: No  difficulty urinating: No  hematuria: No  menstrual problem: No  dysuria: No  joint swelling: No  arthralgias: No  headaches: No  weakness: No  confusion: No  dysphoric mood: No      Patient Care Team:  Chicho Meléndez MD as PCP - General (Internal Medicine)  Edgard Multani II, MD as Consulting Physician (Endocrinology)  Tatianna Latham MD as Consulting Physician (Physical Medicine and Rehabilitation)  Josh Daily MD (Inactive) as Obstetrician (Obstetrics)  Noé Joyce MA as Care Coordinator  Marissa Warner (Inactive) as Digital Medicine Health   Olive Joe PharmD as Hypertension Digital Medicine Clinician  Chicho Meléndez MD as Hypertension Digital Medicine Responsible Provider (Internal Medicine)      Patient Active Problem List    Diagnosis Date Noted    Acute pain of right shoulder 07/17/2023    Dyslipidemia 01/26/2023    Decreased ROM of trunk and back 09/01/2022    Decreased strength of trunk and back 09/01/2022    Osteopenia of multiple sites 02/10/2022     02/08/2022 DEXA scan L-spine-1.4, total hip-0.2, femoral neck 0.1.  FRAX score 0.5/6.9%.  Osteopenia.  Compared to previous, 5% decline total hip.      Palpitations 02/03/2021    COVID-19 virus detected 02/03/2021    Chronic midline low back pain without sciatica 08/10/2016     8/10/2016 MRI L spine: 1. Significant degenerative disc disease at L5-S1 resulting in mild bilateral neuroforaminal narrowing.  Otherwise mild multilevel lumbar spondylosis. 2. Cholelithiasis.  MRI of T spine showed: 1. Mild degenerative changes of the thoracic spine. No spinal canal stenosis or  neuroforaminal narrowing.  Spinal cord signal and morphology are maintained.2.  Cholelithiasis. 3.  Subcentimeter T2 hyperintense lesion at the liver dome incompletely characterized.      Hypothyroidism due to Hashimoto's thyroiditis 06/16/2015    Migraine syndrome 05/19/2015     Symptoms - tightness in the back of the neck and scotomas - really more opthalmic migraine.  5/18/2015 MRI brain neg  5/18/2015 carotid US negative.      Essential hypertension 03/25/2014     2/10/2021 TTE LV normal size with eccentric hypertrophy and normal systolic function LVEF 60%.  Indeterminate diastolic function.  Normal RV size and systolic function.  No pulmonary hypertension.        Tubular adenoma of colon 2013 and 2018 03/25/2014 1/11/2013 colonoscopy with sigmoid hyperplastic polyp with suggestion of focal early adenomatous change; melanosis coli.  3/23/2018 colonoscopy rectal sessile tubular adenoma      Alopecia areata 03/25/2014    Asymptomatic cholelithiasis 10/13/2012    Lichen planus 10/13/2012    Scoliosis 10/13/2012       PAST MEDICAL PROBLEMS, PAST SURGICAL HISTORY: please see relevant portions of the electronic medical record    ALLERGIES AND MEDICATIONS: updated and reviewed.  Medication List with Changes/Refills   Current Medications    ATORVASTATIN (LIPITOR) 10 MG TABLET    Take 1 tablet (10 mg total) by mouth once daily.    IPRATROPIUM (ATROVENT) 42 MCG (0.06 %) NASAL SPRAY    2 sprays by Each Nostril route 3 (three) times daily. AS NEEDED FOR NASAL CONGESTION AND DRIP    LEVOTHYROXINE (SYNTHROID) 75 MCG TABLET    TAKE 1 TABLET BY MOUTH BEFORE BREAKFAST.    LOSARTAN (COZAAR) 25 MG TABLET    TAKE 1 TABLET BY MOUTH EVERY DAY    METHOCARBAMOL (ROBAXIN) 500 MG TAB    Take 1 tablet (500 mg total) by mouth nightly as needed (muscle spasm). May cause sedation         Objective:   Objective   Physical Exam   There were no vitals filed for this visit. There is no height or weight on file to calculate BMI.             Physical Exam  Constitutional:       General: She is not in acute distress.     Appearance: Normal appearance. She is not ill-appearing.   HENT:      Head: Normocephalic.   Pulmonary:      Effort: Pulmonary effort is normal.   Neurological:      General: No focal deficit present.      Mental Status: She is alert.   Psychiatric:         Mood and Affect: Mood normal.         Behavior: Behavior normal.         Thought Content: Thought content normal.         Judgment: Judgment normal.

## 2023-08-25 ENCOUNTER — OFFICE VISIT (OUTPATIENT)
Dept: FAMILY MEDICINE | Facility: CLINIC | Age: 68
End: 2023-08-25
Payer: MEDICARE

## 2023-08-25 DIAGNOSIS — A09 TRAVELER'S DIARRHEA: Primary | ICD-10-CM

## 2023-08-25 PROCEDURE — 4010F ACE/ARB THERAPY RXD/TAKEN: CPT | Mod: CPTII,95,, | Performed by: INTERNAL MEDICINE

## 2023-08-25 PROCEDURE — 1160F PR REVIEW ALL MEDS BY PRESCRIBER/CLIN PHARMACIST DOCUMENTED: ICD-10-PCS | Mod: CPTII,95,, | Performed by: INTERNAL MEDICINE

## 2023-08-25 PROCEDURE — 4010F PR ACE/ARB THEARPY RXD/TAKEN: ICD-10-PCS | Mod: CPTII,95,, | Performed by: INTERNAL MEDICINE

## 2023-08-25 PROCEDURE — 1159F MED LIST DOCD IN RCRD: CPT | Mod: CPTII,95,, | Performed by: INTERNAL MEDICINE

## 2023-08-25 PROCEDURE — 99213 OFFICE O/P EST LOW 20 MIN: CPT | Mod: 95,,, | Performed by: INTERNAL MEDICINE

## 2023-08-25 PROCEDURE — 1159F PR MEDICATION LIST DOCUMENTED IN MEDICAL RECORD: ICD-10-PCS | Mod: CPTII,95,, | Performed by: INTERNAL MEDICINE

## 2023-08-25 PROCEDURE — 1160F RVW MEDS BY RX/DR IN RCRD: CPT | Mod: CPTII,95,, | Performed by: INTERNAL MEDICINE

## 2023-08-25 PROCEDURE — 99213 PR OFFICE/OUTPT VISIT, EST, LEVL III, 20-29 MIN: ICD-10-PCS | Mod: 95,,, | Performed by: INTERNAL MEDICINE

## 2023-08-25 RX ORDER — AZITHROMYCIN 1 G/1
1 POWDER, FOR SUSPENSION ORAL ONCE
Qty: 1 PACKET | Refills: 0 | Status: SHIPPED | OUTPATIENT
Start: 2023-08-25 | End: 2023-08-25

## 2023-08-30 ENCOUNTER — CLINICAL SUPPORT (OUTPATIENT)
Dept: REHABILITATION | Facility: HOSPITAL | Age: 68
End: 2023-08-30
Payer: MEDICARE

## 2023-08-30 DIAGNOSIS — M25.511 ACUTE PAIN OF RIGHT SHOULDER: Primary | ICD-10-CM

## 2023-08-30 PROCEDURE — 97110 THERAPEUTIC EXERCISES: CPT

## 2023-08-30 PROCEDURE — 97140 MANUAL THERAPY 1/> REGIONS: CPT

## 2023-08-30 PROCEDURE — 97112 NEUROMUSCULAR REEDUCATION: CPT

## 2023-08-30 NOTE — PROGRESS NOTES
Physical Therapy Daily Treatment Note     Name: Radha Fraga  Clinic Number: [id number]    Therapy Diagnosis:   Encounter Diagnosis   Name Primary?    Acute pain of right shoulder Yes     Physician: Bev Valenzuela MD    Visit Date: 8/30/2023    Physician Orders: PT Eval and Treat  Medical Diagnosis from Referral: M25.511 (ICD-10-CM) - Acute pain of right shoulder; M54.2 (ICD-10-CM) - Neck pain  Evaluation Date: 7/17/2023  Authorization Period Expiration: 12/31/2023  Plan of Care Expiration: 10/17/2023   Visit # / Visits authorized: 3/20     FOTO: 45  FOTO 1st Follow-up: NA  FOTO 2nd Follow-up: NA     Time In: 0900  Time Out: 1000  Total Billable Time: 60 minutes     Precautions: Standard    Subjective     Pt reports improved shoulder sx behavior since last session; less tightness.  She was compliant with home exercise program.  Response to previous treatment: Good  Functional change: improved thoracic    Pain: 3/10  Location: left shoulder      Objective     Passive Range of Motion:   Shoulder Right Left   Flexion 165 165   Abduction 160 160   ER at 0 45 20   ER at 90 40 15   IR full full   * = pain         Treatment     Radha received the treatments listed below:       Therapeutic exercises to develop strength, endurance, ROM, posture, and core stabilization for 15 minutes including:  UBE completed for 8' to increase ROM, endurance, and decrease pain to improve tolerance to ADLs and age-related activities  Cat Camel  Open Books  Table T-sp Flexion     Manual therapy techniques: Joint mobilizations, Manual traction, and Soft tissue Mobilization were applied to the: c-sp, B shoulder, and t-sp for 15 minutes, including:  Upper t-sp P/A Mobs  C-sp distraction and med/lat mobs  B shoulder GHJ A/P Mobs     Neuromuscular re-education activities to improve: Balance, Coordination, Kinesthetic, Proprioception, and Posture for 30 minutes. The following activities were included:  Prone T, Y, W  Serratus pushes on  table    Wall Walks/Clocks YTB  Rows 45 degree  IR/ER RTB/GTB  Slot Machines 3# 3 x 15    NP:  No_money      Therapeutic activities to improve functional performance for 0 minutes, including:     Home Exercises and Patient Education Provided      Education provided:   - t-sp mobility and cuff strengthening  - Prognosis, activity modification, goals for therapy, role of therapy for care, exercises/HEP     Written Home Exercises Provided:  Exercises were reviewed and Radha was able to demonstrate them prior to the end of the session.   Pt received a written copy of exercises to perform at home. Radha demonstrated good understanding of the education provided.      See EMR under patient instructions for exercises given.     Assessment     B RC weakness and stiff upper back  First return visit in a few weeks. 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 Continued to work on upper back stability training across a variety of demands.  Radha Is progressing well towards her goals.   Pt prognosis is Good.     Pt will continue to benefit from skilled outpatient physical therapy to address the deficits listed in the problem list box on initial evaluation, provide pt/family education and to maximize pt's level of independence in the home and community environment.     Pt's spiritual, cultural and educational needs considered and pt agreeable to plan of care and goals.    Anticipated barriers to physical therapy: Age    GOALS   Short Term Goals: 4 weeks  Pt will report decreased B shoulder pain  < / =  3/10  to increase tolerance for ADL performance and recreational routine.  Pt will improve B shoulder ROM to WFL in order to be able to perform ADLs without difficulty.  Pt will improve B RC strength by 1/3 MMT grade to increase tolerance for ADL and work activities  Pt will demonstrate tolerance to HEP to improve independence with  ADL's     Long Term Goals: 8 weeks  Pt will report decreased B shoulder pain  < / =  1/10  to increase tolerance for ADL performance and recreational routine  Pt will improve B shoulder ROM to WNL in order to be able to perform ADLs without difficulty  Pt will improve B RC strength by 1/3 MMT grade to increase tolerance for ADL and work activities  Pt will report at CJ level (20-40% impaired) on FOTO Shoulder to demonstrate increase in LE function with every day tasks.     Plan     Continue per ANJEL Heart, PT, DPT  Board Certified in Orthopedic Physical Therapy

## 2023-09-06 ENCOUNTER — CLINICAL SUPPORT (OUTPATIENT)
Dept: REHABILITATION | Facility: HOSPITAL | Age: 68
End: 2023-09-06
Payer: MEDICARE

## 2023-09-06 DIAGNOSIS — M25.511 ACUTE PAIN OF RIGHT SHOULDER: Primary | ICD-10-CM

## 2023-09-06 PROCEDURE — 97112 NEUROMUSCULAR REEDUCATION: CPT

## 2023-09-06 NOTE — PROGRESS NOTES
Physical Therapy Daily Treatment Note     Name: Radha Fraga  Clinic Number: [id number]    Therapy Diagnosis:   Encounter Diagnosis   Name Primary?    Acute pain of right shoulder Yes     Physician: Bev Valenzuela MD    Visit Date: 9/6/2023    Physician Orders: PT Eval and Treat  Medical Diagnosis from Referral: M25.511 (ICD-10-CM) - Acute pain of right shoulder; M54.2 (ICD-10-CM) - Neck pain  Evaluation Date: 7/17/2023  Authorization Period Expiration: 12/31/2023  Plan of Care Expiration: 10/17/2023   Visit # / Visits authorized: 4/20     FOTO: 45  FOTO 1st Follow-up: NA  FOTO 2nd Follow-up: NA     Time In: 0900  Time Out: 1000  Total Billable Time: 60 minutes     Precautions: Standard    Subjective     Pt reports soreness over the long weekend as she has been working to hang a rack in her garage.  She was compliant with home exercise program.  Response to previous treatment: Good  Functional change: improved thoracic    Pain: 3/10  Location: left shoulder      Objective     Passive Range of Motion:   Shoulder Right Left   Flexion 165 165   Abduction 160 160   ER at 0 45 20   ER at 90 40 15   IR full full   * = pain         Treatment     Radha received the treatments listed below:       Therapeutic exercises to develop strength, endurance, ROM, posture, and core stabilization for 0 minutes including:  UBE completed for 8' to increase ROM, endurance, and decrease pain to improve tolerance to ADLs and age-related activities  Cat Camel  Open Books  Table T-sp Flexion     Manual therapy techniques: Joint mobilizations, Manual traction, and Soft tissue Mobilization were applied to the: c-sp, B shoulder, and t-sp for 0 minutes, including:  Upper t-sp P/A Mobs  C-sp distraction and med/lat mobs  B shoulder GHJ A/P Mobs     Neuromuscular re-education activities to improve: Balance, Coordination, Kinesthetic, Proprioception, and Posture for 30 minutes. The following activities were included:  Prone T, Y, W  Serratus  pushes on table    Wall Walks/Clocks YTB  Rows 45 degree  IR/ER RTB/GTB  Slot Machines 3# 3 x 15    NP:  No_money      Therapeutic activities to improve functional performance for 0 minutes, including:     Home Exercises and Patient Education Provided      Education provided:   - t-sp mobility and cuff strengthening  - Prognosis, activity modification, goals for therapy, role of therapy for care, exercises/HEP     Written Home Exercises Provided:  Exercises were reviewed and Radha was able to demonstrate them prior to the end of the session.   Pt received a written copy of exercises to perform at home. Radha demonstrated good understanding of the education provided.      See EMR under patient instructions for exercises given.     Assessment     B RC weakness and stiff upper back  L UT activation in quiet stance; elevated L shoulder posturing. Worked in more UT and Lev Scap active mobility and STM throughout tissue; improved posturing and sx behavior post manual. Discussed shoulder depression and avoidance of compensatory motion in the future.  Radha Is progressing well towards her goals.   Pt prognosis is Good.     Pt will continue to benefit from skilled outpatient physical therapy to address the deficits listed in the problem list box on initial evaluation, provide pt/family education and to maximize pt's level of independence in the home and community environment.     Pt's spiritual, cultural and educational needs considered and pt agreeable to plan of care and goals.    Anticipated barriers to physical therapy: Age    GOALS   Short Term Goals: 4 weeks  Pt will report decreased B shoulder pain  < / =  3/10  to increase tolerance for ADL performance and recreational routine.  Pt will improve B shoulder ROM to WFL in order to be able to perform ADLs without difficulty.  Pt will improve B RC strength by 1/3 MMT grade to increase tolerance for ADL and work activities  Pt will demonstrate tolerance to HEP to improve  independence with ADL's     Long Term Goals: 8 weeks  Pt will report decreased B shoulder pain  < / =  1/10  to increase tolerance for ADL performance and recreational routine  Pt will improve B shoulder ROM to WNL in order to be able to perform ADLs without difficulty  Pt will improve B RC strength by 1/3 MMT grade to increase tolerance for ADL and work activities  Pt will report at CJ level (20-40% impaired) on FOTO Shoulder to demonstrate increase in LE function with every day tasks.     Plan     Continue per ANJEL Heart, PT, DPT  Board Certified in Orthopedic Physical Therapy

## 2023-09-08 ENCOUNTER — OFFICE VISIT (OUTPATIENT)
Dept: OPHTHALMOLOGY | Facility: CLINIC | Age: 68
End: 2023-09-08
Payer: MEDICARE

## 2023-09-08 DIAGNOSIS — H02.054 TRICHIASIS OF LEFT UPPER EYELID: ICD-10-CM

## 2023-09-08 DIAGNOSIS — H40.1131 PRIMARY OPEN-ANGLE GLAUCOMA, BILATERAL, MILD STAGE: Primary | ICD-10-CM

## 2023-09-08 DIAGNOSIS — H25.13 NUCLEAR SCLEROTIC CATARACT, BILATERAL: ICD-10-CM

## 2023-09-08 PROCEDURE — 1160F PR REVIEW ALL MEDS BY PRESCRIBER/CLIN PHARMACIST DOCUMENTED: ICD-10-PCS | Mod: CPTII,S$GLB,, | Performed by: OPHTHALMOLOGY

## 2023-09-08 PROCEDURE — 3288F FALL RISK ASSESSMENT DOCD: CPT | Mod: CPTII,S$GLB,, | Performed by: OPHTHALMOLOGY

## 2023-09-08 PROCEDURE — 99999 PR PBB SHADOW E&M-EST. PATIENT-LVL II: CPT | Mod: PBBFAC,,, | Performed by: OPHTHALMOLOGY

## 2023-09-08 PROCEDURE — 1160F RVW MEDS BY RX/DR IN RCRD: CPT | Mod: CPTII,S$GLB,, | Performed by: OPHTHALMOLOGY

## 2023-09-08 PROCEDURE — 3288F PR FALLS RISK ASSESSMENT DOCUMENTED: ICD-10-PCS | Mod: CPTII,S$GLB,, | Performed by: OPHTHALMOLOGY

## 2023-09-08 PROCEDURE — 1126F PR PAIN SEVERITY QUANTIFIED, NO PAIN PRESENT: ICD-10-PCS | Mod: CPTII,S$GLB,, | Performed by: OPHTHALMOLOGY

## 2023-09-08 PROCEDURE — 92133 POSTERIOR SEGMENT OCT OPTIC NERVE(OCULAR COHERENCE TOMOGRAPHY) - OU - BOTH EYES: ICD-10-PCS | Mod: S$GLB,,, | Performed by: OPHTHALMOLOGY

## 2023-09-08 PROCEDURE — 1101F PR PT FALLS ASSESS DOC 0-1 FALLS W/OUT INJ PAST YR: ICD-10-PCS | Mod: CPTII,S$GLB,, | Performed by: OPHTHALMOLOGY

## 2023-09-08 PROCEDURE — 99213 OFFICE O/P EST LOW 20 MIN: CPT | Mod: S$GLB,,, | Performed by: OPHTHALMOLOGY

## 2023-09-08 PROCEDURE — 4010F ACE/ARB THERAPY RXD/TAKEN: CPT | Mod: CPTII,S$GLB,, | Performed by: OPHTHALMOLOGY

## 2023-09-08 PROCEDURE — 1159F PR MEDICATION LIST DOCUMENTED IN MEDICAL RECORD: ICD-10-PCS | Mod: CPTII,S$GLB,, | Performed by: OPHTHALMOLOGY

## 2023-09-08 PROCEDURE — 1126F AMNT PAIN NOTED NONE PRSNT: CPT | Mod: CPTII,S$GLB,, | Performed by: OPHTHALMOLOGY

## 2023-09-08 PROCEDURE — 1159F MED LIST DOCD IN RCRD: CPT | Mod: CPTII,S$GLB,, | Performed by: OPHTHALMOLOGY

## 2023-09-08 PROCEDURE — 92133 CPTRZD OPH DX IMG PST SGM ON: CPT | Mod: S$GLB,,, | Performed by: OPHTHALMOLOGY

## 2023-09-08 PROCEDURE — 99213 PR OFFICE/OUTPT VISIT, EST, LEVL III, 20-29 MIN: ICD-10-PCS | Mod: S$GLB,,, | Performed by: OPHTHALMOLOGY

## 2023-09-08 PROCEDURE — 4010F PR ACE/ARB THEARPY RXD/TAKEN: ICD-10-PCS | Mod: CPTII,S$GLB,, | Performed by: OPHTHALMOLOGY

## 2023-09-08 PROCEDURE — 99999 PR PBB SHADOW E&M-EST. PATIENT-LVL II: ICD-10-PCS | Mod: PBBFAC,,, | Performed by: OPHTHALMOLOGY

## 2023-09-08 PROCEDURE — 1101F PT FALLS ASSESS-DOCD LE1/YR: CPT | Mod: CPTII,S$GLB,, | Performed by: OPHTHALMOLOGY

## 2023-09-08 NOTE — PROGRESS NOTES
HPI     Follow-up     Additional comments: 6 month IOP, DFE & OCT Nerve. Denies eye pain today.   Does not use any eye gtts currently.            Last edited by Leora Jaime on 9/8/2023  9:41 AM.            Assessment /Plan     For exam results, see Encounter Report.    Primary open-angle glaucoma, bilateral, mild stage    Nuclear sclerotic cataract, bilateral      1. Primary open-angle glaucoma, bilateral, mild stage  Unknown famhx  Thin pachy    Very high IOP OD>OS tmax 38/28  Optic nerves asymmetry OD>OS  OCT progression OD, recently stable, Stable OS  HVF normal, stable and repeat  S/p SLT 2022 OU, excellent response  Target IOP low 20s    IOP at target, need to watch OD closely  Continue to monitor off meds    Fu 4-6 months, HVF, IOP    2. Nuclear sclerotic cataract, bilateral  Mild, observe    3. AMI trichiasis  Present, but patient does not feel  Observe

## 2023-09-13 ENCOUNTER — CLINICAL SUPPORT (OUTPATIENT)
Dept: REHABILITATION | Facility: HOSPITAL | Age: 68
End: 2023-09-13
Payer: MEDICARE

## 2023-09-13 DIAGNOSIS — M25.511 ACUTE PAIN OF RIGHT SHOULDER: Primary | ICD-10-CM

## 2023-09-13 PROCEDURE — 97530 THERAPEUTIC ACTIVITIES: CPT

## 2023-09-13 PROCEDURE — 97112 NEUROMUSCULAR REEDUCATION: CPT

## 2023-09-13 NOTE — PROGRESS NOTES
Physical Therapy Daily Treatment Note     Name: Radha Fraga  Clinic Number: [id number]    Therapy Diagnosis:   Encounter Diagnosis   Name Primary?    Acute pain of right shoulder Yes     Physician: Bev Valenzuela MD    Visit Date: 9/13/2023    Physician Orders: PT Eval and Treat  Medical Diagnosis from Referral: M25.511 (ICD-10-CM) - Acute pain of right shoulder; M54.2 (ICD-10-CM) - Neck pain  Evaluation Date: 7/17/2023  Authorization Period Expiration: 12/31/2023  Plan of Care Expiration: 10/17/2023   Visit # / Visits authorized: 5/20     FOTO: 45  FOTO 1st Follow-up: NA  FOTO 2nd Follow-up: NA     Time In: 0900  Time Out: 1000  Total Billable Time: 60 minutes     Precautions: Standard    Subjective     Pt reports improved sx behavior overall, but had an issue on Sunday where she experienced some sharp pain into the L lateral brachial region. Has since dissipated.  She was compliant with home exercise program.  Response to previous treatment: Good  Functional change: improved thoracic    Pain: 3/10  Location: left shoulder      Objective     Active Range of Motion:   Shoulder Right Left   Flexion 175 172*   Abduction 170 165*   ER at 0 45 40   ER at 90 40 30*   IR full full   * = pain      Treatment     Radha received the treatments listed below:       Therapeutic exercises to develop strength, endurance, ROM, posture, and core stabilization for 0 minutes including:  UBE completed for 8' to increase ROM, endurance, and decrease pain to improve tolerance to ADLs and age-related activities  Cat Camel  Open Books  Table T-sp Flexion     Manual therapy techniques: Joint mobilizations, Manual traction, and Soft tissue Mobilization were applied to the: c-sp, B shoulder, and t-sp for 0 minutes, including:  Upper t-sp P/A Mobs  C-sp distraction and med/lat mobs  B shoulder GHJ A/P Mobs     Neuromuscular re-education activities to improve: Balance, Coordination, Kinesthetic, Proprioception, and Posture for 30 minutes.  The following activities were included:  Prone T, Y, W  Serratus pushes on table  Prone swimmers 3 x 10 ea  Prone upper back extension with UE ext 2 x 15 ea    NP:  Wall Walks/Clocks YTB  Rows 45 degree  IR/ER RTB/GTB  Slot Machines 3# 3 x 15    NP:  No_money      Therapeutic activities to improve functional performance for 30 minutes, including:  UBE completed for 10' to increase ROM, endurance, and decrease pain to improve tolerance to ADLs and age-related activities  Prone full swimmers 3 x 8  Plank Shoulder Taps 2 x 15     Home Exercises and Patient Education Provided      Education provided:   - t-sp mobility and cuff strengthening  - Prognosis, activity modification, goals for therapy, role of therapy for care, exercises/HEP     Written Home Exercises Provided:  Exercises were reviewed and Radha was able to demonstrate them prior to the end of the session.   Pt received a written copy of exercises to perform at home. Radha demonstrated good understanding of the education provided.      See EMR under patient instructions for exercises given.     Assessment     B RC weakness and stiff upper back  Session focused on carry-over to swimming activities. Significantly improved performance and lack of sx behavior with proper scap setting in prone; able to clear through full range without sx limitations.  Radha Is progressing well towards her goals.   Pt prognosis is Good.     Pt will continue to benefit from skilled outpatient physical therapy to address the deficits listed in the problem list box on initial evaluation, provide pt/family education and to maximize pt's level of independence in the home and community environment.     Pt's spiritual, cultural and educational needs considered and pt agreeable to plan of care and goals.    Anticipated barriers to physical therapy: Age    GOALS   Short Term Goals: 4 weeks  Pt will report decreased B shoulder pain  < / =  3/10  to increase tolerance for ADL performance and  recreational routine.  Pt will improve B shoulder ROM to WFL in order to be able to perform ADLs without difficulty.  Pt will improve B RC strength by 1/3 MMT grade to increase tolerance for ADL and work activities  Pt will demonstrate tolerance to HEP to improve independence with ADL's     Long Term Goals: 8 weeks  Pt will report decreased B shoulder pain  < / =  1/10  to increase tolerance for ADL performance and recreational routine  Pt will improve B shoulder ROM to WNL in order to be able to perform ADLs without difficulty  Pt will improve B RC strength by 1/3 MMT grade to increase tolerance for ADL and work activities  Pt will report at CJ level (20-40% impaired) on FOTO Shoulder to demonstrate increase in LE function with every day tasks.     Plan     Continue per ANJEL Heart, PT, DPT  Board Certified in Orthopedic Physical Therapy

## 2023-09-20 ENCOUNTER — CLINICAL SUPPORT (OUTPATIENT)
Dept: REHABILITATION | Facility: HOSPITAL | Age: 68
End: 2023-09-20
Payer: MEDICARE

## 2023-09-20 DIAGNOSIS — M25.511 ACUTE PAIN OF RIGHT SHOULDER: Primary | ICD-10-CM

## 2023-09-20 PROCEDURE — 97112 NEUROMUSCULAR REEDUCATION: CPT

## 2023-09-20 PROCEDURE — 97530 THERAPEUTIC ACTIVITIES: CPT

## 2023-09-20 NOTE — PROGRESS NOTES
Physical Therapy Daily Treatment Note     Name: Radha Fraga  Clinic Number: [id number]    Therapy Diagnosis:   Encounter Diagnosis   Name Primary?    Acute pain of right shoulder Yes     Physician: Bev Valenzuela MD    Visit Date: 9/20/2023    Physician Orders: PT Eval and Treat  Medical Diagnosis from Referral: M25.511 (ICD-10-CM) - Acute pain of right shoulder; M54.2 (ICD-10-CM) - Neck pain  Evaluation Date: 7/17/2023  Authorization Period Expiration: 12/31/2023  Plan of Care Expiration: 10/17/2023   Visit # / Visits authorized: 6/20     FOTO: 45  FOTO 1st Follow-up: NA  FOTO 2nd Follow-up: NA     Time In: 0900  Time Out: 1000  Total Billable Time: 60 minutes     Precautions: Standard    Subjective     Pt reports still having issues with end-range FLEX/ABD discomfort, but feels like her strength is improving.  She was compliant with home exercise program.  Response to previous treatment: Good  Functional change: improved thoracic    Pain: 3/10  Location: left shoulder      Objective     Active Range of Motion:   Shoulder Right Left   Flexion 175 172*   Abduction 170 165*   ER at 0 45 40   ER at 90 40 30*   IR full full   * = pain      Treatment     Radha received the treatments listed below:       Therapeutic exercises to develop strength, endurance, ROM, posture, and core stabilization for 0 minutes including:  UBE completed for 8' to increase ROM, endurance, and decrease pain to improve tolerance to ADLs and age-related activities  Cat Camel  Open Books  Table T-sp Flexion     Manual therapy techniques: Joint mobilizations, Manual traction, and Soft tissue Mobilization were applied to the: c-sp, B shoulder, and t-sp for 0 minutes, including:  Upper t-sp P/A Mobs  C-sp distraction and med/lat mobs  B shoulder GHJ A/P Mobs     Neuromuscular re-education activities to improve: Balance, Coordination, Kinesthetic, Proprioception, and Posture for 30 minutes. The following activities were included:  Prone T, Y,  W  Serratus pushes on table  Prone swimmers 3 x 10 ea  Prone upper back extension with UE ext 2 x 15 ea    NP:  Wall Walks/Clocks YTB  Rows 45 degree  IR/ER RTB/GTB  Slot Machines 3# 3 x 15    NP:  No_money      Therapeutic activities to improve functional performance for 30 minutes, including:  UBE completed for 10' to increase ROM, endurance, and decrease pain to improve tolerance to ADLs and age-related activities  Prone full swimmers 3 x 8  Plank Shoulder Taps 2 x 15     Home Exercises and Patient Education Provided      Education provided:   - t-sp mobility and cuff strengthening  - Prognosis, activity modification, goals for therapy, role of therapy for care, exercises/HEP     Written Home Exercises Provided:  Exercises were reviewed and Radha was able to demonstrate them prior to the end of the session.   Pt received a written copy of exercises to perform at home. Radha demonstrated good understanding of the education provided.      See EMR under patient instructions for exercises given.     Assessment     B RC weakness and stiff upper back  Continued progression to swimming motions and activity. Improved prone-based Y motions with good scap setting without issues.  Radha Is progressing well towards her goals.   Pt prognosis is Good.     Pt will continue to benefit from skilled outpatient physical therapy to address the deficits listed in the problem list box on initial evaluation, provide pt/family education and to maximize pt's level of independence in the home and community environment.     Pt's spiritual, cultural and educational needs considered and pt agreeable to plan of care and goals.    Anticipated barriers to physical therapy: Age    GOALS   Short Term Goals: 4 weeks  Pt will report decreased B shoulder pain  < / =  3/10  to increase tolerance for ADL performance and recreational routine.  Pt will improve B shoulder ROM to WFL in order to be able to perform ADLs without difficulty.  Pt will improve  B RC strength by 1/3 MMT grade to increase tolerance for ADL and work activities  Pt will demonstrate tolerance to HEP to improve independence with ADL's     Long Term Goals: 8 weeks  Pt will report decreased B shoulder pain  < / =  1/10  to increase tolerance for ADL performance and recreational routine  Pt will improve B shoulder ROM to WNL in order to be able to perform ADLs without difficulty  Pt will improve B RC strength by 1/3 MMT grade to increase tolerance for ADL and work activities  Pt will report at CJ level (20-40% impaired) on FOTO Shoulder to demonstrate increase in LE function with every day tasks.     Plan     Continue per ANJEL Heart, PT, DPT  Board Certified in Orthopedic Physical Therapy

## 2023-10-11 ENCOUNTER — ANESTHESIA EVENT (OUTPATIENT)
Dept: ENDOSCOPY | Facility: HOSPITAL | Age: 68
End: 2023-10-11
Payer: MEDICARE

## 2023-10-11 ENCOUNTER — CLINICAL SUPPORT (OUTPATIENT)
Dept: REHABILITATION | Facility: HOSPITAL | Age: 68
End: 2023-10-11
Payer: MEDICARE

## 2023-10-11 DIAGNOSIS — M25.511 ACUTE PAIN OF RIGHT SHOULDER: Primary | ICD-10-CM

## 2023-10-11 PROCEDURE — 97112 NEUROMUSCULAR REEDUCATION: CPT

## 2023-10-11 NOTE — PROGRESS NOTES
Physical Therapy Daily Treatment Note     Name: Radha Fraga  Clinic Number: [id number]    Therapy Diagnosis:   Encounter Diagnosis   Name Primary?    Acute pain of right shoulder Yes     Physician: Bev Valenzuela MD    Visit Date: 10/11/2023    Physician Orders: PT Eval and Treat  Medical Diagnosis from Referral: M25.511 (ICD-10-CM) - Acute pain of right shoulder; M54.2 (ICD-10-CM) - Neck pain  Evaluation Date: 7/17/2023  Authorization Period Expiration: 12/31/2023  Plan of Care Expiration: 10/17/2023   Visit # / Visits authorized: 7/20     FOTO: 45  FOTO 1st Follow-up: NA  FOTO 2nd Follow-up: NA     Time In: 0900  Time Out: 1000  Total Billable Time: 25 minutes     Precautions: Standard    Subjective     Pt reports returns from weeklong road trip. Feels like her shoulder pain is improving, but she developed some neck discomfort.  She was compliant with home exercise program.  Response to previous treatment: Good  Functional change: improved thoracic    Pain: 3/10  Location: left shoulder      Objective     Active Range of Motion:   Shoulder Right Left   Flexion 175 172*   Abduction 170 165*   ER at 0 45 40   ER at 90 40 30*   IR full full   * = pain      Treatment     Radha received the treatments listed below:       Therapeutic exercises to develop strength, endurance, ROM, posture, and core stabilization for 0 minutes including:  UBE completed for 8' to increase ROM, endurance, and decrease pain to improve tolerance to ADLs and age-related activities  Cat Camel  Open Books  Table T-sp Flexion     Manual therapy techniques: Joint mobilizations, Manual traction, and Soft tissue Mobilization were applied to the: c-sp, B shoulder, and t-sp for 0 minutes, including:  Upper t-sp P/A Mobs  C-sp distraction and med/lat mobs  B shoulder GHJ A/P Mobs     Neuromuscular re-education activities to improve: Balance, Coordination, Kinesthetic, Proprioception, and Posture for 23 minutes. The following activities were  included:  Prone T, Y, W  Serratus pushes on table  Prone swimmers 3 x 10 ea  Prone upper back extension with UE ext 2 x 15 ea    NP:  Wall Walks/Clocks YTB  Rows 45 degree  IR/ER RTB/GTB  Slot Machines 3# 3 x 15    NP:  No_money      Therapeutic activities to improve functional performance for 0 minutes, including:  UBE completed for 10' to increase ROM, endurance, and decrease pain to improve tolerance to ADLs and age-related activities  Prone full swimmers 3 x 8  Plank Shoulder Taps 2 x 15     Home Exercises and Patient Education Provided      Education provided:   - t-sp mobility and cuff strengthening  - Prognosis, activity modification, goals for therapy, role of therapy for care, exercises/HEP     Written Home Exercises Provided:  Exercises were reviewed and Radha was able to demonstrate them prior to the end of the session.   Pt received a written copy of exercises to perform at home. Radha demonstrated good understanding of the education provided.      See EMR under patient instructions for exercises given.     Assessment     B RC weakness and stiff upper back  Mild neck stiffness developed since last session; no joint restrictions, more compensation from postural muscles with prolonged sitting while on road trip. Worked in more thoracic mobility and upper back strengthening interventions.  Radha Is progressing well towards her goals.   Pt prognosis is Good.     Pt will continue to benefit from skilled outpatient physical therapy to address the deficits listed in the problem list box on initial evaluation, provide pt/family education and to maximize pt's level of independence in the home and community environment.     Pt's spiritual, cultural and educational needs considered and pt agreeable to plan of care and goals.    Anticipated barriers to physical therapy: Age    GOALS   Short Term Goals: 4 weeks  Pt will report decreased B shoulder pain  < / =  3/10  to increase tolerance for ADL performance and  recreational routine.  Pt will improve B shoulder ROM to WFL in order to be able to perform ADLs without difficulty.  Pt will improve B RC strength by 1/3 MMT grade to increase tolerance for ADL and work activities  Pt will demonstrate tolerance to HEP to improve independence with ADL's     Long Term Goals: 8 weeks  Pt will report decreased B shoulder pain  < / =  1/10  to increase tolerance for ADL performance and recreational routine  Pt will improve B shoulder ROM to WNL in order to be able to perform ADLs without difficulty  Pt will improve B RC strength by 1/3 MMT grade to increase tolerance for ADL and work activities  Pt will report at CJ level (20-40% impaired) on FOTO Shoulder to demonstrate increase in LE function with every day tasks.     Plan     Continue per ANJEL Heart, PT, DPT  Board Certified in Orthopedic Physical Therapy

## 2023-10-12 ENCOUNTER — ANESTHESIA (OUTPATIENT)
Dept: ENDOSCOPY | Facility: HOSPITAL | Age: 68
End: 2023-10-12
Payer: MEDICARE

## 2023-10-12 ENCOUNTER — HOSPITAL ENCOUNTER (OUTPATIENT)
Facility: HOSPITAL | Age: 68
Discharge: HOME | End: 2023-10-12
Attending: INTERNAL MEDICINE | Admitting: INTERNAL MEDICINE
Payer: MEDICARE

## 2023-10-12 VITALS
HEART RATE: 58 BPM | TEMPERATURE: 98 F | RESPIRATION RATE: 18 BRPM | SYSTOLIC BLOOD PRESSURE: 143 MMHG | OXYGEN SATURATION: 99 % | DIASTOLIC BLOOD PRESSURE: 80 MMHG

## 2023-10-12 DIAGNOSIS — D12.6 TUBULAR ADENOMA OF COLON: ICD-10-CM

## 2023-10-12 PROCEDURE — 25000003 PHARM REV CODE 250: Performed by: STUDENT IN AN ORGANIZED HEALTH CARE EDUCATION/TRAINING PROGRAM

## 2023-10-12 PROCEDURE — 63600175 PHARM REV CODE 636 W HCPCS: Performed by: STUDENT IN AN ORGANIZED HEALTH CARE EDUCATION/TRAINING PROGRAM

## 2023-10-12 PROCEDURE — G0105 COLORECTAL SCRN; HI RISK IND: HCPCS | Performed by: INTERNAL MEDICINE

## 2023-10-12 PROCEDURE — D9220A PRA ANESTHESIA: ICD-10-PCS | Mod: ANES,,, | Performed by: ANESTHESIOLOGY

## 2023-10-12 PROCEDURE — D9220A PRA ANESTHESIA: Mod: ANES,,, | Performed by: ANESTHESIOLOGY

## 2023-10-12 PROCEDURE — 37000009 HC ANESTHESIA EA ADD 15 MINS: Performed by: INTERNAL MEDICINE

## 2023-10-12 PROCEDURE — D9220A PRA ANESTHESIA: ICD-10-PCS | Mod: CRNA,,, | Performed by: NURSE ANESTHETIST, CERTIFIED REGISTERED

## 2023-10-12 PROCEDURE — 37000008 HC ANESTHESIA 1ST 15 MINUTES: Performed by: INTERNAL MEDICINE

## 2023-10-12 PROCEDURE — G0105 COLORECTAL SCRN; HI RISK IND: HCPCS | Mod: ,,, | Performed by: INTERNAL MEDICINE

## 2023-10-12 PROCEDURE — G0105 COLORECTAL SCRN; HI RISK IND: ICD-10-PCS | Mod: ,,, | Performed by: INTERNAL MEDICINE

## 2023-10-12 PROCEDURE — D9220A PRA ANESTHESIA: Mod: CRNA,,, | Performed by: NURSE ANESTHETIST, CERTIFIED REGISTERED

## 2023-10-12 PROCEDURE — 25000003 PHARM REV CODE 250: Performed by: ANESTHESIOLOGY

## 2023-10-12 RX ORDER — SODIUM CHLORIDE 9 MG/ML
INJECTION, SOLUTION INTRAVENOUS CONTINUOUS
Status: DISCONTINUED | OUTPATIENT
Start: 2023-10-12 | End: 2023-10-12 | Stop reason: HOSPADM

## 2023-10-12 RX ORDER — LIDOCAINE HYDROCHLORIDE 20 MG/ML
INJECTION INTRAVENOUS
Status: DISCONTINUED | OUTPATIENT
Start: 2023-10-12 | End: 2023-10-12

## 2023-10-12 RX ORDER — LIDOCAINE HYDROCHLORIDE 20 MG/ML
INJECTION, SOLUTION EPIDURAL; INFILTRATION; INTRACAUDAL; PERINEURAL
Status: DISCONTINUED
Start: 2023-10-12 | End: 2023-10-12 | Stop reason: HOSPADM

## 2023-10-12 RX ORDER — PROPOFOL 10 MG/ML
VIAL (ML) INTRAVENOUS
Status: DISCONTINUED | OUTPATIENT
Start: 2023-10-12 | End: 2023-10-12

## 2023-10-12 RX ORDER — PROPOFOL 10 MG/ML
INJECTION, EMULSION INTRAVENOUS
Status: DISCONTINUED
Start: 2023-10-12 | End: 2023-10-12 | Stop reason: HOSPADM

## 2023-10-12 RX ADMIN — SODIUM CHLORIDE: 0.9 INJECTION, SOLUTION INTRAVENOUS at 10:10

## 2023-10-12 RX ADMIN — PROPOFOL 20 MG: 10 INJECTION, EMULSION INTRAVENOUS at 10:10

## 2023-10-12 RX ADMIN — PROPOFOL 30 MG: 10 INJECTION, EMULSION INTRAVENOUS at 10:10

## 2023-10-12 RX ADMIN — PROPOFOL 100 MG: 10 INJECTION, EMULSION INTRAVENOUS at 10:10

## 2023-10-12 RX ADMIN — LIDOCAINE HYDROCHLORIDE 100 MG: 20 INJECTION, SOLUTION INTRAVENOUS at 10:10

## 2023-10-12 NOTE — ANESTHESIA PREPROCEDURE EVALUATION
10/12/2023  Radha Fraga is a 68 y.o., female.  To undergo Procedure(s) (LRB):  COLONOSCOPY (N/A)     Denies CP/SOB/GERD/MI/CVA/URI symptoms.  METS > 4  NPO > 8    Past Medical History:  Past Medical History:   Diagnosis Date    Asymptomatic cholelithiasis     Hypothyroidism     Lichen planus     Scoliosis     Unspecified essential hypertension 3/25/2014       Past Surgical History:  Past Surgical History:   Procedure Laterality Date    COLONOSCOPY      normal.   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     Employer: RETIRED   Tobacco Use    Smoking status: Former     Current packs/day: 0.00     Types: Cigarettes     Quit date: 10/12/2004     Years since quittin.0    Smokeless tobacco: Never   Substance and Sexual Activity    Alcohol use: Yes     Comment: occassionally    Drug use: No    Sexual activity: Yes     Social Determinants of Health     Financial Resource Strain: Low Risk  (2023)    Overall Financial Resource Strain (CARDIA)     Difficulty of Paying Living Expenses: Not hard at all   Food Insecurity: No Food Insecurity (2023)    Hunger Vital Sign     Worried About Running Out of Food in the Last Year: Never true     Ran Out of Food in the Last Year: Never true   Transportation Needs: No Transportation Needs (2023)    PRAPARE - Transportation     Lack of Transportation (Medical): No     Lack of Transportation (Non-Medical): No   Physical Activity: Insufficiently Active (2023)    Exercise Vital Sign     Days of Exercise per Week: 1  day     Minutes of Exercise per Session: 20 min   Stress: No Stress Concern Present (8/24/2023)    [employer] [place] [employer] - Occupational Stress Questionnaire     Feeling of Stress : Not at all   Social Connections: Unknown (8/24/2023)    Social Connection and Isolation Panel [NHANES]     Frequency of Communication with Friends and Family: Three times a week     Frequency of Social Gatherings with Friends and Family: Once a week     Active Member of Clubs or Organizations: Yes     Attends Club or Organization Meetings: More than 4 times per year     Marital Status:    Housing Stability: Low Risk  (8/24/2023)    Housing Stability Vital Sign     Unable to Pay for Housing in the Last Year: No     Number of Places Lived in the Last Year: 1     Unstable Housing in the Last Year: No       Medications:  No current facility-administered medications on file prior to encounter.     Current Outpatient Medications on File Prior to Encounter   Medication Sig Dispense Refill    atorvastatin (LIPITOR) 10 MG tablet Take 1 tablet (10 mg total) by mouth once daily. 90 tablet 3    levothyroxine (SYNTHROID) 75 MCG tablet TAKE 1 TABLET BY MOUTH BEFORE BREAKFAST. 90 tablet 3    losartan (COZAAR) 25 MG tablet TAKE 1 TABLET BY MOUTH EVERY DAY 90 tablet 3    methocarbamoL (ROBAXIN) 500 MG Tab Take 1 tablet (500 mg total) by mouth nightly as needed (muscle spasm). May cause sedation 30 tablet 0       Allergies:  Review of patient's allergies indicates:   Allergen Reactions    Iodinated contrast media        Active Problems:  Patient Active Problem List   Diagnosis    Asymptomatic cholelithiasis    Lichen planus    Scoliosis    Essential hypertension    Tubular adenoma of colon 2013 and 2018    Alopecia areata    Migraine syndrome    Hypothyroidism due to Hashimoto's thyroiditis    Chronic midline low back pain without sciatica    Palpitations    COVID-19 virus detected    Osteopenia of  multiple sites    Decreased ROM of trunk and back    Decreased strength of trunk and back    Dyslipidemia    Acute pain of right shoulder       Diagnostic Studies:   Latest Reference Range & Units 07/26/23 07:40   WBC 3.90 - 12.70 K/uL 4.73   RBC 4.00 - 5.40 M/uL 4.33   Hemoglobin 12.0 - 16.0 g/dL 13.5   Hematocrit 37.0 - 48.5 % 42.5   MCV 82 - 98 fL 98   MCH 27.0 - 31.0 pg 31.2 (H)   MCHC 32.0 - 36.0 g/dL 31.8 (L)   RDW 11.5 - 14.5 % 12.9   Platelet Count 150 - 450 K/uL 222   MPV 9.2 - 12.9 fL 10.4   Gran % 38.0 - 73.0 % 47.9   Lymph % 18.0 - 48.0 % 41.6   Mono % 4.0 - 15.0 % 8.0   Eosinophil % 0.0 - 8.0 % 1.9   Basophil % 0.0 - 1.9 % 0.4   Immature Granulocytes 0.0 - 0.5 % 0.2   Gran # (ANC) 1.8 - 7.7 K/uL 2.3   Lymph # 1.0 - 4.8 K/uL 2.0   Mono # 0.3 - 1.0 K/uL 0.4   Eos # 0.0 - 0.5 K/uL 0.1   Baso # 0.00 - 0.20 K/uL 0.02   Immature Grans (Abs) 0.00 - 0.04 K/uL 0.01   nRBC 0 /100 WBC 0   Differential Method  Automated      Latest Reference Range & Units 07/26/23 07:40   Sodium 136 - 145 mmol/L 143   Potassium 3.5 - 5.1 mmol/L 4.2   Chloride 95 - 110 mmol/L 106   CO2 23 - 29 mmol/L 27   Anion Gap 8 - 16 mmol/L 10   BUN 8 - 23 mg/dL 17   Creatinine 0.5 - 1.4 mg/dL 0.9   eGFR >60 mL/min/1.73 m^2 >60.0     TTE (2/12/21):   The left ventricle is normal in size with eccentric hypertrophy and normal systolic function. The estimated ejection fraction is 60%   Indeterminate left ventricular diastolic function.   Normal right ventricular size with normal right ventricular systolic function.   Mild mitral regurgitation.   There is no pulmonary hypertension.    24 Hour Vitals:      See Nursing Charting For Additional Vitals      Pre-op Assessment    I have reviewed the Patient Summary Reports.     I have reviewed the Nursing Notes.       Review of Systems  Anesthesia Hx:  No problems with previous Anesthesia   Denies Personal Hx of Anesthesia complications.   Social:  Former Smoker, Social Alcohol Use     Cardiovascular:   Exercise tolerance: good Hypertension    Pulmonary:  Pulmonary Normal    Hepatic/GI:  Hepatic/GI Normal    Musculoskeletal:   Scoliosis   Neurological:   Headaches    Endocrine:   Hypothyroidism        Physical Exam  General: Well nourished and Cooperative    Airway:  Mallampati: II   Mouth Opening: Normal  TM Distance: Normal      Dental:  Intact    Chest/Lungs:  Clear to auscultation, Normal Respiratory Rate    Heart:  Rate: Normal  Rhythm: Regular Rhythm        Anesthesia Plan  Type of Anesthesia, risks & benefits discussed:    Anesthesia Type: Gen ETT, Gen Natural Airway, MAC  Intra-op Monitoring Plan: Standard ASA Monitors  Post Op Pain Control Plan: multimodal analgesia  Induction:  IV  Informed Consent: Informed consent signed with the Patient and all parties understand the risks and agree with anesthesia plan.  All questions answered.   ASA Score: 2    Ready For Surgery From Anesthesia Perspective.     .

## 2023-10-12 NOTE — TRANSFER OF CARE
Anesthesia Transfer of Care Note    Patient: Radha Fraga    Procedure(s) Performed: Procedure(s) (LRB):  COLONOSCOPY (N/A)    Patient location: GI    Anesthesia Type: general    Transport from OR: Transported from OR on room air with adequate spontaneous ventilation    Post pain: adequate analgesia    Post assessment: no apparent anesthetic complications and tolerated procedure well    Post vital signs: stable    Level of consciousness: awake and alert    Nausea/Vomiting: no nausea/vomiting    Complications: none    Transfer of care protocol was followed      Last vitals:   Visit Vitals  /68 (BP Location: Left arm, Patient Position: Lying)   Pulse 67   Temp 36.7 °C (98 °F) (Oral)   Resp 18   SpO2 98%   Breastfeeding No

## 2023-10-12 NOTE — H&P
Short Stay Endoscopy History and Physical    PCP - Chicho Meléndez MD    Procedure - Colonoscopy  ASA - per anesthesia  Mallampati - per anesthesia  History of Anesthesia problems - no  Family history Anesthesia problems - no   Plan of anesthesia - General    HPI:  This is a 68 y.o. female here for evaluation of : personal history of colon polyps      ROS:  Constitutional: No fevers, chills, No weight loss  CV: No chest pain  Pulm: No cough, No shortness of breath  GI: see HPI  Derm: No rash    Medical History:  has a past medical history of Asymptomatic cholelithiasis, Hypothyroidism, Lichen planus, Scoliosis, and Unspecified essential hypertension (3/25/2014).    Surgical History:  has a past surgical history that includes Tubal ligation; Tonsillectomy; Colonoscopy (2006); Nose surgery; and Colonoscopy (N/A, 3/23/2018).    Family History: family history includes Hypertension in her sister and son; No Known Problems in her daughter and son. She was adopted.. Otherwise no colon cancer, inflammatory bowel disease, or GI malignancies.    Social History:  reports that she quit smoking about 19 years ago. Her smoking use included cigarettes. She has never used smokeless tobacco. She reports current alcohol use. She reports that she does not use drugs.    Review of patient's allergies indicates:   Allergen Reactions    Iodinated contrast media        Medications:   Medications Prior to Admission   Medication Sig Dispense Refill Last Dose    atorvastatin (LIPITOR) 10 MG tablet Take 1 tablet (10 mg total) by mouth once daily. 90 tablet 3 10/12/2023    levothyroxine (SYNTHROID) 75 MCG tablet TAKE 1 TABLET BY MOUTH BEFORE BREAKFAST. 90 tablet 3 10/12/2023    losartan (COZAAR) 25 MG tablet TAKE 1 TABLET BY MOUTH EVERY DAY 90 tablet 3 10/12/2023    methocarbamoL (ROBAXIN) 500 MG Tab Take 1 tablet (500 mg total) by mouth nightly as needed (muscle spasm). May cause sedation 30 tablet 0          Physical Exam:    Vital  Signs:   Vitals:    10/12/23 0916   BP: 139/78   Pulse: 70   Resp: 16   Temp: 97.8 °F (36.6 °C)       General Appearance: Well appearing in no acute distress  Eyes:    No scleral icterus  ENT: Neck supple, Lips, mucosa, and tongue normal; teeth and gums normal  Abdomen: Soft, non tender, non distended with positive bowel sounds. No hepatosplenomegaly, ascites, or mass.  Extremities: 2+ pulses, no clubbing, cyanosis or edema  Skin: No rash      Labs:  Lab Results   Component Value Date    WBC 4.73 07/26/2023    HGB 13.5 07/26/2023    HCT 42.5 07/26/2023     07/26/2023    CHOL 140 07/26/2023    TRIG 49 07/26/2023    HDL 67 07/26/2023    ALT 16 07/26/2023    AST 21 07/26/2023     07/26/2023    K 4.2 07/26/2023     07/26/2023    CREATININE 0.9 07/26/2023    BUN 17 07/26/2023    CO2 27 07/26/2023    TSH 5.595 (H) 07/26/2023    HGBA1C 5.3 03/02/2018       I have explained the risks and benefits of endoscopy procedures to the patient including but not limited to bleeding, perforation, infection, and death.  The patient was asked if they understand and allowed to ask any further questions to their satisfaction.    Sky Paredes MD

## 2023-10-12 NOTE — ANESTHESIA POSTPROCEDURE EVALUATION
Anesthesia Post Evaluation    Patient: Radha Fraga    Procedure(s) Performed: Procedure(s) (LRB):  COLONOSCOPY (N/A)    Final Anesthesia Type: general      Patient location during evaluation: GI PACU  Patient participation: Yes- Able to Participate  Level of consciousness: awake and alert and oriented  Post-procedure vital signs: reviewed and stable  Pain management: adequate  Airway patency: patent    PONV status at discharge: No PONV  Anesthetic complications: no      Cardiovascular status: hemodynamically stable and blood pressure returned to baseline  Respiratory status: spontaneous ventilation, room air and unassisted  Hydration status: euvolemic  Follow-up not needed.          Vitals Value Taken Time   /80 10/12/23 1100   Temp 36.7 °C (98 °F) 10/12/23 1030   Pulse 58 10/12/23 1100   Resp 18 10/12/23 1100   SpO2 99 % 10/12/23 1100         Event Time   Out of Recovery 11:09:50         Pain/Zahraa Score: Zahraa Score: 10 (10/12/2023 11:00 AM)

## 2023-10-12 NOTE — PROVATION PATIENT INSTRUCTIONS
Discharge Summary/Instructions after an Endoscopic Procedure  Patient Name: Radha Fraga  Patient MRN: [id number]  Patient YOB: 1955 Thursday, October 12, 2023  Sky Paredes MD  Dear patient,  As a result of recent federal legislation (The Federal Cures Act), you may   receive lab or pathology results from your procedure in your [facility]   account before your physician is able to contact you. Your physician or   their representative will relay the results to you with their   recommendations at their soonest availability.  Thank you,  RESTRICTIONS:  During your procedure today, you received medications for sedation.  These   medications may affect your judgment, balance and coordination.  Therefore,   for 24 hours, you have the following restrictions:   - DO NOT drive a car, operate machinery, make legal/financial decisions,   sign important papers or drink alcohol.    ACTIVITY:  Today: no heavy lifting, straining or running due to procedural   sedation/anesthesia.  The following day: return to full activity including work.  DIET:  Eat and drink normally unless instructed otherwise.     TREATMENT FOR COMMON SIDE EFFECTS:  - Mild abdominal pain, nausea, belching, bloating or excessive gas:  rest,   eat lightly and use a heating pad.  - Sore Throat: treat with throat lozenges and/or gargle with warm salt   water.  - Because air was used during the procedure, expelling large amounts of air   from your rectum or belching is normal.  - If a bowel prep was taken, you may not have a bowel movement for 1-3 days.    This is normal.  SYMPTOMS TO WATCH FOR AND REPORT TO YOUR PHYSICIAN:  1. Abdominal pain or bloating, other than gas cramps.  2. Chest pain.  3. Back pain.  4. Signs of infection such as: chills or fever occurring within 24 hours   after the procedure.  5. Rectal bleeding, which would show as bright red, maroon, or black stools.   (A tablespoon of blood from the rectum is not serious, especially if    hemorrhoids are present.)  6. Vomiting.  7. Weakness or dizziness.  GO DIRECTLY TO THE NEAREST EMERGENCY ROOM IF YOU HAVE ANY OF THE FOLLOWING:      Difficulty breathing              Chills and/or fever over 101 F   Persistent vomiting and/or vomiting blood   Severe abdominal pain   Severe chest pain   Black, tarry stools   Bleeding- more than one tablespoon   Any other symptom or condition that you feel may need urgent attention  Your doctor recommends these additional instructions:  If any biopsies were taken, your doctors clinic will contact you in 1 to 2   weeks with any results.  - Discharge patient to home (ambulatory).   - Patient has a contact number available for emergencies.  The signs and   symptoms of potential delayed complications were discussed with the   patient.  Return to normal activities tomorrow.  Written discharge   instructions were provided to the patient.   - Resume previous diet.   - Continue present medications.   - Return to primary care physician as previously scheduled.   - Repeat colonoscopy in 5 years for surveillance.  For questions, problems or results please call your physician - Sky Paredes MD at Work:  [phone].  [facility] can be reached at [phone]     IF A COMPLICATION OR EMERGENCY SITUATION ARISES AND YOU ARE UNABLE TO REACH   YOUR PHYSICIAN - GO DIRECTLY TO THE EMERGENCY ROOM.  Sky Paredes MD  10/12/2023 10:29:21 AM  This report has been verified and signed electronically.  Dear patient,  As a result of recent federal legislation (The Federal Cures Act), you may   receive lab or pathology results from your procedure in your [facility]   account before your physician is able to contact you. Your physician or   their representative will relay the results to you with their   recommendations at their soonest availability.  Thank you,  [employer]OVATION

## 2023-10-23 ENCOUNTER — OFFICE VISIT (OUTPATIENT)
Dept: INTERNAL MEDICINE | Facility: CLINIC | Age: 68
End: 2023-10-23
Payer: MEDICARE

## 2023-10-23 DIAGNOSIS — R14.0 BLOATING: Primary | ICD-10-CM

## 2023-10-23 DIAGNOSIS — E06.3 HYPOTHYROIDISM DUE TO HASHIMOTO'S THYROIDITIS: Chronic | ICD-10-CM

## 2023-10-23 DIAGNOSIS — K64.8 INTERNAL HEMORRHOIDS: ICD-10-CM

## 2023-10-23 DIAGNOSIS — E03.8 HYPOTHYROIDISM DUE TO HASHIMOTO'S THYROIDITIS: Chronic | ICD-10-CM

## 2023-10-23 PROCEDURE — 99214 PR OFFICE/OUTPT VISIT, EST, LEVL IV, 30-39 MIN: ICD-10-PCS | Mod: 95,,, | Performed by: INTERNAL MEDICINE

## 2023-10-23 PROCEDURE — 3288F FALL RISK ASSESSMENT DOCD: CPT | Mod: CPTII,95,, | Performed by: INTERNAL MEDICINE

## 2023-10-23 PROCEDURE — 1125F PR PAIN SEVERITY QUANTIFIED, PAIN PRESENT: ICD-10-PCS | Mod: CPTII,95,, | Performed by: INTERNAL MEDICINE

## 2023-10-23 PROCEDURE — 1159F PR MEDICATION LIST DOCUMENTED IN MEDICAL RECORD: ICD-10-PCS | Mod: CPTII,95,, | Performed by: INTERNAL MEDICINE

## 2023-10-23 PROCEDURE — 1101F PT FALLS ASSESS-DOCD LE1/YR: CPT | Mod: CPTII,95,, | Performed by: INTERNAL MEDICINE

## 2023-10-23 PROCEDURE — 4010F ACE/ARB THERAPY RXD/TAKEN: CPT | Mod: CPTII,95,, | Performed by: INTERNAL MEDICINE

## 2023-10-23 PROCEDURE — 3288F PR FALLS RISK ASSESSMENT DOCUMENTED: ICD-10-PCS | Mod: CPTII,95,, | Performed by: INTERNAL MEDICINE

## 2023-10-23 PROCEDURE — 1159F MED LIST DOCD IN RCRD: CPT | Mod: CPTII,95,, | Performed by: INTERNAL MEDICINE

## 2023-10-23 PROCEDURE — 1160F RVW MEDS BY RX/DR IN RCRD: CPT | Mod: CPTII,95,, | Performed by: INTERNAL MEDICINE

## 2023-10-23 PROCEDURE — 1160F PR REVIEW ALL MEDS BY PRESCRIBER/CLIN PHARMACIST DOCUMENTED: ICD-10-PCS | Mod: CPTII,95,, | Performed by: INTERNAL MEDICINE

## 2023-10-23 PROCEDURE — 1101F PR PT FALLS ASSESS DOC 0-1 FALLS W/OUT INJ PAST YR: ICD-10-PCS | Mod: CPTII,95,, | Performed by: INTERNAL MEDICINE

## 2023-10-23 PROCEDURE — 4010F PR ACE/ARB THEARPY RXD/TAKEN: ICD-10-PCS | Mod: CPTII,95,, | Performed by: INTERNAL MEDICINE

## 2023-10-23 PROCEDURE — 1125F AMNT PAIN NOTED PAIN PRSNT: CPT | Mod: CPTII,95,, | Performed by: INTERNAL MEDICINE

## 2023-10-23 PROCEDURE — 99214 OFFICE O/P EST MOD 30 MIN: CPT | Mod: 95,,, | Performed by: INTERNAL MEDICINE

## 2023-10-23 RX ORDER — SENNOSIDES 8.6 MG/1
2 TABLET ORAL DAILY PRN
Qty: 30 TABLET | Refills: 0 | Status: SHIPPED | OUTPATIENT
Start: 2023-10-23 | End: 2024-01-26 | Stop reason: ALTCHOICE

## 2023-10-23 RX ORDER — DOCUSATE SODIUM 100 MG/1
100 CAPSULE, LIQUID FILLED ORAL 2 TIMES DAILY PRN
Qty: 30 CAPSULE | Refills: 3
Start: 2023-10-23 | End: 2024-01-26 | Stop reason: ALTCHOICE

## 2023-10-23 NOTE — PATIENT INSTRUCTIONS
High fiber diet - 25 grams per day. Emphasis on whole grain breads such as double fiber wheat bread and high fiber cereals and bars.   Drink 8 glasses of water per day 64 - 96 oz per day  Fiber supplements such as KONSYL, METAMUCIL can be taken 1-2 x per day  Regular exercise - 30 min brisk walking 5 days per week  Stool softener such as colace 1 tablet twice a day. If works too well then can take 1 tablet a day or every other day.     All of your core healthy metrics are met.      Mu Garcia,     If you are due for any health screening(s) below please notify me so we can arrange them to be ordered and scheduled to maintain your health. Most healthy patients complete it. Don't lose out on improving your health.     All of your core healthy metrics are met.

## 2023-10-23 NOTE — PROGRESS NOTES
The patient location is: [place]  The chief complaint leading to consultation is: Bloated (Had colonoscopy 1 week ago)    Visit type: Virtual visit with synchronous audio and video  Contact time spent with patient: 17 minutes  Each patient to whom he or she provides medical services by telemedicine is:  (1) informed of the relationship between the physician and patient and the respective role of any other health care provider with respect to management of the patient; and (2) notified that he or she may decline to receive medical services by telemedicine and may withdraw from such care at any time.    Subjective:       Patient ID: Radha Fraga is a 68 y.o. female who  has a past medical history of Asymptomatic cholelithiasis, Hypothyroidism, Lichen planus, Scoliosis, and Unspecified essential hypertension (3/25/2014).    Chief Complaint: Bloated (Had colonoscopy 1 week ago)     History was obtained from the patient and supplemented through chart review.  She is a patient of Chicho Meléndez MD.  Was last seen  for traveler's diarrhea.      Abdominal Pain  This is a new problem. The current episode started 1 to 4 weeks ago. The onset quality is gradual. The problem occurs constantly. The most recent episode lasted 3 days. The problem has been waxing and waning. The pain is located in the generalized abdominal region. The pain is at a severity of 8/10. The pain is moderate. The quality of the pain is aching and cramping. Associated symptoms include constipation, flatus and myalgias. Pertinent negatives include no anorexia, arthralgias, belching, diarrhea, dysuria, fever, frequency, headaches, hematochezia, hematuria, melena, nausea, vomiting or weight loss. The pain is aggravated by certain positions and movement. The pain is relieved by Bowel movements, certain positions and passing flatus. She has tried antacids for the symptoms. The treatment provided mild relief. Prior diagnostic workup includes lower  endoscopy. There is no history of abdominal surgery, colon cancer, Crohn's disease, gallstones, GERD, irritable bowel syndrome, pancreatitis, PUD or ulcerative colitis. Patient's medical history does not include kidney stones and UTI.     Constipation, bloating:  Had screening C scope 10/12/2023 at [facility] with small internal hemorrhoids.  Repeat in 5 years for surveillance.  Has had small BMs since then.    Doesn't recall no major issues after her last cscope, but might have had constipation at the time.  Normally no constipation, straining.    Constipation, bloating worsened 2 days ago.  Has mid abd aching below the umbilicus.    She thought it was a muscle ache/pull since she recently bathed her dog.  Her dog is large.  Has abdominal aching with twisting movements.    Sx persisted.  Tried GasX, Tums, Pepto bismol, drank prune juice. Mild relief.  Has old prescription of docusate.    Constipation, straining, hard palate for the last 2 days.  Has 2-3 BMs/day.  Feels better when she passes gas or has a BM.  No rectal pain, N/V.  No diarrhea.  No hematochezia, melena.    No fever.  Normal appetite, p.o. intake. Admits to decreased hydration.  No food triggers.  Eats good amount of veggies.    Never occurred before.    Denies dysuria, hematuria, urinary odor.    Hypothyroidism:    On Synthroid 75.  Last TSH borderline high. No recent dose changes.  PCP was planning to adjust dose if TSH continues to be abnormal the next lab check.  Has repeat lab.  Lab Results   Component Value Date    TSH 5.595 (H) 07/26/2023    FREET4 0.91 07/26/2023     Review of Systems   Constitutional:  Negative for fever and weight loss.   Gastrointestinal:  Positive for abdominal pain, constipation and flatus. Negative for anorexia, diarrhea, hematochezia, melena, nausea and vomiting.   Genitourinary:  Negative for dysuria, frequency and hematuria.   Musculoskeletal:  Positive for myalgias. Negative for arthralgias.   Neurological:  Negative  for headaches.       Past Medical History:   Diagnosis Date    Asymptomatic cholelithiasis     Hypothyroidism     Lichen planus     Scoliosis     Unspecified essential hypertension 3/25/2014     Past Surgical History:   Procedure Laterality Date    COLONOSCOPY      normal.    COLONOSCOPY N/A 2018    Procedure: COLONOSCOPY;  Surgeon: Tom Gómez MD;  Location: [facility] ENDO;  Service: Endoscopy;  Laterality: N/A;    COLONOSCOPY N/A 10/12/2023    Procedure: COLONOSCOPY;  Surgeon: Sky Paredes MD;  Location: [facility] ENDO;  Service: Endoscopy;  Laterality: N/A;  suprep instr. to portal. Referral Dr. 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     Employer: RETIRED   Tobacco Use    Smoking status: Former     Current packs/day: 0.00     Average packs/day: 0.3 packs/day for 15.0 years (3.8 ttl pk-yrs)     Types: Cigarettes     Quit date: 10/12/2004     Years since quittin.0    Smokeless tobacco: Never   Substance and Sexual Activity    Alcohol use: Yes     Alcohol/week: 5.0 standard drinks of alcohol     Types: 5 Glasses of wine per week     Comment: occassionally    Drug use: No    Sexual activity: Yes     Partners: Male     Birth control/protection: Partner-Vasectomy, Post-menopausal     Social Determinants of Health     Financial Resource Strain: Low Risk  (10/23/2023)    Overall Financial Resource Strain (CARDIA)     Difficulty of Paying Living Expenses: Not hard at all   Food Insecurity: No Food Insecurity (10/23/2023)    Hunger Vital Sign     Worried About Running Out of  Food in the Last Year: Never true     Ran Out of Food in the Last Year: Never true   Transportation Needs: No Transportation Needs (10/23/2023)    PRAPARE - Transportation     Lack of Transportation (Medical): No     Lack of Transportation (Non-Medical): No   Physical Activity: Insufficiently Active (10/23/2023)    Exercise Vital Sign     Days of Exercise per Week: 1 day     Minutes of Exercise per Session: 30 min   Stress: No Stress Concern Present (10/23/2023)    [employer] [place] [employer] - Occupational Stress Questionnaire     Feeling of Stress : Only a little   Social Connections: Unknown (10/23/2023)    Social Connection and Isolation Panel [NHANES]     Frequency of Communication with Friends and Family: Three times a week     Frequency of Social Gatherings with Friends and Family: Twice a week     Active Member of Clubs or Organizations: Yes     Attends Club or Organization Meetings: More than 4 times per year     Marital Status:    Housing Stability: Low Risk  (10/23/2023)    Housing Stability Vital Sign     Unable to Pay for Housing in the Last Year: No     Number of Places Lived in the Last Year: 1     Unstable Housing in the Last Year: No     Objective:      There were no vitals filed for this visit.   Physical Exam  Constitutional:       General: She is not in acute distress.     Appearance: She is well-developed. She is not diaphoretic.   HENT:      Head: Normocephalic and atraumatic.   Eyes:      General:         Right eye: No discharge.         Left eye: No discharge.   Pulmonary:      Effort: Pulmonary effort is normal. No respiratory distress.   Skin:     Coloration: Skin is not pale.      Findings: No erythema.   Neurological:      Mental Status: She is alert.   Psychiatric:         Behavior: Behavior normal.         Lab Results   Component Value Date    WBC 4.73 07/26/2023    HGB 13.5 07/26/2023    HCT 42.5 07/26/2023     07/26/2023    CHOL 140 07/26/2023    TRIG 49  07/26/2023    HDL 67 07/26/2023    ALT 16 07/26/2023    AST 21 07/26/2023     07/26/2023    K 4.2 07/26/2023     07/26/2023    CREATININE 0.9 07/26/2023    BUN 17 07/26/2023    CO2 27 07/26/2023    TSH 5.595 (H) 07/26/2023    HGBA1C 5.3 03/02/2018       The 10-year ASCVD risk score (Caitlin NOLASCO, et al., 2019) is: 13.5%    Values used to calculate the score:      Age: 68 years      Sex: Female      Is Non- : No      Diabetic: No      Tobacco smoker: No      Systolic Blood Pressure: 143 mmHg      Is BP treated: Yes      HDL Cholesterol: 67 mg/dL      Total Cholesterol: 140 mg/dL    Assessment:       1. Bloating    2. Internal hemorrhoids    3. Hypothyroidism due to Hashimoto's thyroiditis          Plan:       Radha was seen today for bloated.    Diagnoses and all orders for this visit:    Bloating  Comments:  New. Increase dietary fiber/MiraLax, hydration.  Colace, senna p.r.n..  C scope with hemorrhoids, but otherwise no acute abnormality.  See GI if persistent.  Orders:  -     docusate sodium (COLACE) 100 MG capsule; Take 1 capsule (100 mg total) by mouth 2 (two) times daily as needed for Constipation.  -     SENNA 8.6 mg tablet; Take 2 tablets by mouth daily as needed for Constipation.  -     Ambulatory referral/consult to Gastroenterology; Future    Internal hemorrhoids  Comments:  As above.  Orders:  -     docusate sodium (COLACE) 100 MG capsule; Take 1 capsule (100 mg total) by mouth 2 (two) times daily as needed for Constipation.  -     SENNA 8.6 mg tablet; Take 2 tablets by mouth daily as needed for Constipation.  -     Ambulatory referral/consult to Gastroenterology; Future    Hypothyroidism due to Hashimoto's thyroiditis  Comments:  Possibly contributing to constipation. Last TSH borderline high. On Synthroid 75.  PCP monitoring; will consider dose adjustment if persistently abnormal.         Side effects of medication(s) were discussed in detail and patient voiced  understanding.  Patient will call back for any issues or complications.     RTC PRN.

## 2023-10-25 ENCOUNTER — CLINICAL SUPPORT (OUTPATIENT)
Dept: REHABILITATION | Facility: HOSPITAL | Age: 68
End: 2023-10-25
Payer: MEDICARE

## 2023-10-25 DIAGNOSIS — M25.511 ACUTE PAIN OF RIGHT SHOULDER: Primary | ICD-10-CM

## 2023-10-25 PROCEDURE — 97530 THERAPEUTIC ACTIVITIES: CPT

## 2023-10-25 NOTE — PROGRESS NOTES
Physical Therapy Daily Treatment Note     Name: Radha Fraga  Clinic Number: [id number]    Therapy Diagnosis:   Encounter Diagnosis   Name Primary?    Acute pain of right shoulder Yes     Physician: Bev Valenzuela MD    Visit Date: 10/25/2023    Physician Orders: PT Eval and Treat  Medical Diagnosis from Referral: M25.511 (ICD-10-CM) - Acute pain of right shoulder; M54.2 (ICD-10-CM) - Neck pain  Evaluation Date: 7/17/2023  Authorization Period Expiration: 12/31/2023  Plan of Care Expiration: 10/17/2023   Visit # / Visits authorized: 8/20     FOTO: 45  FOTO 1st Follow-up: NA  FOTO 2nd Follow-up: NA     Time In: 0900  Time Out: 1000  Total Billable Time: 38 minutes     Precautions: Standard    Subjective     Pt reports no issues since last session. Has been able to paint without discomfort.  She was compliant with home exercise program.  Response to previous treatment: Good  Functional change: improved thoracic    Pain: 3/10  Location: left shoulder      Objective     Active Range of Motion:   Shoulder Right Left   Flexion 175 172*   Abduction 170 165*   ER at 0 45 40   ER at 90 40 30*   IR full full   * = pain      Treatment     Radha received the treatments listed below:       Therapeutic exercises to develop strength, endurance, ROM, posture, and core stabilization for 0 minutes including:  UBE completed for 8' to increase ROM, endurance, and decrease pain to improve tolerance to ADLs and age-related activities  Cat Camel  Open Books  Table T-sp Flexion     Manual therapy techniques: Joint mobilizations, Manual traction, and Soft tissue Mobilization were applied to the: c-sp, B shoulder, and t-sp for 0 minutes, including:  Upper t-sp P/A Mobs  C-sp distraction and med/lat mobs  B shoulder GHJ A/P Mobs     Neuromuscular re-education activities to improve: Balance, Coordination, Kinesthetic, Proprioception, and Posture for 0 minutes. The following activities were included:  Prone T, Y, W  Serratus pushes on  table  Prone swimmers 3 x 10 ea  Prone upper back extension with UE ext 2 x 15 ea    NP:  Wall Walks/Clocks YTB  Rows 45 degree  IR/ER RTB/GTB  Slot Machines 3# 3 x 15    NP:  No_money      Therapeutic activities to improve functional performance for 38 minutes, including:  UBE completed for 10' to increase ROM, endurance, and decrease pain to improve tolerance to ADLs and age-related activities  Prone full swimmers 3 x 8  Plank Shoulder Taps 2 x 15  Serratus Wall slide RTB 3 x 15  Standing Rows RTB 3 x 12     Home Exercises and Patient Education Provided      Education provided:   - t-sp mobility and cuff strengthening  - Prognosis, activity modification, goals for therapy, role of therapy for care, exercises/HEP     Written Home Exercises Provided:  Exercises were reviewed and Radha was able to demonstrate them prior to the end of the session.   Pt received a written copy of exercises to perform at home. Radha demonstrated good understanding of the education provided.      See EMR under patient instructions for exercises given.     Assessment     B RC weakness and stiff upper back  Reviewed and updated HEP for understanding. Will transition to maintenance and self-care. Scheduled for f/u visit in 2 weeks with goal for full functional return. Able to resume painting and lifting activities over the past week.  Radha Is progressing well towards her goals.   Pt prognosis is Good.     Pt will continue to benefit from skilled outpatient physical therapy to address the deficits listed in the problem list box on initial evaluation, provide pt/family education and to maximize pt's level of independence in the home and community environment.     Pt's spiritual, cultural and educational needs considered and pt agreeable to plan of care and goals.    Anticipated barriers to physical therapy: Age    GOALS   Short Term Goals: 4 weeks  Pt will report decreased B shoulder pain  < / =  3/10  to increase tolerance for ADL  performance and recreational routine. MET  Pt will improve B shoulder ROM to WFL in order to be able to perform ADLs without difficulty. MET  Pt will improve B RC strength by 1/3 MMT grade to increase tolerance for ADL and work activities MET  Pt will demonstrate tolerance to HEP to improve independence with ADL's MET     Long Term Goals: 8 weeks  Pt will report decreased B shoulder pain  < / =  1/10  to increase tolerance for ADL performance and recreational routine  Pt will improve B shoulder ROM to WNL in order to be able to perform ADLs without difficulty  Pt will improve B RC strength by 1/3 MMT grade to increase tolerance for ADL and work activities  Pt will report at CJ level (20-40% impaired) on FOTO Shoulder to demonstrate increase in LE function with every day tasks.     Plan     Continue per ANJEL Heart, PT, DPT  Board Certified in Orthopedic Physical Therapy

## 2023-11-08 ENCOUNTER — CLINICAL SUPPORT (OUTPATIENT)
Dept: REHABILITATION | Facility: HOSPITAL | Age: 68
End: 2023-11-08
Payer: MEDICARE

## 2023-11-08 DIAGNOSIS — M25.511 ACUTE PAIN OF RIGHT SHOULDER: Primary | ICD-10-CM

## 2023-11-08 PROCEDURE — 97530 THERAPEUTIC ACTIVITIES: CPT

## 2023-11-08 NOTE — PROGRESS NOTES
Physical Therapy Daily Treatment Note     Name: Radha Fraga  Clinic Number: [id number]    Therapy Diagnosis:   Encounter Diagnosis   Name Primary?    Acute pain of right shoulder Yes     Physician: Bev Valenzuela MD    Visit Date: 11/8/2023    Physician Orders: PT Eval and Treat  Medical Diagnosis from Referral: M25.511 (ICD-10-CM) - Acute pain of right shoulder; M54.2 (ICD-10-CM) - Neck pain  Evaluation Date: 7/17/2023  Authorization Period Expiration: 12/31/2023  Plan of Care Expiration: 10/17/2023   Visit # / Visits authorized: 9/20     FOTO: 45  FOTO 1st Follow-up: NA  FOTO 2nd Follow-up: NA     Time In: 0900  Time Out: 1000  Total Billable Time: 60 minutes     Precautions: Standard    Subjective     Pt reports no issues since last session.  She was compliant with home exercise program.  Response to previous treatment: Good  Functional change: improved thoracic    Pain: 3/10  Location: left shoulder      Objective     Active Range of Motion:   Shoulder Right Left   Flexion 175 175   Abduction 170 165   ER at 0 45 40   ER at 90 40 40   IR full full   * = pain      Treatment     Radha received the treatments listed below:       Therapeutic activities to improve functional performance for 60 minutes, including:  UBE completed for 10' to increase ROM, endurance, and decrease pain to improve tolerance to ADLs and age-related activities  Prone full swimmers 3 x 8  Plank Shoulder Taps 2 x 15  Serratus Wall slide RTB 3 x 15  Standing Rows RTB 3 x 12     Home Exercises and Patient Education Provided      Education provided:   - t-sp mobility and cuff strengthening  - Prognosis, activity modification, goals for therapy, role of therapy for care, exercises/HEP     Written Home Exercises Provided:  Exercises were reviewed and Radha was able to demonstrate them prior to the end of the session.   Pt received a written copy of exercises to perform at home. Radha demonstrated good understanding of the education provided.       See EMR under patient instructions for exercises given.     Assessment     B RC weakness and stiff upper back  Reports absent sx behavior outside of the clinic. Will transition to maintenance program with performance of HEP outside of the clinic. Appropriate for D/C to self-care.  Radha Is progressing well towards her goals.   Pt prognosis is Good.     Pt will continue to benefit from skilled outpatient physical therapy to address the deficits listed in the problem list box on initial evaluation, provide pt/family education and to maximize pt's level of independence in the home and community environment.     Pt's spiritual, cultural and educational needs considered and pt agreeable to plan of care and goals.    Anticipated barriers to physical therapy: Age    GOALS   Short Term Goals: 4 weeks  Pt will report decreased B shoulder pain  < / =  3/10  to increase tolerance for ADL performance and recreational routine. MET  Pt will improve B shoulder ROM to WFL in order to be able to perform ADLs without difficulty. MET  Pt will improve B RC strength by 1/3 MMT grade to increase tolerance for ADL and work activities MET  Pt will demonstrate tolerance to HEP to improve independence with ADL's MET     Long Term Goals: 8 weeks  Pt will report decreased B shoulder pain  < / =  1/10  to increase tolerance for ADL performance and recreational routine MET  Pt will improve B shoulder ROM to WNL in order to be able to perform ADLs without difficulty MET  Pt will improve B RC strength by 1/3 MMT grade to increase tolerance for ADL and work activities MET  Pt will report at CJ level (20-40% impaired) on FOTO Shoulder to demonstrate increase in LE function with every day tasks.  MET    Plan     D/C to self-care    [place] Heart, PT, DPT  Board Certified in Orthopedic Physical Therapy

## 2023-12-04 ENCOUNTER — OFFICE VISIT (OUTPATIENT)
Dept: DERMATOLOGY | Facility: CLINIC | Age: 68
End: 2023-12-04
Payer: MEDICARE

## 2023-12-04 DIAGNOSIS — L30.9 DERMATITIS: ICD-10-CM

## 2023-12-04 DIAGNOSIS — L21.9 SEBORRHEIC DERMATITIS: Primary | ICD-10-CM

## 2023-12-04 PROCEDURE — 1101F PR PT FALLS ASSESS DOC 0-1 FALLS W/OUT INJ PAST YR: ICD-10-PCS | Mod: CPTII,S$GLB,, | Performed by: STUDENT IN AN ORGANIZED HEALTH CARE EDUCATION/TRAINING PROGRAM

## 2023-12-04 PROCEDURE — 1159F MED LIST DOCD IN RCRD: CPT | Mod: CPTII,S$GLB,, | Performed by: STUDENT IN AN ORGANIZED HEALTH CARE EDUCATION/TRAINING PROGRAM

## 2023-12-04 PROCEDURE — 1126F PR PAIN SEVERITY QUANTIFIED, NO PAIN PRESENT: ICD-10-PCS | Mod: CPTII,S$GLB,, | Performed by: STUDENT IN AN ORGANIZED HEALTH CARE EDUCATION/TRAINING PROGRAM

## 2023-12-04 PROCEDURE — 1159F PR MEDICATION LIST DOCUMENTED IN MEDICAL RECORD: ICD-10-PCS | Mod: CPTII,S$GLB,, | Performed by: STUDENT IN AN ORGANIZED HEALTH CARE EDUCATION/TRAINING PROGRAM

## 2023-12-04 PROCEDURE — 4010F PR ACE/ARB THEARPY RXD/TAKEN: ICD-10-PCS | Mod: CPTII,S$GLB,, | Performed by: STUDENT IN AN ORGANIZED HEALTH CARE EDUCATION/TRAINING PROGRAM

## 2023-12-04 PROCEDURE — 1160F RVW MEDS BY RX/DR IN RCRD: CPT | Mod: CPTII,S$GLB,, | Performed by: STUDENT IN AN ORGANIZED HEALTH CARE EDUCATION/TRAINING PROGRAM

## 2023-12-04 PROCEDURE — 99999 PR PBB SHADOW E&M-EST. PATIENT-LVL III: CPT | Mod: PBBFAC,,, | Performed by: STUDENT IN AN ORGANIZED HEALTH CARE EDUCATION/TRAINING PROGRAM

## 2023-12-04 PROCEDURE — 99214 OFFICE O/P EST MOD 30 MIN: CPT | Mod: S$GLB,,, | Performed by: STUDENT IN AN ORGANIZED HEALTH CARE EDUCATION/TRAINING PROGRAM

## 2023-12-04 PROCEDURE — 3288F FALL RISK ASSESSMENT DOCD: CPT | Mod: CPTII,S$GLB,, | Performed by: STUDENT IN AN ORGANIZED HEALTH CARE EDUCATION/TRAINING PROGRAM

## 2023-12-04 PROCEDURE — 1101F PT FALLS ASSESS-DOCD LE1/YR: CPT | Mod: CPTII,S$GLB,, | Performed by: STUDENT IN AN ORGANIZED HEALTH CARE EDUCATION/TRAINING PROGRAM

## 2023-12-04 PROCEDURE — 3288F PR FALLS RISK ASSESSMENT DOCUMENTED: ICD-10-PCS | Mod: CPTII,S$GLB,, | Performed by: STUDENT IN AN ORGANIZED HEALTH CARE EDUCATION/TRAINING PROGRAM

## 2023-12-04 PROCEDURE — 99214 PR OFFICE/OUTPT VISIT, EST, LEVL IV, 30-39 MIN: ICD-10-PCS | Mod: S$GLB,,, | Performed by: STUDENT IN AN ORGANIZED HEALTH CARE EDUCATION/TRAINING PROGRAM

## 2023-12-04 PROCEDURE — 1126F AMNT PAIN NOTED NONE PRSNT: CPT | Mod: CPTII,S$GLB,, | Performed by: STUDENT IN AN ORGANIZED HEALTH CARE EDUCATION/TRAINING PROGRAM

## 2023-12-04 PROCEDURE — 99999 PR PBB SHADOW E&M-EST. PATIENT-LVL III: ICD-10-PCS | Mod: PBBFAC,,, | Performed by: STUDENT IN AN ORGANIZED HEALTH CARE EDUCATION/TRAINING PROGRAM

## 2023-12-04 PROCEDURE — 1160F PR REVIEW ALL MEDS BY PRESCRIBER/CLIN PHARMACIST DOCUMENTED: ICD-10-PCS | Mod: CPTII,S$GLB,, | Performed by: STUDENT IN AN ORGANIZED HEALTH CARE EDUCATION/TRAINING PROGRAM

## 2023-12-04 PROCEDURE — 4010F ACE/ARB THERAPY RXD/TAKEN: CPT | Mod: CPTII,S$GLB,, | Performed by: STUDENT IN AN ORGANIZED HEALTH CARE EDUCATION/TRAINING PROGRAM

## 2023-12-04 RX ORDER — TACROLIMUS 1 MG/G
OINTMENT TOPICAL 2 TIMES DAILY
Qty: 60 G | Refills: 2 | Status: SHIPPED | OUTPATIENT
Start: 2023-12-04

## 2023-12-04 RX ORDER — CLOBETASOL PROPIONATE 0.46 MG/ML
SOLUTION TOPICAL DAILY
Qty: 50 ML | Refills: 2 | Status: SHIPPED | OUTPATIENT
Start: 2023-12-04

## 2023-12-04 RX ORDER — TRIAMCINOLONE ACETONIDE 0.25 MG/G
CREAM TOPICAL 2 TIMES DAILY
Qty: 80 G | Refills: 2 | Status: SHIPPED | OUTPATIENT
Start: 2023-12-04

## 2023-12-04 RX ORDER — TACROLIMUS 1 MG/G
OINTMENT TOPICAL
Qty: 60 G | Refills: 2 | OUTPATIENT
Start: 2023-12-04

## 2023-12-04 RX ORDER — KETOCONAZOLE 20 MG/ML
SHAMPOO, SUSPENSION TOPICAL
Qty: 120 ML | Refills: 10 | Status: SHIPPED | OUTPATIENT
Start: 2023-12-04

## 2023-12-04 NOTE — PROGRESS NOTES
Subjective:      Patient ID:  Radha Fraga is a 68 y.o. female who presents for   Chief Complaint   Patient presents with    Itching     scalp     Itching - Initial  Affected locations: scalp  Duration: 6 months  Signs / symptoms: itching and scaling  Severity: mild to moderate  Timing: constant  Exacerbated by: hair products.  Treatments tried: head and shoulders.  Improvement on treatment: no relief    Review of Systems   Skin:  Positive for itching.       Objective:   Physical Exam   Constitutional: She appears well-developed and well-nourished. No distress.   Neurological: She is alert and oriented to person, place, and time. She is not disoriented.   Psychiatric: She has a normal mood and affect.   Skin:   Areas Examined (abnormalities noted in diagram):   Head / Face Inspection Performed  Chest / Axilla Inspection Performed  Back Inspection Performed                     Diagram Legend     Erythematous scaling macule/papule c/w actinic keratosis       Vascular papule c/w angioma      Pigmented verrucoid papule/plaque c/w seborrheic keratosis      Yellow umbilicated papule c/w sebaceous hyperplasia      Irregularly shaped tan macule c/w lentigo     1-2 mm smooth white papules consistent with Milia      Movable subcutaneous cyst with punctum c/w epidermal inclusion cyst      Subcutaneous movable cyst c/w pilar cyst      Firm pink to brown papule c/w dermatofibroma      Pedunculated fleshy papule(s) c/w skin tag(s)      Evenly pigmented macule c/w junctional nevus     Mildly variegated pigmented, slightly irregular-bordered macule c/w mildly atypical nevus      Flesh colored to evenly pigmented papule c/w intradermal nevus       Pink pearly papule/plaque c/w basal cell carcinoma      Erythematous hyperkeratotic cursted plaque c/w SCC      Surgical scar with no sign of skin cancer recurrence      Open and closed comedones      Inflammatory papules and pustules      Verrucoid papule consistent consistent with  wart     Erythematous eczematous patches and plaques     Dystrophic onycholytic nail with subungual debris c/w onychomycosis     Umbilicated papule    Erythematous-base heme-crusted tan verrucoid plaque consistent with inflamed seborrheic keratosis     Erythematous Silvery Scaling Plaque c/w Psoriasis     See annotation      Assessment / Plan:        Seborrheic dermatitis--scalp  -     ketoconazole (NIZORAL) 2 % shampoo; Apply topically 3 (three) times a week. Use to wash scalp. Leave on for 5-10 minutes then wash off  Dispense: 120 mL; Refill: 10  -     clobetasoL (TEMOVATE) 0.05 % external solution; Apply topically once daily. Use to affected areas for up to 2 weeks then take a 1 week break or decrease to 3 times weekly. Do not apply to groin or face. Use to scalp  Dispense: 50 mL; Refill: 2    Dermatitis--dermatits on upper back and armpits. ACD vs AD vs other  - switch to vanicream deodorant  - could consider patch testing if not improving  - protopic as below. If not covered by insurance then could switch to TAC 0.025%  -     tacrolimus (PROTOPIC) 0.1 % ointment; Apply topically 2 (two) times daily. Use to itchy rash in armpits and on back  Dispense: 60 g; Refill: 2             Follow up if symptoms worsen or fail to improve.

## 2024-01-19 ENCOUNTER — LAB VISIT (OUTPATIENT)
Dept: LAB | Facility: HOSPITAL | Age: 69
End: 2024-01-19
Attending: INTERNAL MEDICINE
Payer: MEDICARE

## 2024-01-19 DIAGNOSIS — E78.5 DYSLIPIDEMIA: ICD-10-CM

## 2024-01-19 DIAGNOSIS — E06.3 HYPOTHYROIDISM DUE TO HASHIMOTO'S THYROIDITIS: Chronic | ICD-10-CM

## 2024-01-19 DIAGNOSIS — E03.8 HYPOTHYROIDISM DUE TO HASHIMOTO'S THYROIDITIS: Chronic | ICD-10-CM

## 2024-01-19 DIAGNOSIS — I10 ESSENTIAL HYPERTENSION: ICD-10-CM

## 2024-01-19 DIAGNOSIS — R79.9 ABNORMAL FINDING OF BLOOD CHEMISTRY: ICD-10-CM

## 2024-01-19 DIAGNOSIS — Z00.00 NORMAL PHYSICAL EXAM: ICD-10-CM

## 2024-01-19 LAB
ALBUMIN SERPL BCP-MCNC: 4 G/DL (ref 3.5–5.2)
ALP SERPL-CCNC: 70 U/L (ref 55–135)
ALT SERPL W/O P-5'-P-CCNC: 20 U/L (ref 10–44)
ANION GAP SERPL CALC-SCNC: 9 MMOL/L (ref 8–16)
AST SERPL-CCNC: 20 U/L (ref 10–40)
BILIRUB SERPL-MCNC: 0.7 MG/DL (ref 0.1–1)
BUN SERPL-MCNC: 20 MG/DL (ref 8–23)
CALCIUM SERPL-MCNC: 10 MG/DL (ref 8.7–10.5)
CHLORIDE SERPL-SCNC: 108 MMOL/L (ref 95–110)
CHOLEST SERPL-MCNC: 161 MG/DL (ref 120–199)
CHOLEST/HDLC SERPL: 2.7 {RATIO} (ref 2–5)
CO2 SERPL-SCNC: 27 MMOL/L (ref 23–29)
CREAT SERPL-MCNC: 0.9 MG/DL (ref 0.5–1.4)
EST. GFR  (NO RACE VARIABLE): >60 ML/MIN/1.73 M^2
ESTIMATED AVG GLUCOSE: 108 MG/DL (ref 68–131)
GLUCOSE SERPL-MCNC: 83 MG/DL (ref 70–110)
HBA1C MFR BLD: 5.4 % (ref 4–5.6)
HDLC SERPL-MCNC: 60 MG/DL (ref 40–75)
HDLC SERPL: 37.3 % (ref 20–50)
LDLC SERPL CALC-MCNC: 86.8 MG/DL (ref 63–159)
NONHDLC SERPL-MCNC: 101 MG/DL
POTASSIUM SERPL-SCNC: 4.8 MMOL/L (ref 3.5–5.1)
PROT SERPL-MCNC: 7.1 G/DL (ref 6–8.4)
SODIUM SERPL-SCNC: 144 MMOL/L (ref 136–145)
TRIGL SERPL-MCNC: 71 MG/DL (ref 30–150)
TSH SERPL DL<=0.005 MIU/L-ACNC: 2.77 UIU/ML (ref 0.4–4)

## 2024-01-19 PROCEDURE — 36415 COLL VENOUS BLD VENIPUNCTURE: CPT | Mod: PO | Performed by: INTERNAL MEDICINE

## 2024-01-19 PROCEDURE — 80061 LIPID PANEL: CPT | Performed by: INTERNAL MEDICINE

## 2024-01-19 PROCEDURE — 83036 HEMOGLOBIN GLYCOSYLATED A1C: CPT | Performed by: INTERNAL MEDICINE

## 2024-01-19 PROCEDURE — 84443 ASSAY THYROID STIM HORMONE: CPT | Performed by: INTERNAL MEDICINE

## 2024-01-19 PROCEDURE — 80053 COMPREHEN METABOLIC PANEL: CPT | Performed by: INTERNAL MEDICINE

## 2024-01-25 PROBLEM — Z86.16 HISTORY OF COVID-19: Status: ACTIVE | Noted: 2021-02-03

## 2024-01-25 NOTE — PROGRESS NOTES
This note was created by combination of typed  and M-Modal dictation.  Transcription errors may be present.  If there are any questions, please contact me.    Assessment and Plan:   Assessment and Plan    Normal physical exam  Tubular adenoma of colon 2013 and 2018  Encounter for screening mammogram for malignant neoplasm of breast  -pre visit labs reviewed  10/12/2023 colonoscopy internal hemorrhoids.  Repeat 5 years  Plans to join a gym  -     CBC Without Differential; Future; Expected date: 01/25/2025  -     Comprehensive Metabolic Panel; Future; Expected date: 01/25/2025  -     Lipid Panel; Future; Expected date: 01/25/2025  -     TSH; Future; Expected date: 01/25/2025  -     Mammo Digital Screening Bilat w/ Raul; Future; Expected date: 02/10/2024      Essential hypertension  -BP stable no changes. Refilled losartan.  -     losartan (COZAAR) 25 MG tablet; Take 1 tablet (25 mg total) by mouth once daily.  Dispense: 90 tablet; Refill: 3    Dyslipidemia  -pre visit labs good on low dose statin, no changes.   -     CBC Without Differential; Future; Expected date: 01/25/2025  -     Comprehensive Metabolic Panel; Future; Expected date: 01/25/2025  -     Lipid Panel; Future; Expected date: 01/25/2025  -     atorvastatin (LIPITOR) 10 MG tablet; Take 1 tablet (10 mg total) by mouth once daily.  Dispense: 90 tablet; Refill: 3    Hypothyroidism due to Hashimoto's thyroiditis  -pre visit labs good on current dose LT. No changes.   -     TSH; Future; Expected date: 01/25/2025  -     levothyroxine (SYNTHROID) 75 MCG tablet; Take 1 tablet (75 mcg total) by mouth before breakfast.  Dispense: 90 tablet; Refill: 3    Asymptomatic cholelithiasis  -stable.    Chronic midline low back pain without sciatica  -stable.    Osteopenia of multiple sites  -repeat DEXA 2025    Needs flu shot  -     Influenza (FLUAD) - Quadrivalent (Adjuvanted) *Preferred* (65+) (PF)             Medications Discontinued During This Encounter    Medication Reason    SENNA 8.6 mg tablet Alternate therapy    docusate sodium (COLACE) 100 MG capsule Alternate therapy    losartan (COZAAR) 25 MG tablet Reorder    levothyroxine (SYNTHROID) 75 MCG tablet Reorder    atorvastatin (LIPITOR) 10 MG tablet Reorder       meds sent this encounter:  Medications Ordered This Encounter   Medications    atorvastatin (LIPITOR) 10 MG tablet     Sig: Take 1 tablet (10 mg total) by mouth once daily.     Dispense:  90 tablet     Refill:  3     Please send a replace/new response with 90-Day Supply if appropriate to maximize member benefit. Requesting 1 year supply. Pharmacy update refills, keep on file, not requesting Rx to be filled today.    levothyroxine (SYNTHROID) 75 MCG tablet     Sig: Take 1 tablet (75 mcg total) by mouth before breakfast.     Dispense:  90 tablet     Refill:  3     Pharmacy update refills, keep on file, not requesting Rx to be filled today.    losartan (COZAAR) 25 MG tablet     Sig: Take 1 tablet (25 mg total) by mouth once daily.     Dispense:  90 tablet     Refill:  3     . Pharmacy update refills, keep on file, not requesting Rx to be filled today.         Follow Up: Physical examination 1 year with labs.  Future Appointments   Date Time Provider Department Center   2024 11:00 AM Donovan Martines MD [facility] GASTRO [facility]   2024  9:30 AM [facility] OPHTHAL [place]   2024  9:45 AM Josue Hurtado MD [facility] OPHTHAL [place]         Subjective:   Subjective   Chief Complaint   Patient presents with    Results       HPI  Radha is a 68 y.o. female.    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 Patient is postmenopausal.    Last appointment with this clinic was 8/25/2023. Last visit with me 8/25/2023   To summarize last visit and events leading up to today:  Hypertension needs to monitor with digital monitoring  Hyperlipidemia 01/2023 started on statin   Hypothyroid on levothyroxine  Migraines  History of TA  Chronic back pain  Osteopenia DEXA scan 2022    10/12/2023 colonoscopy internal hemorrhoids.  Repeat 5 years    Pre visit labs CMP normal   Lipid profile normal   A1c good 5.4   TSH normal    Today's visit:    No issues.  Is interested in joining a gym but feels a bit overwhelmed at the prospect of all the machines there.    Taking medications as prescribed.  May have some muscle soreness but not debilitating.    Taking Benefiber.  Possibly as result she has had some soft less formed stools.  She can try taking half a scoop at a time or maybe every other day.  No gross blood.    Patient Care Team:  Chicho Meléndez MD as PCP - General (Internal Medicine)  Edgard Multani II, MD (Inactive) as Consulting Physician (Endocrinology)  Tatianna Latham MD as Consulting Physician (Physical Medicine and Rehabilitation)  Josh Daily MD (Inactive) as Obstetrician (Obstetrics)  Noé Joyce MA as Care Coordinator  Olive Joe, PharmD as Hypertension Digital Medicine Clinician  Chicho Meléndez MD as Hypertension Digital Medicine Responsible Provider (Internal Medicine)  Lavern Aguirre RN as Digital Medicine Health       Patient Active Problem List    Diagnosis Date Noted    Acute pain of right shoulder 07/17/2023    Dyslipidemia 01/26/2023    Decreased ROM of trunk and back 09/01/2022    Decreased strength of trunk and back 09/01/2022    Osteopenia of  multiple sites 02/10/2022     02/08/2022 DEXA scan L-spine-1.4, total hip-0.2, femoral neck 0.1.  FRAX score 0.5/6.9%.  Osteopenia.  Compared to previous, 5% decline total hip.      Palpitations 02/03/2021    History of COVID-19 02/03/2021    Chronic midline low back pain without sciatica 08/10/2016     8/10/2016 MRI L spine: 1. Significant degenerative disc disease at L5-S1 resulting in mild bilateral neuroforaminal narrowing.  Otherwise mild multilevel lumbar spondylosis. 2. Cholelithiasis.  MRI of T spine showed: 1. Mild degenerative changes of the thoracic spine. No spinal canal stenosis or neuroforaminal narrowing.  Spinal cord signal and morphology are maintained.2.  Cholelithiasis. 3.  Subcentimeter T2 hyperintense lesion at the liver dome incompletely characterized.      Hypothyroidism due to Hashimoto's thyroiditis 06/16/2015    Migraine syndrome 05/19/2015     Symptoms - tightness in the back of the neck and scotomas - really more opthalmic migraine.  5/18/2015 MRI brain neg  5/18/2015 carotid US negative.      Essential hypertension 03/25/2014     2/10/2021 TTE LV normal size with eccentric hypertrophy and normal systolic function LVEF 60%.  Indeterminate diastolic function.  Normal RV size and systolic function.  No pulmonary hypertension.        Tubular adenoma of colon 2013 and 2018; 10/12/2023 colonoscopy internal hemorrhoids.  Repeat 5 years 03/25/2014 1/11/2013 colonoscopy with sigmoid hyperplastic polyp with suggestion of focal early adenomatous change; melanosis coli.  3/23/2018 colonoscopy rectal sessile tubular adenoma  10/12/2023 colonoscopy internal hemorrhoids.  Repeat 5 years      Alopecia areata 03/25/2014    Asymptomatic cholelithiasis 10/13/2012    Lichen planus 10/13/2012    Scoliosis 10/13/2012       PAST MEDICAL PROBLEMS, PAST SURGICAL HISTORY: please see relevant portions of the electronic medical record    ALLERGIES AND MEDICATIONS: updated and reviewed.  Medication List with  Changes/Refills   Current Medications    ATORVASTATIN (LIPITOR) 10 MG TABLET    TAKE 1 TABLET BY MOUTH ONCE  DAILY    CLOBETASOL (TEMOVATE) 0.05 % EXTERNAL SOLUTION    Apply topically once daily. Use to affected areas for up to 2 weeks then take a 1 week break or decrease to 3 times weekly. Do not apply to groin or face. Use to scalp    DOCUSATE SODIUM (COLACE) 100 MG CAPSULE    Take 1 capsule (100 mg total) by mouth 2 (two) times daily as needed for Constipation.    KETOCONAZOLE (NIZORAL) 2 % SHAMPOO    Apply topically 3 (three) times a week. Use to wash scalp. Leave on for 5-10 minutes then wash off    LEVOTHYROXINE (SYNTHROID) 75 MCG TABLET    TAKE 1 TABLET BY MOUTH BEFORE BREAKFAST.    LOSARTAN (COZAAR) 25 MG TABLET    TAKE 1 TABLET BY MOUTH EVERY DAY    SENNA 8.6 MG TABLET    Take 2 tablets by mouth daily as needed for Constipation.    TACROLIMUS (PROTOPIC) 0.1 % OINTMENT    Apply topically 2 (two) times daily. Use to itchy rash in armpits and on back    TRIAMCINOLONE ACETONIDE 0.025% (KENALOG) 0.025 % CREAM    Apply topically 2 (two) times daily. Use to affected areas for up to 2 weeks then take a 1 week break or decrease to 3 times weekly. Do not apply to groin or face. Use for rash on upper back and armpits as needed. Do not use for prolonged periods in armpits as it can cause stretch marks and skin thinning      Review of Systems   Constitutional:  Negative for fever, malaise/fatigue and weight loss.   Eyes:  Negative for blurred vision and pain.   Respiratory:  Negative for shortness of breath and wheezing.    Cardiovascular:  Negative for chest pain, palpitations and leg swelling.   Gastrointestinal:  Negative for abdominal pain, blood in stool, constipation, diarrhea and heartburn.   Genitourinary:  Negative for dysuria.   Neurological:  Negative for tingling.          Objective:   Objective   Physical Exam   Vitals:    01/26/24 1021   Pulse: 68   Temp: 98.2 °F (36.8 °C)   TempSrc: Oral   SpO2: 98%  "  Weight: 79.9 kg (176 lb 2.4 oz)   Height: 5' 9" (1.753 m)    Body mass index is 26.01 kg/m².  Weight: 79.9 kg (176 lb 2.4 oz)   Height: 5' 9" (175.3 cm)     Physical Exam  Constitutional:       Appearance: She is well-developed.   HENT:      Right Ear: Tympanic membrane, ear canal and external ear normal.      Left Ear: Tympanic membrane, ear canal and external ear normal.   Eyes:      General: No scleral icterus.     Extraocular Movements: Extraocular movements intact.      Pupils: Pupils are equal, round, and reactive to light.   Cardiovascular:      Rate and Rhythm: Normal rate and regular rhythm.      Heart sounds: Normal heart sounds. No murmur heard.  Pulmonary:      Effort: Pulmonary effort is normal.      Breath sounds: Normal breath sounds. No wheezing.   Abdominal:      Palpations: Abdomen is soft. There is no hepatomegaly, splenomegaly or mass.      Tenderness: There is no abdominal tenderness.   Musculoskeletal:         General: No deformity. Normal range of motion.      Cervical back: Neck supple.      Right lower leg: No edema.      Left lower leg: No edema.   Lymphadenopathy:      Cervical: No cervical adenopathy.   Skin:     General: Skin is warm and dry.      Findings: No rash.      Comments: On exposed skin   Neurological:      Mental Status: She is alert and oriented to person, place, and time.      Deep Tendon Reflexes: Reflexes are normal and symmetric.   Psychiatric:         Behavior: Behavior normal.         Thought Content: Thought content normal.         Judgment: Judgment normal.         Component      Latest Ref Rn 7/26/2023 1/19/2024   WBC      3.90 - 12.70 K/uL 4.73     RBC      4.00 - 5.40 M/uL 4.33     Hemoglobin      12.0 - 16.0 g/dL 13.5     Hematocrit      37.0 - 48.5 % 42.5     MCV      82 - 98 fL 98     MCH      27.0 - 31.0 pg 31.2 (H)     MCHC      32.0 - 36.0 g/dL 31.8 (L)     RDW      11.5 - 14.5 % 12.9     Platelet Count      150 - 450 K/uL 222     MPV      9.2 - 12.9 fL " 10.4     Immature Granulocytes      0.0 - 0.5 % 0.2     Gran # (ANC)      1.8 - 7.7 K/uL 2.3     Immature Grans (Abs)      0.00 - 0.04 K/uL 0.01     Lymph #      1.0 - 4.8 K/uL 2.0     Mono #      0.3 - 1.0 K/uL 0.4     Eos #      0.0 - 0.5 K/uL 0.1     Baso #      0.00 - 0.20 K/uL 0.02     nRBC      0 /100 WBC 0     Gran %      38.0 - 73.0 % 47.9     Lymph %      18.0 - 48.0 % 41.6     Mono %      4.0 - 15.0 % 8.0     Eosinophil %      0.0 - 8.0 % 1.9     Basophil %      0.0 - 1.9 % 0.4     Differential Method Automated     Sodium      136 - 145 mmol/L 143  144    Potassium      3.5 - 5.1 mmol/L 4.2  4.8    Chloride      95 - 110 mmol/L 106  108    CO2      23 - 29 mmol/L 27  27    Glucose      70 - 110 mg/dL 87  83    BUN      8 - 23 mg/dL 17  20    Creatinine      0.5 - 1.4 mg/dL 0.9  0.9    Calcium      8.7 - 10.5 mg/dL 9.9  10.0    PROTEIN TOTAL      6.0 - 8.4 g/dL 6.7  7.1    Albumin      3.5 - 5.2 g/dL 3.9  4.0    BILIRUBIN TOTAL      0.1 - 1.0 mg/dL 0.7  0.7    ALP      55 - 135 U/L 70  70    AST      10 - 40 U/L 21  20    ALT      10 - 44 U/L 16  20    eGFR      >60 mL/min/1.73 m^2 >60.0  >60.0    Anion Gap      8 - 16 mmol/L 10  9    Cholesterol Total      120 - 199 mg/dL 140  161    Triglycerides      30 - 150 mg/dL 49  71    HDL      40 - 75 mg/dL 67  60    LDL Cholesterol      63.0 - 159.0 mg/dL 63.2  86.8    HDL/Cholesterol Ratio      20.0 - 50.0 % 47.9  37.3    Total Cholesterol/HDL Ratio      2.0 - 5.0  2.1  2.7    Non-HDL Cholesterol      mg/dL 73  101    Hemoglobin A1C External      4.0 - 5.6 %  5.4    Estimated Avg Glucose      68 - 131 mg/dL  108    TSH      0.400 - 4.000 uIU/mL 5.595 (H)  2.771    Free T4      0.71 - 1.51 ng/dL 0.91        Legend:  (H) High  (L) Low

## 2024-01-26 ENCOUNTER — OFFICE VISIT (OUTPATIENT)
Dept: FAMILY MEDICINE | Facility: CLINIC | Age: 69
End: 2024-01-26
Payer: MEDICARE

## 2024-01-26 VITALS
TEMPERATURE: 98 F | OXYGEN SATURATION: 98 % | HEIGHT: 69 IN | WEIGHT: 176.13 LBS | BODY MASS INDEX: 26.09 KG/M2 | HEART RATE: 68 BPM | DIASTOLIC BLOOD PRESSURE: 76 MMHG | SYSTOLIC BLOOD PRESSURE: 124 MMHG

## 2024-01-26 DIAGNOSIS — Z12.31 ENCOUNTER FOR SCREENING MAMMOGRAM FOR MALIGNANT NEOPLASM OF BREAST: ICD-10-CM

## 2024-01-26 DIAGNOSIS — M54.50 CHRONIC MIDLINE LOW BACK PAIN WITHOUT SCIATICA: ICD-10-CM

## 2024-01-26 DIAGNOSIS — I10 ESSENTIAL HYPERTENSION: ICD-10-CM

## 2024-01-26 DIAGNOSIS — Z23 NEEDS FLU SHOT: ICD-10-CM

## 2024-01-26 DIAGNOSIS — E78.5 DYSLIPIDEMIA: ICD-10-CM

## 2024-01-26 DIAGNOSIS — Z00.00 NORMAL PHYSICAL EXAM: Primary | ICD-10-CM

## 2024-01-26 DIAGNOSIS — D12.6 TUBULAR ADENOMA OF COLON: ICD-10-CM

## 2024-01-26 DIAGNOSIS — G89.29 CHRONIC MIDLINE LOW BACK PAIN WITHOUT SCIATICA: ICD-10-CM

## 2024-01-26 DIAGNOSIS — E06.3 HYPOTHYROIDISM DUE TO HASHIMOTO'S THYROIDITIS: Chronic | ICD-10-CM

## 2024-01-26 DIAGNOSIS — M85.89 OSTEOPENIA OF MULTIPLE SITES: ICD-10-CM

## 2024-01-26 DIAGNOSIS — K80.20 ASYMPTOMATIC CHOLELITHIASIS: ICD-10-CM

## 2024-01-26 DIAGNOSIS — E03.8 HYPOTHYROIDISM DUE TO HASHIMOTO'S THYROIDITIS: Chronic | ICD-10-CM

## 2024-01-26 PROCEDURE — 1126F AMNT PAIN NOTED NONE PRSNT: CPT | Mod: CPTII,S$GLB,, | Performed by: INTERNAL MEDICINE

## 2024-01-26 PROCEDURE — 3078F DIAST BP <80 MM HG: CPT | Mod: CPTII,S$GLB,, | Performed by: INTERNAL MEDICINE

## 2024-01-26 PROCEDURE — 3288F FALL RISK ASSESSMENT DOCD: CPT | Mod: CPTII,S$GLB,, | Performed by: INTERNAL MEDICINE

## 2024-01-26 PROCEDURE — 90694 VACC AIIV4 NO PRSRV 0.5ML IM: CPT | Mod: S$GLB,,, | Performed by: INTERNAL MEDICINE

## 2024-01-26 PROCEDURE — 1101F PT FALLS ASSESS-DOCD LE1/YR: CPT | Mod: CPTII,S$GLB,, | Performed by: INTERNAL MEDICINE

## 2024-01-26 PROCEDURE — G0008 ADMIN INFLUENZA VIRUS VAC: HCPCS | Mod: S$GLB,,, | Performed by: INTERNAL MEDICINE

## 2024-01-26 PROCEDURE — 99999 PR PBB SHADOW E&M-EST. PATIENT-LVL IV: CPT | Mod: PBBFAC,,, | Performed by: INTERNAL MEDICINE

## 2024-01-26 PROCEDURE — 3044F HG A1C LEVEL LT 7.0%: CPT | Mod: CPTII,S$GLB,, | Performed by: INTERNAL MEDICINE

## 2024-01-26 PROCEDURE — 3008F BODY MASS INDEX DOCD: CPT | Mod: CPTII,S$GLB,, | Performed by: INTERNAL MEDICINE

## 2024-01-26 PROCEDURE — 3074F SYST BP LT 130 MM HG: CPT | Mod: CPTII,S$GLB,, | Performed by: INTERNAL MEDICINE

## 2024-01-26 PROCEDURE — 99397 PER PM REEVAL EST PAT 65+ YR: CPT | Mod: S$GLB,,, | Performed by: INTERNAL MEDICINE

## 2024-01-26 PROCEDURE — 1160F RVW MEDS BY RX/DR IN RCRD: CPT | Mod: CPTII,S$GLB,, | Performed by: INTERNAL MEDICINE

## 2024-01-26 PROCEDURE — 1159F MED LIST DOCD IN RCRD: CPT | Mod: CPTII,S$GLB,, | Performed by: INTERNAL MEDICINE

## 2024-01-26 RX ORDER — LEVOTHYROXINE SODIUM 75 UG/1
75 TABLET ORAL
Qty: 90 TABLET | Refills: 3 | Status: SHIPPED | OUTPATIENT
Start: 2024-01-26

## 2024-01-26 RX ORDER — LOSARTAN POTASSIUM 25 MG/1
25 TABLET ORAL DAILY
Qty: 90 TABLET | Refills: 3 | Status: SHIPPED | OUTPATIENT
Start: 2024-01-26

## 2024-01-26 RX ORDER — ATORVASTATIN CALCIUM 10 MG/1
10 TABLET, FILM COATED ORAL DAILY
Qty: 90 TABLET | Refills: 3 | Status: SHIPPED | OUTPATIENT
Start: 2024-01-26

## 2024-02-26 ENCOUNTER — HOSPITAL ENCOUNTER (OUTPATIENT)
Dept: RADIOLOGY | Facility: HOSPITAL | Age: 69
Discharge: HOME | End: 2024-02-26
Attending: INTERNAL MEDICINE
Payer: MEDICARE

## 2024-02-26 DIAGNOSIS — Z12.31 ENCOUNTER FOR SCREENING MAMMOGRAM FOR MALIGNANT NEOPLASM OF BREAST: ICD-10-CM

## 2024-02-26 PROCEDURE — 77063 BREAST TOMOSYNTHESIS BI: CPT | Mod: 26,,, | Performed by: RADIOLOGY

## 2024-02-26 PROCEDURE — 77067 SCR MAMMO BI INCL CAD: CPT | Mod: 26,,, | Performed by: RADIOLOGY

## 2024-02-26 PROCEDURE — 77067 SCR MAMMO BI INCL CAD: CPT | Mod: TC,PO

## 2024-03-03 ENCOUNTER — PATIENT MESSAGE (OUTPATIENT)
Dept: OTHER | Facility: OTHER | Age: 69
End: 2024-03-03
Payer: MEDICARE

## 2024-04-01 ENCOUNTER — CLINICAL SUPPORT (OUTPATIENT)
Dept: OPHTHALMOLOGY | Facility: CLINIC | Age: 69
End: 2024-04-01
Payer: MEDICARE

## 2024-04-01 ENCOUNTER — OFFICE VISIT (OUTPATIENT)
Dept: OPHTHALMOLOGY | Facility: CLINIC | Age: 69
End: 2024-04-01
Payer: MEDICARE

## 2024-04-01 DIAGNOSIS — H40.1131 PRIMARY OPEN-ANGLE GLAUCOMA, BILATERAL, MILD STAGE: ICD-10-CM

## 2024-04-01 DIAGNOSIS — H40.1131 PRIMARY OPEN-ANGLE GLAUCOMA, BILATERAL, MILD STAGE: Primary | ICD-10-CM

## 2024-04-01 PROCEDURE — 99999 PR PBB SHADOW E&M-EST. PATIENT-LVL II: CPT | Mod: PBBFAC,,, | Performed by: OPHTHALMOLOGY

## 2024-04-01 PROCEDURE — 3044F HG A1C LEVEL LT 7.0%: CPT | Mod: CPTII,S$GLB,, | Performed by: OPHTHALMOLOGY

## 2024-04-01 PROCEDURE — 1159F MED LIST DOCD IN RCRD: CPT | Mod: CPTII,S$GLB,, | Performed by: OPHTHALMOLOGY

## 2024-04-01 PROCEDURE — 3288F FALL RISK ASSESSMENT DOCD: CPT | Mod: CPTII,S$GLB,, | Performed by: OPHTHALMOLOGY

## 2024-04-01 PROCEDURE — G2211 COMPLEX E/M VISIT ADD ON: HCPCS | Mod: S$GLB,,, | Performed by: OPHTHALMOLOGY

## 2024-04-01 PROCEDURE — 4010F ACE/ARB THERAPY RXD/TAKEN: CPT | Mod: CPTII,S$GLB,, | Performed by: OPHTHALMOLOGY

## 2024-04-01 PROCEDURE — 1101F PT FALLS ASSESS-DOCD LE1/YR: CPT | Mod: CPTII,S$GLB,, | Performed by: OPHTHALMOLOGY

## 2024-04-01 PROCEDURE — 1126F AMNT PAIN NOTED NONE PRSNT: CPT | Mod: CPTII,S$GLB,, | Performed by: OPHTHALMOLOGY

## 2024-04-01 PROCEDURE — 1160F RVW MEDS BY RX/DR IN RCRD: CPT | Mod: CPTII,S$GLB,, | Performed by: OPHTHALMOLOGY

## 2024-04-01 PROCEDURE — 99213 OFFICE O/P EST LOW 20 MIN: CPT | Mod: S$GLB,,, | Performed by: OPHTHALMOLOGY

## 2024-04-01 NOTE — PROGRESS NOTES
HPI    Pt presents for HVF and IOP     States no current ocular complaints.     No eye meds     Last edited by Heidy Morgan on 4/1/2024  9:47 AM.            Assessment /Plan     For exam results, see Encounter Report.    Primary open-angle glaucoma, bilateral, mild stage      Unknown famhx  Thin pachy    Very high IOP OD>OS tmax 38/28  Optic nerves asymmetry OD>OS  OCT progression OD, recently stable, Stable OS  HVF essentially normal, stable and repeats  S/p SLT 2022 OU, excellent response  Target IOP low 20s    IOP at target, need to watch OD closely  Continue to monitor off meds    Fu 6 months, DFE, OCT NFL    Visit today is associated with current or anticipated ongoing medical care related to this patients single serious and complex condition, glaucoma.

## 2024-10-01 ENCOUNTER — OFFICE VISIT (OUTPATIENT)
Dept: OPHTHALMOLOGY | Facility: CLINIC | Age: 69
End: 2024-10-01
Payer: MEDICARE

## 2024-10-01 DIAGNOSIS — H02.054 TRICHIASIS OF LEFT UPPER EYELID: ICD-10-CM

## 2024-10-01 DIAGNOSIS — H40.1131 PRIMARY OPEN-ANGLE GLAUCOMA, BILATERAL, MILD STAGE: Primary | ICD-10-CM

## 2024-10-01 PROCEDURE — 1159F MED LIST DOCD IN RCRD: CPT | Mod: CPTII,S$GLB,, | Performed by: OPHTHALMOLOGY

## 2024-10-01 PROCEDURE — 3288F FALL RISK ASSESSMENT DOCD: CPT | Mod: CPTII,S$GLB,, | Performed by: OPHTHALMOLOGY

## 2024-10-01 PROCEDURE — 1126F AMNT PAIN NOTED NONE PRSNT: CPT | Mod: CPTII,S$GLB,, | Performed by: OPHTHALMOLOGY

## 2024-10-01 PROCEDURE — 92133 CPTRZD OPH DX IMG PST SGM ON: CPT | Mod: S$GLB,,, | Performed by: OPHTHALMOLOGY

## 2024-10-01 PROCEDURE — 99213 OFFICE O/P EST LOW 20 MIN: CPT | Mod: S$GLB,,, | Performed by: OPHTHALMOLOGY

## 2024-10-01 PROCEDURE — 1101F PT FALLS ASSESS-DOCD LE1/YR: CPT | Mod: CPTII,S$GLB,, | Performed by: OPHTHALMOLOGY

## 2024-10-01 PROCEDURE — 99999 PR PBB SHADOW E&M-EST. PATIENT-LVL II: CPT | Mod: PBBFAC,,, | Performed by: OPHTHALMOLOGY

## 2024-10-01 PROCEDURE — 1160F RVW MEDS BY RX/DR IN RCRD: CPT | Mod: CPTII,S$GLB,, | Performed by: OPHTHALMOLOGY

## 2024-10-01 PROCEDURE — G2211 COMPLEX E/M VISIT ADD ON: HCPCS | Mod: S$GLB,,, | Performed by: OPHTHALMOLOGY

## 2024-10-01 PROCEDURE — 3044F HG A1C LEVEL LT 7.0%: CPT | Mod: CPTII,S$GLB,, | Performed by: OPHTHALMOLOGY

## 2024-10-01 PROCEDURE — 4010F ACE/ARB THERAPY RXD/TAKEN: CPT | Mod: CPTII,S$GLB,, | Performed by: OPHTHALMOLOGY

## 2024-10-01 NOTE — PROGRESS NOTES
HPI    DLS: 4/1/2024 DFE/ OCT N     Pt states no new complaints.     No gtts     Denies pain/ FOL/ floaters.     Last edited by Alisa Cooper on 10/1/2024  9:43 AM.            Assessment /Plan     For exam results, see Encounter Report.    Primary open-angle glaucoma, bilateral, mild stage    Trichiasis of left upper eyelid      1. Primary open-angle glaucoma, bilateral, mild stage (Primary)  Unknown famhx  Thin pachy    Very high IOP OD>OS tmax 38/28  Optic nerves asymmetry OD>OS  OCT progression OD, recently stable, Stable OS  HVF essentially normal, stable and repeats  S/p SLT 2022 OU, excellent response  Target IOP low 20s    IOP at target, need to watch OD closely  Continue to monitor off meds    Fu 6 months, HVF, IOP check    Visit today is associated with current or anticipated ongoing medical care related to this patients single serious and complex condition, glaucoma.      2. Trichiasis of left upper eyelid  Pt not bothered, observe

## 2024-10-09 ENCOUNTER — E-VISIT (OUTPATIENT)
Dept: FAMILY MEDICINE | Facility: CLINIC | Age: 69
End: 2024-10-09
Payer: MEDICARE

## 2024-10-09 DIAGNOSIS — I10 ESSENTIAL HYPERTENSION: Primary | ICD-10-CM

## 2024-10-09 RX ORDER — LOSARTAN POTASSIUM 50 MG/1
50 TABLET ORAL DAILY
Qty: 90 TABLET | Refills: 3 | Status: SHIPPED | OUTPATIENT
Start: 2024-10-09

## 2024-10-10 NOTE — PROGRESS NOTES
Patient ID: Radha Fraga is a 69 y.o. female.    This note was created by combination of typed  and M-Modal dictation.  Transcription errors may be present.  If there are any questions, please contact me.    Assessment and Plan:   1. Essential hypertension  Increase losartan; continue with digital monitoring  - losartan (COZAAR) 50 MG tablet; Take 1 tablet (50 mg total) by mouth once daily.  Dispense: 90 tablet; Refill: 3        No orders of the defined types were placed in this encounter.     Medications Ordered This Encounter   Medications    losartan (COZAAR) 50 MG tablet     Sig: Take 1 tablet (50 mg total) by mouth once daily.     Dispense:  90 tablet     Refill:  3     Increased dose        No follow-ups on file. or sooner as needed.        E-Visit Time Tracking:    Day 1, 11 min               274}    Follow Up: No follow-ups on file.    Subjective:   Patient ID: Radha Fraga is a 69 y.o. female.    Chief Complaint: General Illness (Entered automatically based on patient selection in [employer])                    274}    History of Present Illness:  The patient initiated a request through [employer] on 10/9/2024 for evaluation and management with a chief complaint of General Illness (Entered automatically based on patient selection in [employer])     Hypertension needs to monitor with digital monitoring  Hyperlipidemia 01/2023 started on statin   Hypothyroid on levothyroxine  Migraines  History of TA  Chronic back pain  Osteopenia DEXA scan 2022  10/12/2023 colonoscopy internal hemorrhoids.  Repeat 5 years    Last visit with me 1/26/24  HTN stable  Lipid stable/statin  Thyroid stable on LT    Infrequent recent tests with digital HTN monitoring  Elevated BP; episode of lightheaded      I evaluated the questionnaire submission on 10/09/2024 .    E-visit Questionnaire:  [facility] Peq Evisit Supergroup-Chronic Conditions    10/9/2024  4:44 PM CDT - Filed by Patient   What do you need help with? High Blood  Pressure   Do you agree to participate in an E-Visit? Yes   If you have any of the following symptoms, please do not complete an E-Visit. Instead, schedule an appointment with your provider I acknowledge   What would you like addressed about your blood pressure? New concern   What is the main issue you would like addressed today? Blood pressure elevated   Your blood pressure is a: Recurring problem   When were you first diagnosed with high blood pressure? More than 1 year ago   Since you first learned you had high blood pressure, how has it changed? Gradually worsening   How would you classify your blood pressure? Well controlled   Are you having any of the following symptoms from your high blood pressure? Headache   Are you taking any of the following medications? Thyroid pills   The following factors can make high blood pressure worse or harder to control. Which of them might be contributing to your high blood pressure?  Poor diet;  Excess weight;  Stress   Have you taken blood pressure medications in the past that caused you problems or side effects? No   Are you currently taking medication(s) for your blood pressure? Yes   Have you recently started a new medication or changed your dose? No   How often are you taking your medication per week?  Every day   Have you had any side effects from your current blood pressure medication? No   Are you able to take your blood pressure? Yes   Please give your most recent blood pressure readings    Reading 1 153/91-10/8/24 9:00am   Reading 2 144/87-10/9/24-4:40pm   Reading 3    Reading 4    Reading 5    If you are able to take your pulse, please provide it below. 68   Provide any additional information you feel is important. I felt dizzy last night and BP was 160/97   Please attach any relevant images or files    Are you able to take any other vitals? No          Problem List:  Active Problem List with Overview Notes    Diagnosis Date Noted    Acute pain of right shoulder  07/17/2023    Dyslipidemia 01/26/2023    Decreased ROM of trunk and back 09/01/2022    Decreased strength of trunk and back 09/01/2022    Osteopenia of multiple sites 02/10/2022     02/08/2022 DEXA scan L-spine-1.4, total hip-0.2, femoral neck 0.1.  FRAX score 0.5/6.9%.  Osteopenia.  Compared to previous, 5% decline total hip.      Palpitations 02/03/2021    History of COVID-19 02/03/2021    Chronic midline low back pain without sciatica 08/10/2016     8/10/2016 MRI L spine: 1. Significant degenerative disc disease at L5-S1 resulting in mild bilateral neuroforaminal narrowing.  Otherwise mild multilevel lumbar spondylosis. 2. Cholelithiasis.  MRI of T spine showed: 1. Mild degenerative changes of the thoracic spine. No spinal canal stenosis or neuroforaminal narrowing.  Spinal cord signal and morphology are maintained.2.  Cholelithiasis. 3.  Subcentimeter T2 hyperintense lesion at the liver dome incompletely characterized.      Hypothyroidism due to Hashimoto's thyroiditis 06/16/2015    Migraine syndrome 05/19/2015     Symptoms - tightness in the back of the neck and scotomas - really more opthalmic migraine.  5/18/2015 MRI brain neg  5/18/2015 carotid US negative.      Essential hypertension 03/25/2014     2/10/2021 TTE LV normal size with eccentric hypertrophy and normal systolic function LVEF 60%.  Indeterminate diastolic function.  Normal RV size and systolic function.  No pulmonary hypertension.        Tubular adenoma of colon 2013 and 2018; 10/12/2023 colonoscopy internal hemorrhoids.  Repeat 5 years 03/25/2014 1/11/2013 colonoscopy with sigmoid hyperplastic polyp with suggestion of focal early adenomatous change; melanosis coli.  3/23/2018 colonoscopy rectal sessile tubular adenoma  10/12/2023 colonoscopy internal hemorrhoids.  Repeat 5 years      Alopecia areata 03/25/2014    Asymptomatic cholelithiasis 10/13/2012    Lichen planus 10/13/2012    Scoliosis 10/13/2012        Recent Labs Obtained:  No visits  with results within 7 Day(s) from this visit.   Latest known visit with results is:   Lab Visit on 01/19/2024   Component Date Value Ref Range Status    Sodium 01/19/2024 144  136 - 145 mmol/L Final    Potassium 01/19/2024 4.8  3.5 - 5.1 mmol/L Final    Chloride 01/19/2024 108  95 - 110 mmol/L Final    CO2 01/19/2024 27  23 - 29 mmol/L Final    Glucose 01/19/2024 83  70 - 110 mg/dL Final    BUN 01/19/2024 20  8 - 23 mg/dL Final    Creatinine 01/19/2024 0.9  0.5 - 1.4 mg/dL Final    Calcium 01/19/2024 10.0  8.7 - 10.5 mg/dL Final    Total Protein 01/19/2024 7.1  6.0 - 8.4 g/dL Final    Albumin 01/19/2024 4.0  3.5 - 5.2 g/dL Final    Total Bilirubin 01/19/2024 0.7  0.1 - 1.0 mg/dL Final    Comment: For infants and newborns, interpretation of results should be based  on gestational age, weight and in agreement with clinical  observations.    Premature Infant recommended reference ranges:  Up to 24 hours.............<8.0 mg/dL  Up to 48 hours............<12.0 mg/dL  3-5 days..................<15.0 mg/dL  6-29 days.................<15.0 mg/dL      Alkaline Phosphatase 01/19/2024 70  55 - 135 U/L Final    AST 01/19/2024 20  10 - 40 U/L Final    ALT 01/19/2024 20  10 - 44 U/L Final    eGFR 01/19/2024 >60.0  >60 mL/min/1.73 m^2 Final    Anion Gap 01/19/2024 9  8 - 16 mmol/L Final    Cholesterol 01/19/2024 161  120 - 199 mg/dL Final    Comment: The National Cholesterol Education Program (NCEP) has set the  following guidelines (reference ranges) for Cholesterol:  Optimal.....................<200 mg/dL  Borderline High.............200-239 mg/dL  High........................> or = 240 mg/dL      Triglycerides 01/19/2024 71  30 - 150 mg/dL Final    Comment: The National Cholesterol Education Program (NCEP) has set the  following guidelines (reference values) for triglycerides:  Normal......................<150 mg/dL  Borderline High.............150-199 mg/dL  High........................200-499 mg/dL      HDL 01/19/2024 60  40  - 75 mg/dL Final    Comment: The National Cholesterol Education Program (NCEP) has set the  following guidelines (reference values) for HDL Cholesterol:  Low...............<40 mg/dL  Optimal...........>60 mg/dL      LDL Cholesterol 01/19/2024 86.8  63.0 - 159.0 mg/dL Final    Comment: The National Cholesterol Education Program (NCEP) has set the  following guidelines (reference values) for LDL Cholesterol:  Optimal.......................<130 mg/dL  Borderline High...............130-159 mg/dL  High..........................160-189 mg/dL  Very High.....................>190 mg/dL      HDL/Cholesterol Ratio 01/19/2024 37.3  20.0 - 50.0 % Final    Total Cholesterol/HDL Ratio 01/19/2024 2.7  2.0 - 5.0 Final    Non-HDL Cholesterol 01/19/2024 101  mg/dL Final    Comment: Risk category and Non-HDL cholesterol goals:  Coronary heart disease (CHD)or equivalent (10-year risk of CHD >20%):  Non-HDL cholesterol goal     <130 mg/dL  Two or more CHD risk factors and 10-year risk of CHD <= 20%:  Non-HDL cholesterol goal     <160 mg/dL  0 to 1 CHD risk factor:  Non-HDL cholesterol goal     <190 mg/dL      TSH 01/19/2024 2.771  0.400 - 4.000 uIU/mL Final    Hemoglobin A1C 01/19/2024 5.4  4.0 - 5.6 % Final    Comment: ADA Screening Guidelines:  5.7-6.4%  Consistent with prediabetes  >or=6.5%  Consistent with diabetes    High levels of fetal hemoglobin interfere with the HbA1C  assay. Heterozygous hemoglobin variants (HbS, HgC, etc)do  not significantly interfere with this assay.   However, presence of multiple variants may affect accuracy.      Estimated Avg Glucose 01/19/2024 108  68 - 131 mg/dL Final         ALLERGIES AND MEDICATIONS: updated and reviewed.  Review of patient's allergies indicates:   Allergen Reactions    Iodinated contrast media        Medication List with Changes/Refills   Current Medications    ATORVASTATIN (LIPITOR) 10 MG TABLET    Take 1 tablet (10 mg total) by mouth once daily.    BENEFIBER, GUAR GUM, ORAL     Take by mouth.    CLOBETASOL (TEMOVATE) 0.05 % EXTERNAL SOLUTION    Apply topically once daily. Use to affected areas for up to 2 weeks then take a 1 week break or decrease to 3 times weekly. Do not apply to groin or face. Use to scalp    KETOCONAZOLE (NIZORAL) 2 % SHAMPOO    Apply topically 3 (three) times a week. Use to wash scalp. Leave on for 5-10 minutes then wash off    LEVOTHYROXINE (SYNTHROID) 75 MCG TABLET    Take 1 tablet (75 mcg total) by mouth before breakfast.    LOSARTAN (COZAAR) 25 MG TABLET    Take 1 tablet (25 mg total) by mouth once daily.    TACROLIMUS (PROTOPIC) 0.1 % OINTMENT    Apply topically 2 (two) times daily. Use to itchy rash in armpits and on back    TRIAMCINOLONE ACETONIDE 0.025% (KENALOG) 0.025 % CREAM    Apply topically 2 (two) times daily. Use to affected areas for up to 2 weeks then take a 1 week break or decrease to 3 times weekly. Do not apply to groin or face. Use for rash on upper back and armpits as needed. Do not use for prolonged periods in armpits as it can cause stretch marks and skin thinning

## 2024-10-21 ENCOUNTER — ON-DEMAND VIRTUAL (OUTPATIENT)
Dept: URGENT CARE | Facility: CLINIC | Age: 69
End: 2024-10-21
Payer: MEDICARE

## 2024-10-21 ENCOUNTER — LAB VISIT (OUTPATIENT)
Dept: LAB | Facility: HOSPITAL | Age: 69
End: 2024-10-21
Payer: MEDICARE

## 2024-10-21 ENCOUNTER — OFFICE VISIT (OUTPATIENT)
Dept: FAMILY MEDICINE | Facility: CLINIC | Age: 69
End: 2024-10-21
Payer: MEDICARE

## 2024-10-21 VITALS
OXYGEN SATURATION: 98 % | DIASTOLIC BLOOD PRESSURE: 76 MMHG | SYSTOLIC BLOOD PRESSURE: 110 MMHG | WEIGHT: 177.25 LBS | BODY MASS INDEX: 26.18 KG/M2 | TEMPERATURE: 98 F | HEART RATE: 60 BPM

## 2024-10-21 DIAGNOSIS — R10.11 RIGHT UPPER QUADRANT ABDOMINAL PAIN: ICD-10-CM

## 2024-10-21 DIAGNOSIS — I10 ESSENTIAL HYPERTENSION: ICD-10-CM

## 2024-10-21 DIAGNOSIS — R10.11 RIGHT UPPER QUADRANT PAIN: Primary | ICD-10-CM

## 2024-10-21 DIAGNOSIS — G43.909 MIGRAINE SYNDROME: Chronic | ICD-10-CM

## 2024-10-21 DIAGNOSIS — E78.5 DYSLIPIDEMIA: ICD-10-CM

## 2024-10-21 DIAGNOSIS — R10.11 RIGHT UPPER QUADRANT ABDOMINAL PAIN: Primary | ICD-10-CM

## 2024-10-21 LAB
ALBUMIN SERPL BCP-MCNC: 4.4 G/DL (ref 3.5–5.2)
ALP SERPL-CCNC: 81 U/L (ref 40–150)
ALT SERPL W/O P-5'-P-CCNC: 15 U/L (ref 10–44)
ANION GAP SERPL CALC-SCNC: 9 MMOL/L (ref 8–16)
AST SERPL-CCNC: 20 U/L (ref 10–40)
BASOPHILS # BLD AUTO: 0.02 K/UL (ref 0–0.2)
BASOPHILS NFR BLD: 0.4 % (ref 0–1.9)
BILIRUB SERPL-MCNC: 0.8 MG/DL (ref 0.1–1)
BUN SERPL-MCNC: 18 MG/DL (ref 8–23)
CALCIUM SERPL-MCNC: 10.5 MG/DL (ref 8.7–10.5)
CHLORIDE SERPL-SCNC: 107 MMOL/L (ref 95–110)
CO2 SERPL-SCNC: 24 MMOL/L (ref 23–29)
CREAT SERPL-MCNC: 0.8 MG/DL (ref 0.5–1.4)
DIFFERENTIAL METHOD BLD: NORMAL
EOSINOPHIL # BLD AUTO: 0.1 K/UL (ref 0–0.5)
EOSINOPHIL NFR BLD: 1.5 % (ref 0–8)
ERYTHROCYTE [DISTWIDTH] IN BLOOD BY AUTOMATED COUNT: 12.9 % (ref 11.5–14.5)
EST. GFR  (NO RACE VARIABLE): >60 ML/MIN/1.73 M^2
GLUCOSE SERPL-MCNC: 87 MG/DL (ref 70–110)
HCT VFR BLD AUTO: 46 % (ref 37–48.5)
HGB BLD-MCNC: 15.1 G/DL (ref 12–16)
IMM GRANULOCYTES # BLD AUTO: 0.01 K/UL (ref 0–0.04)
IMM GRANULOCYTES NFR BLD AUTO: 0.2 % (ref 0–0.5)
LYMPHOCYTES # BLD AUTO: 2.2 K/UL (ref 1–4.8)
LYMPHOCYTES NFR BLD: 40.4 % (ref 18–48)
MCH RBC QN AUTO: 30.6 PG (ref 27–31)
MCHC RBC AUTO-ENTMCNC: 32.8 G/DL (ref 32–36)
MCV RBC AUTO: 93 FL (ref 82–98)
MONOCYTES # BLD AUTO: 0.4 K/UL (ref 0.3–1)
MONOCYTES NFR BLD: 7.5 % (ref 4–15)
NEUTROPHILS # BLD AUTO: 2.7 K/UL (ref 1.8–7.7)
NEUTROPHILS NFR BLD: 50 % (ref 38–73)
NRBC BLD-RTO: 0 /100 WBC
PLATELET # BLD AUTO: 233 K/UL (ref 150–450)
PMV BLD AUTO: 10.3 FL (ref 9.2–12.9)
POTASSIUM SERPL-SCNC: 4.1 MMOL/L (ref 3.5–5.1)
PROT SERPL-MCNC: 7.7 G/DL (ref 6–8.4)
RBC # BLD AUTO: 4.93 M/UL (ref 4–5.4)
SODIUM SERPL-SCNC: 140 MMOL/L (ref 136–145)
T4 FREE SERPL-MCNC: 1.12 NG/DL (ref 0.71–1.51)
TSH SERPL DL<=0.005 MIU/L-ACNC: 3.42 UIU/ML (ref 0.4–4)
WBC # BLD AUTO: 5.47 K/UL (ref 3.9–12.7)

## 2024-10-21 PROCEDURE — 1159F MED LIST DOCD IN RCRD: CPT | Mod: CPTII,S$GLB,, | Performed by: NURSE PRACTITIONER

## 2024-10-21 PROCEDURE — 4010F ACE/ARB THERAPY RXD/TAKEN: CPT | Mod: CPTII,S$GLB,, | Performed by: NURSE PRACTITIONER

## 2024-10-21 PROCEDURE — 1101F PT FALLS ASSESS-DOCD LE1/YR: CPT | Mod: CPTII,S$GLB,, | Performed by: NURSE PRACTITIONER

## 2024-10-21 PROCEDURE — 36415 COLL VENOUS BLD VENIPUNCTURE: CPT | Mod: PO | Performed by: NURSE PRACTITIONER

## 2024-10-21 PROCEDURE — 99213 OFFICE O/P EST LOW 20 MIN: CPT | Mod: 95,,, | Performed by: NURSE PRACTITIONER

## 2024-10-21 PROCEDURE — 99214 OFFICE O/P EST MOD 30 MIN: CPT | Mod: S$GLB,,, | Performed by: NURSE PRACTITIONER

## 2024-10-21 PROCEDURE — 3044F HG A1C LEVEL LT 7.0%: CPT | Mod: CPTII,S$GLB,, | Performed by: NURSE PRACTITIONER

## 2024-10-21 PROCEDURE — 1125F AMNT PAIN NOTED PAIN PRSNT: CPT | Mod: CPTII,S$GLB,, | Performed by: NURSE PRACTITIONER

## 2024-10-21 PROCEDURE — 3008F BODY MASS INDEX DOCD: CPT | Mod: CPTII,S$GLB,, | Performed by: NURSE PRACTITIONER

## 2024-10-21 PROCEDURE — 80053 COMPREHEN METABOLIC PANEL: CPT | Performed by: NURSE PRACTITIONER

## 2024-10-21 PROCEDURE — 84439 ASSAY OF FREE THYROXINE: CPT | Performed by: NURSE PRACTITIONER

## 2024-10-21 PROCEDURE — 3288F FALL RISK ASSESSMENT DOCD: CPT | Mod: CPTII,S$GLB,, | Performed by: NURSE PRACTITIONER

## 2024-10-21 PROCEDURE — 84443 ASSAY THYROID STIM HORMONE: CPT | Performed by: NURSE PRACTITIONER

## 2024-10-21 PROCEDURE — 3078F DIAST BP <80 MM HG: CPT | Mod: CPTII,S$GLB,, | Performed by: NURSE PRACTITIONER

## 2024-10-21 PROCEDURE — 3074F SYST BP LT 130 MM HG: CPT | Mod: CPTII,S$GLB,, | Performed by: NURSE PRACTITIONER

## 2024-10-21 PROCEDURE — 99999 PR PBB SHADOW E&M-EST. PATIENT-LVL IV: CPT | Mod: PBBFAC,,, | Performed by: NURSE PRACTITIONER

## 2024-10-21 PROCEDURE — 85025 COMPLETE CBC W/AUTO DIFF WBC: CPT | Performed by: NURSE PRACTITIONER

## 2024-10-21 NOTE — PROGRESS NOTES
Subjective:      Patient ID: Radha Fraga is a 69 y.o. female.    Vitals:  vitals were not taken for this visit.     Chief Complaint: Pain      Visit Type: TELE AUDIOVISUAL - This visit was conducted virtually based on  subjective information and limited objective exam    Present with the patient at the time of consultation: TELEMED PRESENT WITH PATIENT: None  Two patient identifiers used to verify patient- saying out date of birth and full name.       Past Medical History:   Diagnosis Date    Asymptomatic cholelithiasis     Hypothyroidism     Lichen planus     Scoliosis     Unspecified essential hypertension 3/25/2014     Past Surgical History:   Procedure Laterality Date    COLONOSCOPY  2006    normal.    COLONOSCOPY N/A 03/23/2018    Procedure: COLONOSCOPY;  Surgeon: Tom Gómez MD;  Location: [facility] ENDO;  Service: Endoscopy;  Laterality: N/A;    COLONOSCOPY N/A 10/12/2023    Procedure: COLONOSCOPY;  Surgeon: Sky Paredes MD;  Location: [facility] ENDO;  Service: Endoscopy;  Laterality: N/A;  suprep instr. to portal. Referral Dr. Chicho Meléndez.    EYE SURGERY  1/21/2022    Laser treatment for Glaucoma    NOSE SURGERY      TONSILLECTOMY      TUBAL LIGATION       Review of patient's allergies indicates:   Allergen Reactions    Iodinated contrast media      Current Outpatient Medications on File Prior to Visit   Medication Sig Dispense Refill    atorvastatin (LIPITOR) 10 MG tablet Take 1 tablet (10 mg total) by mouth once daily. 90 tablet 3    clobetasoL (TEMOVATE) 0.05 % external solution Apply topically once daily. Use to affected areas for up to 2 weeks then take a 1 week break or decrease to 3 times weekly. Do not apply to groin or face. Use to scalp 50 mL 2    ketoconazole (NIZORAL) 2 % shampoo Apply topically 3 (three) times a week. Use to wash scalp. Leave on for 5-10 minutes then wash off 120 mL 10    levothyroxine (SYNTHROID) 75 MCG tablet Take 1 tablet (75 mcg total) by mouth before breakfast. 90 tablet 3     losartan (COZAAR) 50 MG tablet Take 1 tablet (50 mg total) by mouth once daily. 90 tablet 3    tacrolimus (PROTOPIC) 0.1 % ointment Apply topically 2 (two) times daily. Use to itchy rash in armpits and on back 60 g 2    triamcinolone acetonide 0.025% (KENALOG) 0.025 % cream Apply topically 2 (two) times daily. Use to affected areas for up to 2 weeks then take a 1 week break or decrease to 3 times weekly. Do not apply to groin or face. Use for rash on upper back and armpits as needed. Do not use for prolonged periods in armpits as it can cause stretch marks and skin thinning 80 g 2     No current facility-administered medications on file prior to visit.     Family History   Adopted: Yes   Problem Relation Name Age of Onset    Hypertension Sister      Hypertension Sister Edward     No Known Problems Daughter      Hypertension Son Vladimir     No Known Problems Son      Skin cancer Neg Hx             Ohs Peq Odvv Intake    10/21/2024  7:48 AM CDT - Filed by Patient   What is your current physical address in the event of a medical emergency? [address] [place] [address]  [id number]   Are you able to take your vital signs? Yes   Systolic Blood Pressure: 157   Diastolic Blood Pressure: 82   Weight: 1277   Height: 69   Pulse: 57   Temperature: 97.8   Respiration rate:    Pulse Oxygen:    Please attach any relevant images or files    Is your employer contracted with [facility]? No         70 yo female with c/o pain to right side since 930 last night. Constant. She states massaging helps. She denies nausea and vomiting but did have diarrhea. She states she had diarrhea last night. She states she ate a heavy greasy meal at lunch. She denies belching and sob and wheezing. She states pain is 6/10. She states she took advil which did help and was able to sleep. She states stretching makes feel better. She denies fever.         Constitution: Negative.   HENT: Negative.     Cardiovascular: Negative.    Respiratory:  Negative.     Gastrointestinal:  Positive for abdominal pain.   Endocrine: negative.   Genitourinary: Negative.  Negative for frequency and urgency.   Musculoskeletal: Negative.    Skin: Negative.    Allergic/Immunologic: Negative.    Neurological: Negative.    Hematologic/Lymphatic: Negative.    Psychiatric/Behavioral: Negative.          Objective:   The physical exam was conducted virtually.  LOCATION OF PATIENT parminder thibodeauxO x 3 ; no acute distress noted; appearance normal; mood and behavior normal; thought process normal  Head- normocephalic  Nose- appears normal, no discharge or erythema  Eyes- pupils appear normal in size, no drainage, no erythema  Ears- normal appearing; no discharge, no erythema  Mouth- appears normal  Oropharynx- no erythema, lesions  Lungs- breathing at a normal rate, no acute distress noted  Heart- no reports of tachycardia, palpitations, chest pain  Abdomen- non distended, non tender reported by patient  Skin- warm and dry, no erythema or edema noted by patient or visualized  Psych- as above; no si/hi      Assessment:     1. Right upper quadrant pain        Plan:     Recommend in person visit to urgent care    Thank you for choosing [facility]!    Our goal in the [facility] is to always provide outstanding medical care. You may receive a survey by mail or e-mail in the next week regarding your experience today. We would greatly appreciate you completing and returning the survey. Your feedback provides us with a way to recognize our staff who provide very good care, and it helps us learn how to improve when your experience was below our aspiration of excellence.         We appreciate you trusting us with your medical care. We hope you feel better soon. We will be happy to take care of you for all of your future medical needs.    You must understand that you've received an Urgent Care treatment only and that you may be released before all your  medical problems are known or treated. You, the patient, will arrange for follow up care as instructed.    Follow up with your PCP or specialty clinic as directed in the next 1-2 weeks if not improved or as needed.  You can call [phone] to schedule an appointment with the appropriate provider.    If your condition worsens we recommend that you receive another evaluation in person, with your primary care provider, urgent care or at the emergency room immediately or contact your primary medical clinics after hours call service to discuss your concerns.         Right upper quadrant pain

## 2024-10-21 NOTE — PATIENT INSTRUCTIONS
Medical Fitness--[phone]  Imaging, Xray, CT, MRI, Ultrasound---[phone]  Bariatrics---[phone]  Breast Surgery---[phone]  Case Management---[phone]  Colonoscopy---[phone]  DME---[phone]  Infectious Disease---[phone]  Interventional Radiology---[phone]  Medical Records---[phone]  [facility]---[phone]  Optometry/Ophthalmology---[phone]  [facility]---[phone]  Physical Therapy---[phone]  Psychiatry---[phone] or [phone]  Plastic Surgery---[phone]  Recovery--[phone] option 2, or [phone].  Sleep Study---[phone]  Smoking Cessation---[phone]  Wound Care---[phone]  Referral Desk---[phone]

## 2024-10-21 NOTE — PROGRESS NOTES
HPI     Radha Fraga is a 69 y.o. female with multiple medical diagnoses as listed in the medical history and problem list that presents for   Chief Complaint   Patient presents with    Abdominal Pain     Right side         Abdominal Pain  This is a new problem. The current episode started yesterday. The onset quality is sudden. The problem has been unchanged. The pain is located in the RUQ. The pain is at a severity of 5/10. Quality: stabbing. The abdominal pain radiates to the RUQ. Associated symptoms include diarrhea. Pertinent negatives include no constipation, fever, headaches, melena, nausea or vomiting. Treatments tried: alleve. The treatment provided mild relief. Prior workup: none. Her past medical history is significant for gallstones.     Denies hx of kidney stones. She does still have her appendix. Relevant hx of cholelithiasis.       Assessment & Plan     Problem List Items Addressed This Visit          Neuro    Migraine syndrome (Chronic)    Symptoms have improved.  The current medical regimen is effective;  continue present plan and medications.      Overview     Symptoms - tightness in the back of the neck and scotomas - really more opthalmic migraine.  5/18/2015 MRI brain neg  5/18/2015 carotid US negative.            Cardiac/Vascular    Dyslipidemia    discussed ways to lower triglycerides such as cutting simple sugars out of diet (white breads, candies, cookies, cakes, etc.) and reducing/eliminating intake of highly processed trans fatty acids.   Exercise 30 minutes a day for 4-5 days a week.   Eat more fiber.      Relevant Orders    CBC Auto Differential    Comprehensive Metabolic Panel    TSH    T4, Free    Essential hypertension    BP Readings from Last 3 Encounters:   10/21/24 110/76   01/26/24 124/76   10/12/23 (!) 143/80       -continue current medication regimen  -DASH diet, regular cardiovascular exercises, portion control  -weight loss  -f/u with BP logs in 2 weeks      Overview      2/10/2021 TTE LV normal size with eccentric hypertrophy and normal systolic function LVEF 60%.  Indeterminate diastolic function.  Normal RV size and systolic function.  No pulmonary hypertension.           Relevant Orders    CBC Auto Differential    Comprehensive Metabolic Panel    TSH    T4, Free     Other Visit Diagnoses       Right upper quadrant abdominal pain    -  Primary    New onset. Acute, stabbing pain. Denies trigger or injury. Pain improved by applying pressure to RUQ region. Other associated symptoms include diarrhea. Denies nausea, emesis, blood in stool, changes in stool color. Relevant hx of cholelithiasis.     Obtain CT of abdomen  Obtain labs  Avoid strain    Discussed DDx, condition, and treatment.   Education sent to patient portal/included in after visit summary.  ED precautions given.   Notify provider if symptoms do not resolve or increase in severity.   Patient verbalizes understanding and agrees with plan of care.      Relevant Orders    CT Renal Stone Study ABD Pelvis WO    CBC Auto Differential    Comprehensive Metabolic Panel    TSH    T4, Free              --------------------------------------------      Health Maintenance:  Health Maintenance         Date Due Completion Date    RSV Vaccine (Age 60+ and Pregnant patients) (1 - Risk 60-74 years 1-dose series) Never done ---    Influenza Vaccine (1) 09/01/2024 1/26/2024    COVID-19 Vaccine (4 - 2024-25 season) 09/01/2024 1/27/2022    DEXA Scan 01/26/2025 1/26/2022    Mammogram 02/26/2025 2/26/2024    TETANUS VACCINE 03/15/2027 3/15/2017    Colorectal Cancer Screening 10/12/2028 10/12/2023    Lipid Panel 01/19/2029 1/19/2024            Discussed the importance of overdue vaccines which were offered during this encounter. Patient declined overdue vaccines at this time and Advised patient on the importance of completing overdue health maintenance items    Follow Up:  Follow up in about 2 weeks (around 11/4/2024), or if symptoms worsen or  fail to improve.    Exam     Review of Systems:  (as noted above)  Review of Systems   Constitutional:  Negative for fever.   HENT:  Negative for trouble swallowing.    Eyes:  Negative for visual disturbance.   Respiratory:  Negative for chest tightness and shortness of breath.    Cardiovascular:  Negative for chest pain.   Gastrointestinal:  Positive for abdominal pain and diarrhea. Negative for blood in stool, constipation, melena, nausea and vomiting.   Neurological:  Negative for headaches.       Physical Exam:   Physical Exam  Constitutional:       General: She is not in acute distress.     Appearance: She is not ill-appearing or diaphoretic.   HENT:      Head: Normocephalic and atraumatic.   Cardiovascular:      Rate and Rhythm: Normal rate and regular rhythm.   Pulmonary:      Effort: Pulmonary effort is normal. No respiratory distress.   Chest:      Chest wall: No tenderness.   Abdominal:      Tenderness: There is no right CVA tenderness, left CVA tenderness, guarding or rebound. Negative signs include Luna's sign, Rovsing's sign, McBurney's sign, psoas sign and obturator sign.   Neurological:      General: No focal deficit present.      Mental Status: She is alert and oriented to person, place, and time.       Vitals:    10/21/24 1023   BP: 110/76   BP Location: Left arm   Patient Position: Sitting   Pulse: 60   Temp: 97.7 °F (36.5 °C)   TempSrc: Oral   SpO2: 98%   Weight: 80.4 kg (177 lb 4 oz)      Body mass index is 26.18 kg/m².        History     Past Medical History:  Past Medical History:   Diagnosis Date    Asymptomatic cholelithiasis     Hypothyroidism     Lichen planus     Scoliosis     Unspecified essential hypertension 3/25/2014       Past Surgical History:  Past Surgical History:   Procedure Laterality Date    COLONOSCOPY  2006    normal.    COLONOSCOPY N/A 03/23/2018    Procedure: COLONOSCOPY;  Surgeon: Tom Gómez MD;  Location: [facility];  Service: Endoscopy;  Laterality: N/A;     COLONOSCOPY N/A 10/12/2023    Procedure: COLONOSCOPY;  Surgeon: Sky Paredes MD;  Location: [facility];  Service: Endoscopy;  Laterality: N/A;  suprep instr. to portal. Referral Dr. 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[employer]     Employer: RETIRED   Tobacco Use    Smoking status: Former     Current packs/day: 0.00     Average packs/day: 0.3 packs/day for 15.0 years (3.8 ttl pk-yrs)     Types: Cigarettes     Quit date: 10/12/2004     Years since quittin.0    Smokeless tobacco: Never   Substance and Sexual Activity    Alcohol use: Yes     Alcohol/week: 5.0 standard drinks of alcohol     Types: 5 Glasses of wine per week     Comment: occassionally    Drug use: No    Sexual activity: Not Currently     Partners: Male     Birth control/protection: Partner-Vasectomy, Post-menopausal     Social Drivers of Health     Financial Resource Strain: Low Risk  (2023)    Overall Financial Resource Strain (CARDIA)     Difficulty of Paying Living Expenses: Not hard at all   Food Insecurity: No Food Insecurity (2023)    Hunger Vital Sign     Worried About Running Out of Food in the Last Year: Never true     Ran Out of Food in the Last Year: Never true   Transportation Needs: No Transportation Needs (2023)    PRAPARE - Transportation     Lack of Transportation (Medical): No     Lack of Transportation (Non-Medical): No   Physical Activity: Insufficiently Active (2023)    Exercise Vital Sign     Days of Exercise per Week: 2 days     Minutes of Exercise per Session: 20 min   Stress: No Stress Concern Present (2023)    [employer] - Occupational Stress Questionnaire     Feeling of Stress : Only a little   Housing Stability: Low Risk   (12/2/2023)    Housing Stability Vital Sign     Unable to Pay for Housing in the Last Year: No     Number of Places Lived in the Last Year: 1     Unstable Housing in the Last Year: No       Family History:  Family History   Adopted: Yes   Problem Relation Name Age of Onset    Hypertension Sister      Hypertension Sister Edward     No Known Problems Daughter      Hypertension Son Vladimir     No Known Problems Son      Skin cancer Neg Hx         Allergies and Medications: (updated and reviewed)  Review of patient's allergies indicates:   Allergen Reactions    Iodinated contrast media      Current Outpatient Medications   Medication Sig Dispense Refill    atorvastatin (LIPITOR) 10 MG tablet Take 1 tablet (10 mg total) by mouth once daily. 90 tablet 3    ketoconazole (NIZORAL) 2 % shampoo Apply topically 3 (three) times a week. Use to wash scalp. Leave on for 5-10 minutes then wash off 120 mL 10    levothyroxine (SYNTHROID) 75 MCG tablet Take 1 tablet (75 mcg total) by mouth before breakfast. 90 tablet 3    losartan (COZAAR) 50 MG tablet Take 1 tablet (50 mg total) by mouth once daily. 90 tablet 3    clobetasoL (TEMOVATE) 0.05 % external solution Apply topically once daily. Use to affected areas for up to 2 weeks then take a 1 week break or decrease to 3 times weekly. Do not apply to groin or face. Use to scalp (Patient not taking: Reported on 10/21/2024) 50 mL 2    tacrolimus (PROTOPIC) 0.1 % ointment Apply topically 2 (two) times daily. Use to itchy rash in armpits and on back (Patient not taking: Reported on 10/21/2024) 60 g 2    triamcinolone acetonide 0.025% (KENALOG) 0.025 % cream Apply topically 2 (two) times daily. Use to affected areas for up to 2 weeks then take a 1 week break or decrease to 3 times weekly. Do not apply to groin or face. Use for rash on upper back and armpits as needed. Do not use for prolonged periods in armpits as it can cause stretch marks and skin thinning (Patient not taking: Reported on  10/21/2024) 80 g 2     No current facility-administered medications for this visit.       Patient Care Team:  Chicho Meléndez MD as PCP - General (Internal Medicine)  Edgard Multani II, MD (Inactive) as Consulting Physician (Endocrinology)  Tatianna Latham MD as Consulting Physician (Physical Medicine and Rehabilitation)  Josh Daily MD (Inactive) as Obstetrician (Obstetrics)  Noé Joyce MA as Care Coordinator  Olive Joe PharmD as Hypertension Digital Medicine Clinician  Chicho Meléndez MD as Hypertension Digital Medicine Responsible Provider (Internal Medicine)         - The patient is given an After Visit Summary that lists all medications with directions, allergies, education, orders placed during this encounter and follow-up instructions.      - I have reviewed the patient's medical information including past medical, family, and social history sections including the medications and allergies.      - We discussed the patient's current medications.     This note was created by combination of typed  and MModal dictation.  Transcription errors may be present.  If there are any questions, please contact me.       David Zambrano NP                n/a

## 2024-10-22 ENCOUNTER — HOSPITAL ENCOUNTER (OUTPATIENT)
Dept: RADIOLOGY | Facility: HOSPITAL | Age: 69
Discharge: HOME | End: 2024-10-22
Attending: NURSE PRACTITIONER
Payer: MEDICARE

## 2024-10-22 ENCOUNTER — TELEPHONE (OUTPATIENT)
Dept: FAMILY MEDICINE | Facility: CLINIC | Age: 69
End: 2024-10-22
Payer: MEDICARE

## 2024-10-22 DIAGNOSIS — R10.11 RIGHT UPPER QUADRANT ABDOMINAL PAIN: ICD-10-CM

## 2024-10-22 PROCEDURE — 74176 CT ABD & PELVIS W/O CONTRAST: CPT | Mod: 26,,, | Performed by: RADIOLOGY

## 2024-10-22 PROCEDURE — 74176 CT ABD & PELVIS W/O CONTRAST: CPT | Mod: TC

## 2024-11-18 ENCOUNTER — PATIENT MESSAGE (OUTPATIENT)
Dept: ADMINISTRATIVE | Facility: HOSPITAL | Age: 69
End: 2024-11-18
Payer: MEDICARE

## 2024-12-22 ENCOUNTER — PATIENT MESSAGE (OUTPATIENT)
Dept: ADMINISTRATIVE | Facility: OTHER | Age: 69
End: 2024-12-22
Payer: MEDICARE

## 2024-12-23 ENCOUNTER — OFFICE VISIT (OUTPATIENT)
Dept: FAMILY MEDICINE | Facility: CLINIC | Age: 69
End: 2024-12-23
Payer: MEDICARE

## 2024-12-23 VITALS
DIASTOLIC BLOOD PRESSURE: 80 MMHG | HEIGHT: 69 IN | OXYGEN SATURATION: 96 % | TEMPERATURE: 98 F | SYSTOLIC BLOOD PRESSURE: 138 MMHG | HEART RATE: 66 BPM | BODY MASS INDEX: 25.73 KG/M2 | WEIGHT: 173.75 LBS

## 2024-12-23 DIAGNOSIS — T23.261A PARTIAL THICKNESS BURN OF BACK OF RIGHT HAND, INITIAL ENCOUNTER: Primary | ICD-10-CM

## 2024-12-23 PROCEDURE — 1160F RVW MEDS BY RX/DR IN RCRD: CPT | Mod: CPTII,S$GLB,, | Performed by: INTERNAL MEDICINE

## 2024-12-23 PROCEDURE — 3079F DIAST BP 80-89 MM HG: CPT | Mod: CPTII,S$GLB,, | Performed by: INTERNAL MEDICINE

## 2024-12-23 PROCEDURE — 3288F FALL RISK ASSESSMENT DOCD: CPT | Mod: CPTII,S$GLB,, | Performed by: INTERNAL MEDICINE

## 2024-12-23 PROCEDURE — 99213 OFFICE O/P EST LOW 20 MIN: CPT | Mod: S$GLB,,, | Performed by: INTERNAL MEDICINE

## 2024-12-23 PROCEDURE — 1159F MED LIST DOCD IN RCRD: CPT | Mod: CPTII,S$GLB,, | Performed by: INTERNAL MEDICINE

## 2024-12-23 PROCEDURE — 1101F PT FALLS ASSESS-DOCD LE1/YR: CPT | Mod: CPTII,S$GLB,, | Performed by: INTERNAL MEDICINE

## 2024-12-23 PROCEDURE — 4010F ACE/ARB THERAPY RXD/TAKEN: CPT | Mod: CPTII,S$GLB,, | Performed by: INTERNAL MEDICINE

## 2024-12-23 PROCEDURE — 3044F HG A1C LEVEL LT 7.0%: CPT | Mod: CPTII,S$GLB,, | Performed by: INTERNAL MEDICINE

## 2024-12-23 PROCEDURE — 3075F SYST BP GE 130 - 139MM HG: CPT | Mod: CPTII,S$GLB,, | Performed by: INTERNAL MEDICINE

## 2024-12-23 PROCEDURE — 99999 PR PBB SHADOW E&M-EST. PATIENT-LVL III: CPT | Mod: PBBFAC,,, | Performed by: INTERNAL MEDICINE

## 2024-12-23 PROCEDURE — 1125F AMNT PAIN NOTED PAIN PRSNT: CPT | Mod: CPTII,S$GLB,, | Performed by: INTERNAL MEDICINE

## 2024-12-23 PROCEDURE — 3008F BODY MASS INDEX DOCD: CPT | Mod: CPTII,S$GLB,, | Performed by: INTERNAL MEDICINE

## 2024-12-23 RX ORDER — SILVER SULFADIAZINE 10 G/1000G
CREAM TOPICAL DAILY
Qty: 50 G | Refills: 0 | Status: SHIPPED | OUTPATIENT
Start: 2024-12-23
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
## 2025-01-24 ENCOUNTER — LAB VISIT (OUTPATIENT)
Dept: LAB | Facility: HOSPITAL | Age: 70
End: 2025-01-24
Attending: INTERNAL MEDICINE
Payer: MEDICARE

## 2025-01-24 DIAGNOSIS — E78.5 DYSLIPIDEMIA: ICD-10-CM

## 2025-01-24 DIAGNOSIS — E06.3 HYPOTHYROIDISM DUE TO HASHIMOTO'S THYROIDITIS: Chronic | ICD-10-CM

## 2025-01-24 DIAGNOSIS — Z00.00 NORMAL PHYSICAL EXAM: ICD-10-CM

## 2025-01-24 LAB
ALBUMIN SERPL BCP-MCNC: 3.9 G/DL (ref 3.5–5.2)
ALP SERPL-CCNC: 72 U/L (ref 40–150)
ALT SERPL W/O P-5'-P-CCNC: 14 U/L (ref 10–44)
ANION GAP SERPL CALC-SCNC: 10 MMOL/L (ref 8–16)
AST SERPL-CCNC: 18 U/L (ref 10–40)
BILIRUB SERPL-MCNC: 0.4 MG/DL (ref 0.1–1)
BUN SERPL-MCNC: 20 MG/DL (ref 8–23)
CALCIUM SERPL-MCNC: 9.5 MG/DL (ref 8.7–10.5)
CHLORIDE SERPL-SCNC: 106 MMOL/L (ref 95–110)
CHOLEST SERPL-MCNC: 155 MG/DL (ref 120–199)
CHOLEST/HDLC SERPL: 2.4 {RATIO} (ref 2–5)
CO2 SERPL-SCNC: 26 MMOL/L (ref 23–29)
CREAT SERPL-MCNC: 0.8 MG/DL (ref 0.5–1.4)
ERYTHROCYTE [DISTWIDTH] IN BLOOD BY AUTOMATED COUNT: 13.3 % (ref 11.5–14.5)
EST. GFR  (NO RACE VARIABLE): >60 ML/MIN/1.73 M^2
GLUCOSE SERPL-MCNC: 77 MG/DL (ref 70–110)
HCT VFR BLD AUTO: 41 % (ref 37–48.5)
HDLC SERPL-MCNC: 64 MG/DL (ref 40–75)
HDLC SERPL: 41.3 % (ref 20–50)
HGB BLD-MCNC: 12.8 G/DL (ref 12–16)
LDLC SERPL CALC-MCNC: 75.6 MG/DL (ref 63–159)
MCH RBC QN AUTO: 30.8 PG (ref 27–31)
MCHC RBC AUTO-ENTMCNC: 31.2 G/DL (ref 32–36)
MCV RBC AUTO: 99 FL (ref 82–98)
NONHDLC SERPL-MCNC: 91 MG/DL
PLATELET # BLD AUTO: 253 K/UL (ref 150–450)
PMV BLD AUTO: 9.7 FL (ref 9.2–12.9)
POTASSIUM SERPL-SCNC: 4 MMOL/L (ref 3.5–5.1)
PROT SERPL-MCNC: 7.2 G/DL (ref 6–8.4)
RBC # BLD AUTO: 4.16 M/UL (ref 4–5.4)
SODIUM SERPL-SCNC: 142 MMOL/L (ref 136–145)
TRIGL SERPL-MCNC: 77 MG/DL (ref 30–150)
TSH SERPL DL<=0.005 MIU/L-ACNC: 3.64 UIU/ML (ref 0.4–4)
WBC # BLD AUTO: 5.31 K/UL (ref 3.9–12.7)

## 2025-01-24 PROCEDURE — 84443 ASSAY THYROID STIM HORMONE: CPT | Performed by: INTERNAL MEDICINE

## 2025-01-24 PROCEDURE — 36415 COLL VENOUS BLD VENIPUNCTURE: CPT | Mod: PO | Performed by: INTERNAL MEDICINE

## 2025-01-24 PROCEDURE — 80053 COMPREHEN METABOLIC PANEL: CPT | Performed by: INTERNAL MEDICINE

## 2025-01-24 PROCEDURE — 80061 LIPID PANEL: CPT | Performed by: INTERNAL MEDICINE

## 2025-01-24 PROCEDURE — 85027 COMPLETE CBC AUTOMATED: CPT | Performed by: INTERNAL MEDICINE

## 2025-01-28 ENCOUNTER — OFFICE VISIT (OUTPATIENT)
Dept: FAMILY MEDICINE | Facility: CLINIC | Age: 70
End: 2025-01-28
Payer: MEDICARE

## 2025-01-28 VITALS
HEIGHT: 69 IN | SYSTOLIC BLOOD PRESSURE: 130 MMHG | WEIGHT: 175.13 LBS | BODY MASS INDEX: 25.94 KG/M2 | TEMPERATURE: 98 F | DIASTOLIC BLOOD PRESSURE: 80 MMHG | OXYGEN SATURATION: 97 % | HEART RATE: 73 BPM

## 2025-01-28 DIAGNOSIS — Z00.00 NORMAL PHYSICAL EXAM: Primary | ICD-10-CM

## 2025-01-28 DIAGNOSIS — G89.29 CHRONIC MIDLINE LOW BACK PAIN WITHOUT SCIATICA: ICD-10-CM

## 2025-01-28 DIAGNOSIS — Z78.0 ASYMPTOMATIC MENOPAUSAL STATE: ICD-10-CM

## 2025-01-28 DIAGNOSIS — M85.89 OSTEOPENIA OF MULTIPLE SITES: ICD-10-CM

## 2025-01-28 DIAGNOSIS — E78.5 DYSLIPIDEMIA: ICD-10-CM

## 2025-01-28 DIAGNOSIS — D12.6 TUBULAR ADENOMA OF COLON: ICD-10-CM

## 2025-01-28 DIAGNOSIS — I10 ESSENTIAL HYPERTENSION: ICD-10-CM

## 2025-01-28 DIAGNOSIS — M54.50 CHRONIC MIDLINE LOW BACK PAIN WITHOUT SCIATICA: ICD-10-CM

## 2025-01-28 DIAGNOSIS — E06.3 HYPOTHYROIDISM DUE TO HASHIMOTO'S THYROIDITIS: Chronic | ICD-10-CM

## 2025-01-28 DIAGNOSIS — Z23 NEEDS FLU SHOT: ICD-10-CM

## 2025-01-28 PROCEDURE — 90653 IIV ADJUVANT VACCINE IM: CPT | Mod: S$GLB,,, | Performed by: INTERNAL MEDICINE

## 2025-01-28 PROCEDURE — 99397 PER PM REEVAL EST PAT 65+ YR: CPT | Mod: 25,S$GLB,, | Performed by: INTERNAL MEDICINE

## 2025-01-28 PROCEDURE — 1101F PT FALLS ASSESS-DOCD LE1/YR: CPT | Mod: CPTII,S$GLB,, | Performed by: INTERNAL MEDICINE

## 2025-01-28 PROCEDURE — 1159F MED LIST DOCD IN RCRD: CPT | Mod: CPTII,S$GLB,, | Performed by: INTERNAL MEDICINE

## 2025-01-28 PROCEDURE — 1126F AMNT PAIN NOTED NONE PRSNT: CPT | Mod: CPTII,S$GLB,, | Performed by: INTERNAL MEDICINE

## 2025-01-28 PROCEDURE — 3008F BODY MASS INDEX DOCD: CPT | Mod: CPTII,S$GLB,, | Performed by: INTERNAL MEDICINE

## 2025-01-28 PROCEDURE — 99999 PR PBB SHADOW E&M-EST. PATIENT-LVL IV: CPT | Mod: PBBFAC,,, | Performed by: INTERNAL MEDICINE

## 2025-01-28 PROCEDURE — 3288F FALL RISK ASSESSMENT DOCD: CPT | Mod: CPTII,S$GLB,, | Performed by: INTERNAL MEDICINE

## 2025-01-28 PROCEDURE — 3079F DIAST BP 80-89 MM HG: CPT | Mod: CPTII,S$GLB,, | Performed by: INTERNAL MEDICINE

## 2025-01-28 PROCEDURE — G0008 ADMIN INFLUENZA VIRUS VAC: HCPCS | Mod: S$GLB,,, | Performed by: INTERNAL MEDICINE

## 2025-01-28 PROCEDURE — 3075F SYST BP GE 130 - 139MM HG: CPT | Mod: CPTII,S$GLB,, | Performed by: INTERNAL MEDICINE

## 2025-01-28 PROCEDURE — 1160F RVW MEDS BY RX/DR IN RCRD: CPT | Mod: CPTII,S$GLB,, | Performed by: INTERNAL MEDICINE

## 2025-01-28 RX ORDER — ATORVASTATIN CALCIUM 10 MG/1
10 TABLET, FILM COATED ORAL DAILY
Qty: 90 TABLET | Refills: 3 | Status: SHIPPED | OUTPATIENT
Start: 2025-01-28

## 2025-01-28 RX ORDER — LEVOTHYROXINE SODIUM 75 UG/1
75 TABLET ORAL
Qty: 90 TABLET | Refills: 3 | Status: SHIPPED | OUTPATIENT
Start: 2025-01-28

## 2025-01-28 RX ORDER — LOSARTAN POTASSIUM 50 MG/1
50 TABLET ORAL DAILY
Qty: 90 TABLET | Refills: 3 | Status: SHIPPED | OUTPATIENT
Start: 2025-01-28
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
## 2025-01-28 NOTE — PATIENT INSTRUCTIONS
You should be getting 1200 mg of calcium every day, either in your food or with a supplement.  Dairy (milk, cheese, yogurt, etc.) is the main source of calcium. A serving of dairy has about 300-400 mg of calcium.  So you should be getting 3-4 servings of dairy every day.  If you are not able to, then you should take a calcium supplement to get to 1200 mg total.  For example, if you drink 2 cups of milk in a day (600 mg of calcium), you should take a calcium pill 600 mg a day.  If you do not eat or drink any dairy, you should take 2 calcium pills to make 1200 mg a day.    Here's a link to some more information about calcium, including the different types of supplements:  Calcium and calcium supplements: Achieving the right balance - [facility]

## 2025-02-07 ENCOUNTER — HOSPITAL ENCOUNTER (OUTPATIENT)
Dept: RADIOLOGY | Facility: CLINIC | Age: 70
Discharge: HOME | End: 2025-02-07
Attending: INTERNAL MEDICINE
Payer: MEDICARE

## 2025-02-07 DIAGNOSIS — Z78.0 ASYMPTOMATIC MENOPAUSAL STATE: ICD-10-CM

## 2025-02-07 PROCEDURE — 77080 DXA BONE DENSITY AXIAL: CPT | Mod: 26,,, | Performed by: INTERNAL MEDICINE

## 2025-02-07 PROCEDURE — 77080 DXA BONE DENSITY AXIAL: CPT | Mod: TC,PO

## 2025-02-13 NOTE — PROGRESS NOTES
02/11/2025 DEXA L-spine -1.4, femoral neck-0.2, total hip -0.5.  Compared to previous BMD L-spine stable, total hip declined by-4.6%.  FRAX score 0.9/7.7%.  Osteopenia.    Results to patient.  Calcium and vitamin-D.  Repeat 2 years

## 2025-02-19 NOTE — PROGRESS NOTES
24-2 sitafast hvf done today for Dr. Hurtado.  Pt denies any latex or adhesive allergies.    mhk    Fixation --- good OU  Cooperation -- good OU  Reliability --- good OU             PATIENT IS STILL IN HOUSE

## 2025-04-07 ENCOUNTER — CLINICAL SUPPORT (OUTPATIENT)
Dept: OPHTHALMOLOGY | Facility: CLINIC | Age: 70
End: 2025-04-07
Payer: MEDICARE

## 2025-04-07 ENCOUNTER — OFFICE VISIT (OUTPATIENT)
Dept: OPHTHALMOLOGY | Facility: CLINIC | Age: 70
End: 2025-04-07
Payer: MEDICARE

## 2025-04-07 DIAGNOSIS — H40.1131 PRIMARY OPEN-ANGLE GLAUCOMA, BILATERAL, MILD STAGE: ICD-10-CM

## 2025-04-07 DIAGNOSIS — H40.1131 PRIMARY OPEN-ANGLE GLAUCOMA, BILATERAL, MILD STAGE: Primary | ICD-10-CM

## 2025-04-07 DIAGNOSIS — H02.054 TRICHIASIS OF LEFT UPPER EYELID: ICD-10-CM

## 2025-04-07 PROCEDURE — 99999 PR PBB SHADOW E&M-EST. PATIENT-LVL III: CPT | Mod: PBBFAC,,, | Performed by: OPHTHALMOLOGY

## 2025-04-07 PROCEDURE — 1126F AMNT PAIN NOTED NONE PRSNT: CPT | Mod: CPTII,S$GLB,, | Performed by: OPHTHALMOLOGY

## 2025-04-07 PROCEDURE — 1159F MED LIST DOCD IN RCRD: CPT | Mod: CPTII,S$GLB,, | Performed by: OPHTHALMOLOGY

## 2025-04-07 PROCEDURE — 99213 OFFICE O/P EST LOW 20 MIN: CPT | Mod: S$GLB,,, | Performed by: OPHTHALMOLOGY

## 2025-04-07 PROCEDURE — 4010F ACE/ARB THERAPY RXD/TAKEN: CPT | Mod: CPTII,S$GLB,, | Performed by: OPHTHALMOLOGY

## 2025-04-07 PROCEDURE — 3288F FALL RISK ASSESSMENT DOCD: CPT | Mod: CPTII,S$GLB,, | Performed by: OPHTHALMOLOGY

## 2025-04-07 PROCEDURE — 1101F PT FALLS ASSESS-DOCD LE1/YR: CPT | Mod: CPTII,S$GLB,, | Performed by: OPHTHALMOLOGY

## 2025-04-07 PROCEDURE — 1160F RVW MEDS BY RX/DR IN RCRD: CPT | Mod: CPTII,S$GLB,, | Performed by: OPHTHALMOLOGY

## 2025-04-07 PROCEDURE — G2211 COMPLEX E/M VISIT ADD ON: HCPCS | Mod: S$GLB,,, | Performed by: OPHTHALMOLOGY

## 2025-04-07 NOTE — PROGRESS NOTES
HPI     Follow-up     Additional comments: Fu 6 months, HVF, IOP check. DEnies eye pain today.   Does not use any eye gtts currently.           Last edited by Leora Jaime on 4/7/2025  9:30 AM.            Assessment /Plan     For exam results, see Encounter Report.    Primary open-angle glaucoma, bilateral, mild stage    Trichiasis of left upper eyelid      1. Primary open-angle glaucoma, bilateral, mild stage (Primary)  Unknown famhx  Thin pachy    Very high IOP OD>OS tmax 38/28  Optic nerves asymmetry OD>OS  OCT progression OD, recently stable, Stable OS  HVF essentially normal and stable  S/p SLT 2022 OU, excellent response  Target IOP low 20s    IOP at target  Continue to monitor off meds    Fu 6 months, DFE, OCT NFL    Visit today is associated with current or anticipated ongoing medical care related to this patients single serious and complex condition, glaucoma.      2. Trichiasis of left upper eyelid  Pt not bothered, observe

## 2025-05-20 ENCOUNTER — OFFICE VISIT (OUTPATIENT)
Dept: FAMILY MEDICINE | Facility: CLINIC | Age: 70
End: 2025-05-20
Payer: MEDICARE

## 2025-05-20 VITALS
OXYGEN SATURATION: 96 % | HEIGHT: 69 IN | SYSTOLIC BLOOD PRESSURE: 128 MMHG | BODY MASS INDEX: 25.94 KG/M2 | HEART RATE: 73 BPM | WEIGHT: 175.13 LBS | DIASTOLIC BLOOD PRESSURE: 76 MMHG | TEMPERATURE: 98 F

## 2025-05-20 DIAGNOSIS — B96.89 BACTERIAL RESPIRATORY INFECTION: Primary | ICD-10-CM

## 2025-05-20 DIAGNOSIS — Z12.31 ENCOUNTER FOR SCREENING MAMMOGRAM FOR MALIGNANT NEOPLASM OF BREAST: ICD-10-CM

## 2025-05-20 DIAGNOSIS — J98.8 BACTERIAL RESPIRATORY INFECTION: Primary | ICD-10-CM

## 2025-05-20 PROCEDURE — 3074F SYST BP LT 130 MM HG: CPT | Mod: CPTII,S$GLB,, | Performed by: INTERNAL MEDICINE

## 2025-05-20 PROCEDURE — 1101F PT FALLS ASSESS-DOCD LE1/YR: CPT | Mod: CPTII,S$GLB,, | Performed by: INTERNAL MEDICINE

## 2025-05-20 PROCEDURE — 3078F DIAST BP <80 MM HG: CPT | Mod: CPTII,S$GLB,, | Performed by: INTERNAL MEDICINE

## 2025-05-20 PROCEDURE — 99999 PR PBB SHADOW E&M-EST. PATIENT-LVL IV: CPT | Mod: PBBFAC,,, | Performed by: INTERNAL MEDICINE

## 2025-05-20 PROCEDURE — 4010F ACE/ARB THERAPY RXD/TAKEN: CPT | Mod: CPTII,S$GLB,, | Performed by: INTERNAL MEDICINE

## 2025-05-20 PROCEDURE — 3288F FALL RISK ASSESSMENT DOCD: CPT | Mod: CPTII,S$GLB,, | Performed by: INTERNAL MEDICINE

## 2025-05-20 PROCEDURE — 1159F MED LIST DOCD IN RCRD: CPT | Mod: CPTII,S$GLB,, | Performed by: INTERNAL MEDICINE

## 2025-05-20 PROCEDURE — 1126F AMNT PAIN NOTED NONE PRSNT: CPT | Mod: CPTII,S$GLB,, | Performed by: INTERNAL MEDICINE

## 2025-05-20 PROCEDURE — 99213 OFFICE O/P EST LOW 20 MIN: CPT | Mod: S$GLB,,, | Performed by: INTERNAL MEDICINE

## 2025-05-20 PROCEDURE — 1160F RVW MEDS BY RX/DR IN RCRD: CPT | Mod: CPTII,S$GLB,, | Performed by: INTERNAL MEDICINE

## 2025-05-20 PROCEDURE — 3008F BODY MASS INDEX DOCD: CPT | Mod: CPTII,S$GLB,, | Performed by: INTERNAL MEDICINE

## 2025-05-20 RX ORDER — BENZONATATE 100 MG/1
100 CAPSULE ORAL 3 TIMES DAILY PRN
Qty: 30 CAPSULE | Refills: 0 | Status: SHIPPED | OUTPATIENT
Start: 2025-05-20 | End: 2025-05-30

## 2025-05-20 RX ORDER — DOXYCYCLINE 100 MG/1
100 CAPSULE ORAL EVERY 12 HOURS
Qty: 14 CAPSULE | Refills: 0 | Status: SHIPPED | OUTPATIENT
Start: 2025-05-20 | End: 2025-05-27

## 2025-05-20 RX ORDER — IPRATROPIUM BROMIDE 21 UG/1
2 SPRAY, METERED NASAL 3 TIMES DAILY
Qty: 30 ML | Refills: 0 | Status: SHIPPED | OUTPATIENT
Start: 2025-05-20

## 2025-05-20 NOTE — PROGRESS NOTES
This note was created by combination of typed  and M-Modal dictation.  Transcription errors may be present.   This note was also generated with the assistance of ambient listening technology. Verbal consent was obtained by the patient and accompanying visitor(s) for the recording of patient appointment to facilitate this note. I attest to having reviewed and edited the generated note for accuracy, though some syntax or spelling errors may persist. Please contact the author of this note for any clarification.    Assessment and Plan:   Assessment and Plan    Assessment & Plan  Bacterial respiratory infection  05/20/2025: Ongoing for about a week, minimal improvement, normal exam, treat for viral with supportive care benzonatate and Atrovent nasal spray.  But if her symptoms do not improve and she is going out of town this weekend, I have sent in a prescription for doxycycline but advised her not to take unless symptoms persist  Orders:    doxycycline (VIBRAMYCIN) 100 MG Cap; Take 1 capsule (100 mg total) by mouth every 12 (twelve) hours. for 7 days    ipratropium (ATROVENT) 21 mcg (0.03 %) nasal spray; 2 sprays by Each Nostril route 3 (three) times daily.    benzonatate (TESSALON) 100 MG capsule; Take 1 capsule (100 mg total) by mouth 3 (three) times daily as needed for Cough.    Encounter for screening mammogram for malignant neoplasm of breast  05/20/2025: Screening mammogram ordered she will schedule at her convenience  Orders:    Mammo Digital Screening Bilat; Future        There are no discontinued medications.    meds sent this encounter:     Medications Ordered This Encounter   Medications    benzonatate (TESSALON) 100 MG capsule     Sig: Take 1 capsule (100 mg total) by mouth 3 (three) times daily as needed for Cough.     Dispense:  30 capsule     Refill:  0    doxycycline (VIBRAMYCIN) 100 MG Cap     Sig: Take 1 capsule (100 mg total) by mouth every 12 (twelve) hours. for 7 days     Dispense:  14  capsule     Refill:  0    ipratropium (ATROVENT) 21 mcg (0.03 %) nasal spray     Si sprays by Each Nostril route 3 (three) times daily.     Dispense:  30 mL     Refill:  0        Follow Up:   Future Appointments   Date Time Provider Department Center   2025 10:00 AM Chicho Meléndez MD [facility] [place]   10/13/2025  9:30 AM Josue Hurtado MD [address]   2026  7:00 AM LAB, [facility]   2026 10:40 AM Chicho Meléndez MD [facility] [place]         Subjective:   Subjective   Chief Complaint   Patient presents with    Diarrhea    Cough    watery eyes    Sore Throat       HPI  Radha is a 69 y.o. female.    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     Employer: RETIRED   Tobacco Use    Smoking status: Former     Current packs/day: 0.00     Average packs/day: 0.3 packs/day for 15.0 years (3.8 ttl pk-yrs)     Types: Cigarettes     Quit date: 10/12/2004     Years since quittin.6    Smokeless tobacco: Never   Substance and Sexual Activity    Alcohol use: Yes     Alcohol/week: 5.0 standard drinks of alcohol     Types: 5 Glasses of wine per week     Comment: occassionally    Drug use: No    Sexual activity: Not Currently     Partners: Male     Birth control/protection: Partner-Vasectomy, Post-menopausal     Social Drivers of Health     Financial Resource Strain: Low Risk  (2024)    Overall Financial Resource Strain (CARDIA)     Difficulty of Paying Living Expenses: Not hard at all   Food Insecurity: No Food Insecurity (2024)    Hunger Vital Sign     Worried About Running Out of Food in the Last Year: Never true     Ran Out of Food in the Last Year: Never true   Transportation Needs: No Transportation Needs (2023)    PRAPARE - Transportation     Lack of Transportation (Medical): No     Lack of Transportation (Non-Medical): No   Physical Activity: Inactive  (12/22/2024)    Exercise Vital Sign     Days of Exercise per Week: 0 days     Minutes of Exercise per Session: 0 min   Stress: No Stress Concern Present (12/22/2024)    [employer] [place] [employer] - Occupational Stress Questionnaire     Feeling of Stress : Only a little   Housing Stability: Low Risk  (12/2/2023)    Housing Stability Vital Sign     Unable to Pay for Housing in the Last Year: No     Number of Places Lived in the Last Year: 1     Unstable Housing in the Last Year: No       No LMP recorded. Patient is postmenopausal.    Last appointment with this clinic was 1/28/2025. Last visit with me 1/28/2025   To summarize last visit and events leading up to today:  Hypertension needs to monitor with digital monitoring  Hyperlipidemia 01/2023 started on statin   Hypothyroid on levothyroxine  Migraines  History of TA  Chronic back pain  Osteopenia DEXA scan 2022  10/12/2023 colonoscopy internal hemorrhoids.  Repeat 5 years     Last visit with me 1/26/24  HTN stable  Lipid stable/statin  Thyroid stable on LT     Infrequent recent tests with digital HTN monitoring  Elevated BP; episode of lightheaded     10/9/24 e visit for HTN, increased losartan     10/21/24 saw NP for RUQ pain.   CT  No evidence of urolithiasis or obstructive uropathy.  Probable small right renal cyst.  Cholelithiasis.      Labs CBC, CMP, TSH, FT4 WNL     Last visit 12/23/24  R hand burn. Silvadene cream    Last visit 01/28/2025 for physical  Lipid statin   Hypertension stable   Hypothyroid stable on current dose   Chronic back pain stable not on medication  Osteopenia update DEXA    02/11/2025 DEXA L-spine -1.4, femoral neck-0.2, total hip -0.5.  Compared to previous BMD L-spine stable, total hip declined by-4.6%.  FRAX score 0.9/7.7%.  Osteopenia.    Today's visit:    History of Present Illness    SOCIAL HISTORY:  - Marital status:     HPI:  Radha reports sinus symptoms and diarrhea for about a week, with onset since last  Wednesday. She initially woke up with a scratchy throat, which has improved but is still present. The cough has worsened over time. The diarrhea started concurrently with other symptoms and is described as severe, with stomach gurgling. She has been taking NyQuil twice nightly as a sleep aid and DayQuil during the day. For diarrhea, she uses Pepto-Bismol, which helps slow it down, particularly in the morning and evening. She denies any fever throughout the illness. Compared to last week, she feels almost the same, with less scratchy throat but more coughing. Her , who works at a hospital, is not sick, but might have brought something home. She did not take any medication today to better assess her condition.    She denies chills, chest congestion, shortness of breath, and any problems with blood pressure or heart rate.    MEDICATIONS:  - Lipitor  - Levothyroxine (thyroid medication)  - Losartan  - NyQuil, twice nightly for sleep  - DayQuil, during the day  - Pepto-Bismol, morning and evening for diarrhea    ROS:  General: no fever  ENT: reports nasal congestion, reports sore throat  Respiratory: reports cough, no shortness of breath  Gastrointestinal: reports diarrhea         Patient Care Team:  Chicho Meléndez MD as PCP - General (Internal Medicine)  Edgard Multani II, MD (Inactive) as Consulting Physician (Endocrinology)  Tatianna Latham MD as Consulting Physician (Physical Medicine and Rehabilitation)  Josh Daily MD (Inactive) as Obstetrician (Obstetrics)  Olive Joe, Silvia as Hypertension Digital Medicine Clinician  Chicho Meléndez MD as Hypertension Digital Medicine Responsible Provider (Internal Medicine)  Medicare, Digital Medicine as Hypertension Digital Medicine Contract      Patient Active Problem List    Diagnosis Date Noted    Acute pain of right shoulder 07/17/2023    Dyslipidemia 01/26/2023    Decreased ROM of trunk and back 09/01/2022    Decreased strength of trunk and  back 09/01/2022    Osteopenia of multiple sites 02/10/2022     02/08/2022 DEXA scan L-spine-1.4, total hip-0.2, femoral neck 0.1.  FRAX score 0.5/6.9%.  Osteopenia.  Compared to previous, 5% decline total hip.  02/11/2025 DEXA L-spine -1.4, femoral neck-0.2, total hip -0.5.  Compared to previous BMD L-spine stable, total hip declined by-4.6%.  FRAX score 0.9/7.7%.  Osteopenia.      Palpitations 02/03/2021    History of COVID-19 02/03/2021    Chronic midline low back pain without sciatica 08/10/2016     8/10/2016 MRI L spine: 1. Significant degenerative disc disease at L5-S1 resulting in mild bilateral neuroforaminal narrowing.  Otherwise mild multilevel lumbar spondylosis. 2. Cholelithiasis.  MRI of T spine showed: 1. Mild degenerative changes of the thoracic spine. No spinal canal stenosis or neuroforaminal narrowing.  Spinal cord signal and morphology are maintained.2.  Cholelithiasis. 3.  Subcentimeter T2 hyperintense lesion at the liver dome incompletely characterized.      Hypothyroidism due to Hashimoto's thyroiditis 06/16/2015    Migraine syndrome 05/19/2015     Symptoms - tightness in the back of the neck and scotomas - really more opthalmic migraine.  5/18/2015 MRI brain neg  5/18/2015 carotid US negative.      Essential hypertension 03/25/2014     2/10/2021 TTE LV normal size with eccentric hypertrophy and normal systolic function LVEF 60%.  Indeterminate diastolic function.  Normal RV size and systolic function.  No pulmonary hypertension.        Tubular adenoma of colon 2013 and 2018; 10/12/2023 colonoscopy internal hemorrhoids.  Repeat 5 years 03/25/2014 1/11/2013 colonoscopy with sigmoid hyperplastic polyp with suggestion of focal early adenomatous change; melanosis coli.  3/23/2018 colonoscopy rectal sessile tubular adenoma  10/12/2023 colonoscopy internal hemorrhoids.  Repeat 5 years      Alopecia areata 03/25/2014    Asymptomatic cholelithiasis 10/13/2012    Lichen planus 10/13/2012    Scoliosis  "10/13/2012       PAST MEDICAL PROBLEMS, PAST SURGICAL HISTORY: please see relevant portions of the electronic medical record    ALLERGIES AND MEDICATIONS: updated and reviewed.  Medication List with Changes/Refills   Current Medications    ATORVASTATIN (LIPITOR) 10 MG TABLET    Take 1 tablet (10 mg total) by mouth once daily.    CLOBETASOL (TEMOVATE) 0.05 % EXTERNAL SOLUTION    Apply topically once daily. Use to affected areas for up to 2 weeks then take a 1 week break or decrease to 3 times weekly. Do not apply to groin or face. Use to scalp    KETOCONAZOLE (NIZORAL) 2 % SHAMPOO    Apply topically 3 (three) times a week. Use to wash scalp. Leave on for 5-10 minutes then wash off    LEVOTHYROXINE (SYNTHROID) 75 MCG TABLET    Take 1 tablet (75 mcg total) by mouth before breakfast.    LOSARTAN (COZAAR) 50 MG TABLET    Take 1 tablet (50 mg total) by mouth once daily.    SILVER SULFADIAZINE 1% (SILVADENE) 1 % CREAM    Apply topically once daily.    TRIAMCINOLONE ACETONIDE 0.025% (KENALOG) 0.025 % CREAM    Apply topically 2 (two) times daily. Use to affected areas for up to 2 weeks then take a 1 week break or decrease to 3 times weekly. Do not apply to groin or face. Use for rash on upper back and armpits as needed. Do not use for prolonged periods in armpits as it can cause stretch marks and skin thinning         Objective:   Objective   Physical Exam   Vitals:    05/20/25 0949   BP: 128/76   BP Location: Right arm   Patient Position: Sitting   Pulse: 73   Temp: 97.9 °F (36.6 °C)   TempSrc: Oral   SpO2: 96%   Weight: 79.5 kg (175 lb 2.5 oz)   Height: 5' 9" (1.753 m)    Body mass index is 25.87 kg/m².  Weight: 79.5 kg (175 lb 2.5 oz)   Height: 5' 9" (175.3 cm)     Physical Exam  Constitutional:       Appearance: She is well-developed.   HENT:      Head: Normocephalic.      Right Ear: Tympanic membrane and ear canal normal.      Left Ear: Tympanic membrane and ear canal normal.      Mouth/Throat:      Pharynx: No " oropharyngeal exudate or posterior oropharyngeal erythema.      Comments: Cobblestoning present, tonsils surgically absent  Eyes:      General: No scleral icterus.        Right eye: No discharge.         Left eye: No discharge.   Cardiovascular:      Rate and Rhythm: Normal rate and regular rhythm.      Heart sounds: Normal heart sounds.   Pulmonary:      Effort: Pulmonary effort is normal.      Breath sounds: Normal breath sounds. No wheezing.   Musculoskeletal:      Cervical back: Neck supple.   Lymphadenopathy:      Cervical: No cervical adenopathy.   Skin:     General: Skin is warm and dry.   Neurological:      Mental Status: She is alert and oriented to person, place, and time.   Psychiatric:         Behavior: Behavior normal.

## 2025-07-15 ENCOUNTER — HOSPITAL ENCOUNTER (OUTPATIENT)
Dept: RADIOLOGY | Facility: HOSPITAL | Age: 70
Discharge: HOME | End: 2025-07-15
Attending: NURSE PRACTITIONER
Payer: MEDICARE

## 2025-07-15 ENCOUNTER — OFFICE VISIT (OUTPATIENT)
Dept: PAIN MEDICINE | Facility: CLINIC | Age: 70
End: 2025-07-15
Payer: MEDICARE

## 2025-07-15 VITALS
HEIGHT: 69 IN | BODY MASS INDEX: 26.43 KG/M2 | DIASTOLIC BLOOD PRESSURE: 85 MMHG | HEART RATE: 66 BPM | SYSTOLIC BLOOD PRESSURE: 139 MMHG | WEIGHT: 178.44 LBS

## 2025-07-15 DIAGNOSIS — G89.29 CHRONIC RIGHT SHOULDER PAIN: ICD-10-CM

## 2025-07-15 DIAGNOSIS — M79.18 MYOFASCIAL PAIN ON RIGHT SIDE: ICD-10-CM

## 2025-07-15 DIAGNOSIS — M25.511 CHRONIC RIGHT SHOULDER PAIN: ICD-10-CM

## 2025-07-15 DIAGNOSIS — S13.9XXA ACUTE NECK SPRAIN, INITIAL ENCOUNTER: Primary | ICD-10-CM

## 2025-07-15 DIAGNOSIS — S13.9XXA ACUTE NECK SPRAIN, INITIAL ENCOUNTER: ICD-10-CM

## 2025-07-15 PROCEDURE — 72040 X-RAY EXAM NECK SPINE 2-3 VW: CPT | Mod: TC,FY

## 2025-07-15 PROCEDURE — 4010F ACE/ARB THERAPY RXD/TAKEN: CPT | Mod: CPTII,S$GLB,, | Performed by: NURSE PRACTITIONER

## 2025-07-15 PROCEDURE — 73030 X-RAY EXAM OF SHOULDER: CPT | Mod: 26,RT,, | Performed by: RADIOLOGY

## 2025-07-15 PROCEDURE — 72040 X-RAY EXAM NECK SPINE 2-3 VW: CPT | Mod: 26,,, | Performed by: RADIOLOGY

## 2025-07-15 PROCEDURE — 99204 OFFICE O/P NEW MOD 45 MIN: CPT | Mod: S$GLB,,, | Performed by: NURSE PRACTITIONER

## 2025-07-15 PROCEDURE — 73030 X-RAY EXAM OF SHOULDER: CPT | Mod: TC,FY,RT

## 2025-07-15 PROCEDURE — 99999 PR PBB SHADOW E&M-EST. PATIENT-LVL IV: CPT | Mod: PBBFAC,,, | Performed by: NURSE PRACTITIONER

## 2025-07-15 PROCEDURE — G2211 COMPLEX E/M VISIT ADD ON: HCPCS | Mod: S$GLB,,, | Performed by: NURSE PRACTITIONER

## 2025-07-15 PROCEDURE — 1159F MED LIST DOCD IN RCRD: CPT | Mod: CPTII,S$GLB,, | Performed by: NURSE PRACTITIONER

## 2025-07-15 PROCEDURE — 1160F RVW MEDS BY RX/DR IN RCRD: CPT | Mod: CPTII,S$GLB,, | Performed by: NURSE PRACTITIONER

## 2025-07-15 PROCEDURE — 3008F BODY MASS INDEX DOCD: CPT | Mod: CPTII,S$GLB,, | Performed by: NURSE PRACTITIONER

## 2025-07-15 PROCEDURE — 1125F AMNT PAIN NOTED PAIN PRSNT: CPT | Mod: CPTII,S$GLB,, | Performed by: NURSE PRACTITIONER

## 2025-07-15 PROCEDURE — 3075F SYST BP GE 130 - 139MM HG: CPT | Mod: CPTII,S$GLB,, | Performed by: NURSE PRACTITIONER

## 2025-07-15 PROCEDURE — 3079F DIAST BP 80-89 MM HG: CPT | Mod: CPTII,S$GLB,, | Performed by: NURSE PRACTITIONER

## 2025-07-15 RX ORDER — CYCLOBENZAPRINE HCL 5 MG
5 TABLET ORAL NIGHTLY
Qty: 30 TABLET | Refills: 0 | Status: SHIPPED | OUTPATIENT
Start: 2025-07-15 | End: 2025-08-14

## 2025-07-15 RX ORDER — CELECOXIB 200 MG/1
200 CAPSULE ORAL 2 TIMES DAILY
Qty: 28 CAPSULE | Refills: 0 | Status: SHIPPED | OUTPATIENT
Start: 2025-07-15 | End: 2025-07-29

## 2025-07-15 NOTE — PROGRESS NOTES
[facility] - New Patient Evaluation    Referred by: Dr. Chicho Meléndez   Reason for referral: * No diagnoses found *     CC:   Chief Complaint   Patient presents with    Neck Pain    Shoulder Pain     Right          7/15/2025     3:00 PM   Last 3 PDI Scores   Pain Disability Index (PDI) 19       Subjective 07/15/2025:   Radha Fraga is a 69 y.o. female who presents complaining of right side shoulder and neck pain that began 5 days ago      CHIEF COMPLAINT:  Right shoulder and neck pain    HPI:  Patient presents with right shoulder and neck pain that started approximately 5 days ago. Pain radiates from the right shoulder into the neck bilaterally, described as burning, particularly in the back of the neck and shoulder area. She reports the pain as intense and burning. Pain is intermittent, always achy, and feels like a bruise in the front of the neck. Raising the arm aggravates the pain, especially in the shoulder area. Mitigating factors include staying still and taking ibuprofen or Tylenol, though these do not completely alleviate the pain.    She denies any sudden incident or trauma preceding the onset of pain but mentions mowing the grass on the day the pain started. This current pain is worse than previous experiences with neck problems. She denies any radiating pain, numbness, or tingling into the arms.    Sleep has been affected by the pain, with her reporting difficulty sleeping.    She reports having itching in the hair and a specific itchy spot, for which she is currently taking doxycycline (100 mg) prescribed by a dermatologist for 7-10 days.    She denies any history of significant shoulder arthritis based on previous XRs.    PREVIOUS TREATMENTS:  Patient has undergone physical therapy in the past for neck problems.    IMAGING:  An X-ray of the right shoulder was performed two years ago. The report showed mild findings, with decent joint space and some arthritis present. The  radiologist's report indicated all findings were mild.    MEDICATIONS:  Patient is on Doxycycline 100 mg for 7-10 days for an itchy scalp. She is taking Tylenol and Ibuprofen for pain relief. These medications help with the current pain, but do not completely alleviate it.      ROS:  General: -fever, -chills, -fatigue, -weight gain, -weight loss  Eyes: -vision changes, -redness, -discharge  ENT: -ear pain, -nasal congestion, -sore throat  Cardiovascular: -chest pain, -palpitations, -lower extremity edema  Respiratory: -cough, -shortness of breath  Gastrointestinal: -abdominal pain, -nausea, -vomiting, -diarrhea, -constipation, -blood in stool  Genitourinary: -dysuria, -hematuria, -frequency  Musculoskeletal: +joint pain, -muscle pain, +neck pain, +burning sensation, +pain with movement, +body aches  Skin: -rash, -lesion, +itching  Neurological: -headache, -dizziness, -numbness, -tingling  Psychiatric: -anxiety, -depression, -sleep difficulty         Initial Pain Assessment:  Location: Rt shoulder and neck    Onset (Approx Date Pain started): 1 week  Current Pain Score: 7/10  Daily Pain of Range: 7-8/10  Quality: Aching, Burning, Grabbing, Tight, and Deep  Radiation: neck to the right shoulder   Worsened by: extension, lifting, and Rising arm   Improved by: heat and physical therapy     Patient denies night fever/night sweats, urinary incontinence, bowel incontinence, significant weight loss, significant motor weakness, and loss of sensations.      Previous Interventions:  - none     Previous Therapies:  PT/OT: no   Chiropractor:   HEP:   Relevant Surgery: no     Previous Medications:   - Tylenol or NSAIDS:   - Muscle Relaxants:    - TCAs:   - SNRIs:   - Topicals:   - Anticonvulsants:    - Opioids:   - Adjuvants:     Current Pain Medications:  None patient currently taking doxycycline for an acne related issue     Anticoagulation: .    History:  Current medications, allergies, medical history, surgical history,  "  family history, and social history were reviewed in the chart as marked.    Full Medication List:  Current Medications[1]     Allergies:  Iodinated contrast media     Medical History:   has a past medical history of Asymptomatic cholelithiasis, Hypothyroidism, Lichen planus, Scoliosis, and Unspecified essential hypertension (3/25/2014).    Surgical History:   has a past surgical history that includes Tubal ligation; Tonsillectomy; Colonoscopy (); Nose surgery; Colonoscopy (N/A, 2018); Colonoscopy (N/A, 10/12/2023); and Eye surgery (2022).    Family History:  family history includes Hypertension in her sister, sister, and son; No Known Problems in her daughter and son. She was adopted.    Social History:   reports that she quit smoking about 20 years ago. Her smoking use included cigarettes. She has a 3.8 pack-year smoking history. She has never used smokeless tobacco. She reports current alcohol use of about 5.0 standard drinks of alcohol per week. She reports that she does not use drugs.    Physical Exam:  Vitals:    07/15/25 1501   BP: 139/85   Pulse: 66   Weight: 81 kg (178 lb 7.4 oz)   Height: 5' 9" (1.753 m)   PainSc:   7   PainLoc: Shoulder           General Musculoskeletal Exam   Gait: normal     Back (L-Spine & T-Spine) / Neck (C-Spine) Exam     Tenderness Posterior midline palpation reveals tenderness of the Upper C-Spine and Lower C-Spine. Right paramedian tenderness of the Upper C-Spine and Lower C-Spine.     Neck (C-Spine) Range of Motion   Flexion:      Normal  Extension:  Normal  Right Lateral Bend: abnormal  Right Rotation: abnormal    Comments:  Tender to palpate right cervical paraspinals  Tender to palpate rhomboids 's in majors right      Muscle Strength   Right Upper Extremity   Biceps: 5/5   Wrist extension: 5/5   Wrist flexion: 5/5   Left Upper Extremity  Biceps: 5/5   Wrist extension: 5/5   Wrist flexion: 5/5       Imagin2023  XR SHOULDER TRAUMA 3 VIEW RIGHT   "   CLINICAL HISTORY:  Pain in right shoulder     FINDINGS:  Shoulder trauma three views right: There is mild DJD.  No fracture dislocation bone destruction seen.    Labs:  BMP  Lab Results   Component Value Date     01/24/2025    K 4.0 01/24/2025     01/24/2025    CO2 26 01/24/2025    BUN 20 01/24/2025    CREATININE 0.8 01/24/2025    CALCIUM 9.5 01/24/2025    ANIONGAP 10 01/24/2025    EGFRNORACEVR >60.0 01/24/2025     Lab Results   Component Value Date    ALT 14 01/24/2025    AST 18 01/24/2025    ALKPHOS 72 01/24/2025    BILITOT 0.4 01/24/2025     Lab Results   Component Value Date    WBC 5.31 01/24/2025    HGB 12.8 01/24/2025    HCT 41.0 01/24/2025    MCV 99 (H) 01/24/2025     01/24/2025           Assessment:  Problem List Items Addressed This Visit    None  Visit Diagnoses         Acute neck sprain, initial encounter    -  Primary    Relevant Orders    X-Ray Cervical Spine AP And Lateral      Myofascial pain on right side        Relevant Orders    X-Ray Cervical Spine AP And Lateral      Chronic right shoulder pain        Relevant Orders    X-ray Shoulder 2 or More Views Right              07/15/2025 - Radha Fraga is a 69 y.o. female who  has a past medical history of Asymptomatic cholelithiasis, Hypothyroidism, Lichen planus, Scoliosis, and Unspecified essential hypertension (3/25/2014).  By history and examination this patient has acute/subacute right cervical paraspinal right shoulder and right anterior chest pain  without radiculopathy.  The underlying cause is muscle dysfunction, muscles strain, and deconditioning.  Pathology is confirmed by imaging.  We discussed the underlying diagnoses and multiple treatment options including non-opioid medications, interventional procedures, physical therapy, home exercise, core muscle enhancement, and activity modification.  The risks and benefits of each treatment option were discussed and all questions were answered.      Treatment Plan:    Procedures:  None at this time.  In the future consider right cervical paraspinal trigger point injections  PT/OT/HEP: I have stressed the importance of physical activity and a home exercise plan to help with pain and improve health.  Medications:  Start Celebrex 200 mg b.i.d. p.r.n. for pain short currently                          Start Flexeril 5 mg q.h.s. p.r.n.   -  Not applicable  Imaging:  Order cervical AP and lateral x-rays additionally ordered right shoulder x-ray to rule out pathology  Follow Up: RTC 2-3 weeks after images to discuss for ongoing care and management of these conditions    NISSA Sheffield  Interventional Pain Management      Disclaimer: This note was partly generated using dictation software which may occasionally result in transcription errors.    07/15/2025         [1]   Current Outpatient Medications:     atorvastatin (LIPITOR) 10 MG tablet, Take 1 tablet (10 mg total) by mouth once daily., Disp: 90 tablet, Rfl: 3    clobetasoL (TEMOVATE) 0.05 % external solution, Apply topically once daily. Use to affected areas for up to 2 weeks then take a 1 week break or decrease to 3 times weekly. Do not apply to groin or face. Use to scalp, Disp: 50 mL, Rfl: 2    ipratropium (ATROVENT) 21 mcg (0.03 %) nasal spray, 2 sprays by Each Nostril route 3 (three) times daily., Disp: 30 mL, Rfl: 0    ketoconazole (NIZORAL) 2 % shampoo, Apply topically 3 (three) times a week. Use to wash scalp. Leave on for 5-10 minutes then wash off, Disp: 120 mL, Rfl: 10    levothyroxine (SYNTHROID) 75 MCG tablet, Take 1 tablet (75 mcg total) by mouth before breakfast., Disp: 90 tablet, Rfl: 3    losartan (COZAAR) 50 MG tablet, Take 1 tablet (50 mg total) by mouth once daily., Disp: 90 tablet, Rfl: 3    silver sulfADIAZINE 1% (SILVADENE) 1 % cream, Apply topically once daily., Disp: 50 g, Rfl: 0    triamcinolone acetonide 0.025% (KENALOG) 0.025 % cream, Apply topically 2 (two) times daily. Use to affected areas for up  to 2 weeks then take a 1 week break or decrease to 3 times weekly. Do not apply to groin or face. Use for rash on upper back and armpits as needed. Do not use for prolonged periods in armpits as it can cause stretch marks and skin thinning, Disp: 80 g, Rfl: 2

## 2025-07-18 ENCOUNTER — OFFICE VISIT (OUTPATIENT)
Dept: PAIN MEDICINE | Facility: CLINIC | Age: 70
End: 2025-07-18
Payer: MEDICARE

## 2025-07-18 DIAGNOSIS — M47.812 CHRONIC NECK PAIN WITH OSTEOARTHRITIS DETERMINED BY X-RAY: ICD-10-CM

## 2025-07-18 DIAGNOSIS — M47.819 SPONDYLOSIS WITHOUT MYELOPATHY: ICD-10-CM

## 2025-07-18 DIAGNOSIS — M19.011 PRIMARY OSTEOARTHRITIS OF RIGHT SHOULDER: Primary | ICD-10-CM

## 2025-07-18 DIAGNOSIS — M47.819 ARTHROPATHY OF FACET JOINTS AT MULTIPLE LEVELS: ICD-10-CM

## 2025-07-18 DIAGNOSIS — M79.18 MYOFASCIAL PAIN ON RIGHT SIDE: ICD-10-CM

## 2025-07-18 DIAGNOSIS — G89.29 CHRONIC NECK PAIN WITH OSTEOARTHRITIS DETERMINED BY X-RAY: ICD-10-CM

## 2025-07-18 NOTE — PROGRESS NOTES
[facility] - New Patient Evaluation    Referred by: Dr. Chicho Meléndez   Reason for referral: * No diagnoses found *     CC:   No chief complaint on file.        7/15/2025     3:00 PM   Last 3 PDI Scores   Pain Disability Index (PDI) 19       Interval history 07/18/2025:  69 year female that presents virtually for a follow-up appointment she has a history of chronic neck pain with right sided shoulder pain.  She reports relief with recently starting Celebrex and Flexeril at night she reports that her range of motion in her right shoulder is much better she reports constant neck pain however it is tolerable at this time and the anti-inflammatory that was prescribed seems to have helped today she denies any new pain denies any profound weakness denies any radiating symptoms down either arm denies any paresthesia like symptoms.      Subjective 07/15/2025:   Radha Fraga is a 69 y.o. female who presents complaining of right side shoulder and neck pain that began 5 days ago      CHIEF COMPLAINT:  Right shoulder and neck pain    HPI:  Patient presents with right shoulder and neck pain that started approximately 5 days ago. Pain radiates from the right shoulder into the neck bilaterally, described as burning, particularly in the back of the neck and shoulder area. She reports the pain as intense and burning. Pain is intermittent, always achy, and feels like a bruise in the front of the neck. Raising the arm aggravates the pain, especially in the shoulder area. Mitigating factors include staying still and taking ibuprofen or Tylenol, though these do not completely alleviate the pain.    She denies any sudden incident or trauma preceding the onset of pain but mentions mowing the grass on the day the pain started. This current pain is worse than previous experiences with neck problems. She denies any radiating pain, numbness, or tingling into the arms.    Sleep has been affected by the pain, with  her reporting difficulty sleeping.    She reports having itching in the hair and a specific itchy spot, for which she is currently taking doxycycline (100 mg) prescribed by a dermatologist for 7-10 days.    She denies any history of significant shoulder arthritis based on previous XRs.    PREVIOUS TREATMENTS:  Patient has undergone physical therapy in the past for neck problems.    IMAGING:    An X-ray of the right shoulder was performed two years ago. The report showed mild findings, with decent joint space and some arthritis present. The radiologist's report indicated all findings were mild.  EXAMINATION:-2025  XR SHOULDER COMPLETE 2 OR MORE VIEWS RIGHT     CLINICAL HISTORY:  Pain in right shoulder     TECHNIQUE:  Two or three views of the right shoulder were performed.     COMPARISON:  No 06/15/2023 ne     FINDINGS:  Mild DJD.  No fracture or dislocation.     EXAMINATION:  XR CERVICAL SPINE AP LATERAL -2025     CLINICAL HISTORY:  Myalgia, other site     TECHNIQUE:  AP, lateral and open mouth views of the cervical spine were performed.     COMPARISON:  None.     FINDINGS:  DJD with significant narrowing of disc spaces between C4 and C7 vertebral segments.  No acute fracture or dislocation.  No bone destruction identified.  There is a 2 mm C3/C4 anterolisthesis and a 2 mm C4/C5 retrolisthesis identified.    MEDICATIONS:  Patient is on Doxycycline 100 mg for 7-10 days for an itchy scalp. She is taking Tylenol and Ibuprofen for pain relief. These medications help with the current pain, but do not completely alleviate it.      ROS:  General: -fever, -chills, -fatigue, -weight gain, -weight loss  Eyes: -vision changes, -redness, -discharge  ENT: -ear pain, -nasal congestion, -sore throat  Cardiovascular: -chest pain, -palpitations, -lower extremity edema  Respiratory: -cough, -shortness of breath  Gastrointestinal: -abdominal pain, -nausea, -vomiting, -diarrhea, -constipation, -blood in stool  Genitourinary: -dysuria,  -hematuria, -frequency  Musculoskeletal: +joint pain, -muscle pain, +neck pain, +burning sensation, +pain with movement, +body aches  Skin: -rash, -lesion, +itching  Neurological: -headache, -dizziness, -numbness, -tingling  Psychiatric: -anxiety, -depression, -sleep difficulty         Initial Pain Assessment:  Location: Rt shoulder and neck    Onset (Approx Date Pain started): 1 week  Current Pain Score: 7/10  Daily Pain of Range: 7-8/10  Quality: Aching, Burning, Grabbing, Tight, and Deep  Radiation: neck to the right shoulder   Worsened by: extension, lifting, and Rising arm   Improved by: heat and physical therapy     Patient denies night fever/night sweats, urinary incontinence, bowel incontinence, significant weight loss, significant motor weakness, and loss of sensations.      Previous Interventions:  - none     Previous Therapies:  PT/OT: no   Chiropractor:   HEP:   Relevant Surgery: no     Previous Medications:   - Tylenol or NSAIDS:   - Muscle Relaxants:    - TCAs:   - SNRIs:   - Topicals:   - Anticonvulsants:    - Opioids:   - Adjuvants:     Current Pain Medications:  None patient currently taking doxycycline for an acne related issue     Anticoagulation: .    History:  Current medications, allergies, medical history, surgical history,   family history, and social history were reviewed in the chart as marked.    Full Medication List:  Current Medications[1]     Allergies:  Iodinated contrast media     Medical History:   has a past medical history of Asymptomatic cholelithiasis, Hypothyroidism, Lichen planus, Scoliosis, and Unspecified essential hypertension (3/25/2014).    Surgical History:   has a past surgical history that includes Tubal ligation; Tonsillectomy; Colonoscopy (2006); Nose surgery; Colonoscopy (N/A, 03/23/2018); Colonoscopy (N/A, 10/12/2023); and Eye surgery (1/21/2022).    Family History:  family history includes Hypertension in her sister, sister, and son; No Known Problems in her  daughter and son. She was adopted.    Social History:   reports that she quit smoking about 20 years ago. Her smoking use included cigarettes. She has a 3.8 pack-year smoking history. She has never used smokeless tobacco. She reports current alcohol use of about 5.0 standard drinks of alcohol per week. She reports that she does not use drugs.        PE virtual Visit   GEN: No acute distress. Calm, comfortable  HENT: Normocephalic, atraumatic, moist mucous membranes  EYE: Anicteric sclera, non-injected.   CV: Non-diaphoretic.   RESP: Breathing comfortably. Chest expansion symmetric.  PSYCH: Pleasant mood and appropriate affect. Recent and remote memory intact.           Physical Exam:  There were no vitals filed for this visit.          General Musculoskeletal Exam   Gait: normal     Back (L-Spine & T-Spine) / Neck (C-Spine) Exam     Tenderness Posterior midline palpation reveals tenderness of the Upper C-Spine and Lower C-Spine. Right paramedian tenderness of the Upper C-Spine and Lower C-Spine.     Neck (C-Spine) Range of Motion   Flexion:      Normal  Extension:  Normal  Right Lateral Bend: abnormal  Right Rotation: abnormal    Comments:  Tender to palpate right cervical paraspinals  Tender to palpate rhomboids 's in majors right      Muscle Strength   Right Upper Extremity   Biceps: 5/5   Wrist extension: 5/5   Wrist flexion: 5/5   Left Upper Extremity  Biceps: 5/5   Wrist extension: 5/5   Wrist flexion: 5/5       Imagin2023  XR SHOULDER TRAUMA 3 VIEW RIGHT     CLINICAL HISTORY:  Pain in right shoulder     FINDINGS:  Shoulder trauma three views right: There is mild DJD.  No fracture dislocation bone destruction seen.    Labs:  BMP  Lab Results   Component Value Date     2025    K 4.0 2025     2025    CO2 26 2025    BUN 20 2025    CREATININE 0.8 2025    CALCIUM 9.5 2025    ANIONGAP 10 2025    EGFRNORACEVR >60.0 2025     Lab Results    Component Value Date    ALT 14 01/24/2025    AST 18 01/24/2025    ALKPHOS 72 01/24/2025    BILITOT 0.4 01/24/2025     Lab Results   Component Value Date    WBC 5.31 01/24/2025    HGB 12.8 01/24/2025    HCT 41.0 01/24/2025    MCV 99 (H) 01/24/2025     01/24/2025           Assessment:  Problem List Items Addressed This Visit    None  Visit Diagnoses         Primary osteoarthritis of right shoulder    -  Primary    Relevant Orders    Ambulatory Referral/Consult to Physical Therapy      Chronic neck pain with osteoarthritis determined by x-ray        Relevant Orders    Ambulatory Referral/Consult to Physical Therapy      Myofascial pain on right side        Relevant Orders    Ambulatory Referral/Consult to Physical Therapy      Arthropathy of facet joints at multiple levels          Spondylosis without myelopathy                    07/15/2025:  Radha Fraga is a 69 y.o. female who  has a past medical history of Asymptomatic cholelithiasis, Hypothyroidism, Lichen planus, Scoliosis, and Unspecified essential hypertension (3/25/2014).  By history and examination this patient has acute/subacute right cervical paraspinal right shoulder and right anterior chest pain  without radiculopathy.  The underlying cause is muscle dysfunction, muscles strain, and deconditioning.  Pathology is confirmed by imaging.  We discussed the underlying diagnoses and multiple treatment options including non-opioid medications, interventional procedures, physical therapy, home exercise, core muscle enhancement, and activity modification.  The risks and benefits of each treatment option were discussed and all questions were answered.      7/[id number]-old female with a history of chronic neck pain and right shoulder pain based on x-rays patient has degenerative disc disease from C4-C7 in her cervical spine she also has degenerative joint disease in her right shoulder but mild at this time.  There is no radicular symptoms noted with  her pain, no paresthesia like symptoms.  Pathology confirmed upon review of images patient and I discussed the underlying diagnosis and multiple treatment options including nonopioid medications, interventional procedures, physical therapy, home exercise, core muscle and has been and activity modification.  The risks and benefits of each treatment option were discussed all questions were answered.    Treatment Plan:   Procedures:  None at this time.  Cervical MBB x2 targeting C3, C4 on C5 bilateral and RFA if appropriate  PT/OT/HEP: new Consult to PT for Neck and Right shoulder pain. I have stressed the importance of physical activity and a home exercise plan to help with pain and improve health.  Medications:  Continue Celebrex 200 mg b.i.d. p.r.n. for pain short currently                          Continue Flexeril 5 mg q.h.s. p.r.n.   -  Not applicable  Imaging:  Previous imaging reviewed and discussed in detail sharing the images from patient's chart   Follow Up:  3-4 months or sooner for ongoing care and management of these conditions    NISSA Sheffield  [facility]      Disclaimer: This note was partly generated using dictation software which may occasionally result in transcription errors.    07/15/2025           [1]   Current Outpatient Medications:     atorvastatin (LIPITOR) 10 MG tablet, Take 1 tablet (10 mg total) by mouth once daily., Disp: 90 tablet, Rfl: 3    celecoxib (CELEBREX) 200 MG capsule, Take 1 capsule (200 mg total) by mouth 2 (two) times daily. for 14 days, Disp: 28 capsule, Rfl: 0    clobetasoL (TEMOVATE) 0.05 % external solution, Apply topically once daily. Use to affected areas for up to 2 weeks then take a 1 week break or decrease to 3 times weekly. Do not apply to groin or face. Use to scalp, Disp: 50 mL, Rfl: 2    cyclobenzaprine (FLEXERIL) 5 MG tablet, Take 1 tablet (5 mg total) by mouth nightly., Disp: 30 tablet, Rfl: 0    ipratropium (ATROVENT) 21 mcg (0.03 %)  nasal spray, 2 sprays by Each Nostril route 3 (three) times daily., Disp: 30 mL, Rfl: 0    ketoconazole (NIZORAL) 2 % shampoo, Apply topically 3 (three) times a week. Use to wash scalp. Leave on for 5-10 minutes then wash off, Disp: 120 mL, Rfl: 10    levothyroxine (SYNTHROID) 75 MCG tablet, Take 1 tablet (75 mcg total) by mouth before breakfast., Disp: 90 tablet, Rfl: 3    losartan (COZAAR) 50 MG tablet, Take 1 tablet (50 mg total) by mouth once daily., Disp: 90 tablet, Rfl: 3    silver sulfADIAZINE 1% (SILVADENE) 1 % cream, Apply topically once daily., Disp: 50 g, Rfl: 0    triamcinolone acetonide 0.025% (KENALOG) 0.025 % cream, Apply topically 2 (two) times daily. Use to affected areas for up to 2 weeks then take a 1 week break or decrease to 3 times weekly. Do not apply to groin or face. Use for rash on upper back and armpits as needed. Do not use for prolonged periods in armpits as it can cause stretch marks and skin thinning, Disp: 80 g, Rfl: 2

## 2025-08-08 ENCOUNTER — PATIENT MESSAGE (OUTPATIENT)
Dept: ADMINISTRATIVE | Facility: OTHER | Age: 70
End: 2025-08-08
Payer: MEDICARE

## 2025-08-11 RX ORDER — CYCLOBENZAPRINE HCL 5 MG
5 TABLET ORAL NIGHTLY
Qty: 30 TABLET | Refills: 0 | Status: SHIPPED | OUTPATIENT
Start: 2025-08-11

## 2025-09-05 ENCOUNTER — CLINICAL SUPPORT (OUTPATIENT)
Dept: REHABILITATION | Facility: HOSPITAL | Age: 70
End: 2025-09-05
Payer: MEDICARE

## 2025-09-05 DIAGNOSIS — R68.89 DECREASED FUNCTIONAL ACTIVITY TOLERANCE: ICD-10-CM

## 2025-09-05 DIAGNOSIS — R53.1 WEAKNESS: Primary | ICD-10-CM

## 2025-09-05 PROCEDURE — 97140 MANUAL THERAPY 1/> REGIONS: CPT | Mod: PN

## 2025-09-05 PROCEDURE — 97161 PT EVAL LOW COMPLEX 20 MIN: CPT | Mod: PN

## 2025-09-05 PROCEDURE — 97110 THERAPEUTIC EXERCISES: CPT | Mod: PN
